# Patient Record
Sex: MALE | Race: WHITE | Employment: OTHER | URBAN - METROPOLITAN AREA
[De-identification: names, ages, dates, MRNs, and addresses within clinical notes are randomized per-mention and may not be internally consistent; named-entity substitution may affect disease eponyms.]

---

## 2017-01-04 ENCOUNTER — NURSING HOME DICTATION (OUTPATIENT)
Dept: CARDIOLOGY | Facility: CLINIC | Age: 82
End: 2017-01-04

## 2017-01-09 NOTE — PROGRESS NOTES
SUBJECTIVE:  Staff reports patient was transferred from a different end of the facility because of a possible situation with inappropriate interaction with another patient.  He seems to be fitting in better here now.  Staff reports no new issues.      PHYSICAL EXAMINATION:   VITAL SIGNS:  See vitals.   CARDIAC:  Regular.   LUNGS:  Clear.      ASSESSMENT:  Stable.        PLAN:  Pharmacy requests a dose reduction on omeprazole, which I feel is appropriate.  Otherwise, he has hypertension, dementia with behavioral disturbance, and history of pacemaker, all stable.  Chart and meds reviewed.  Continue with present plans except the change in the omeprazole.         IRDGE QUINN             D: 2017 09:26   T: 2017 09:51   MT: HARIKA      Name:     ESA LEON   MRN:      [id number]        Account:      [id number]   :      1930           Visit Date:   2017      Document: [id number]       cc: [facility]

## 2017-01-13 DIAGNOSIS — S32.020S COMPRESSION FRACTURE OF L2, SEQUELA: Primary | ICD-10-CM

## 2017-01-13 RX ORDER — HYDROCODONE BITARTRATE AND ACETAMINOPHEN 5; 325 MG/1; MG/1
1 TABLET ORAL EVERY 6 HOURS PRN
Qty: 30 TABLET | Refills: 0 | Status: SHIPPED | OUTPATIENT
Start: 2017-01-13 | End: 2017-02-02

## 2017-01-13 NOTE — TELEPHONE ENCOUNTER
norco      Last Written Prescription Date: 12/29/16  Last Fill Quantity: 30,  # refills: 0   Last Office Visit with [facility], [facility] or [facility] prescribing provider: 1/4/17                                         Next 5 appointments (look out 90 days)     Mar 14, 2017 10:30 AM   (Arrive by 10:15 AM)   Return Visit with  PACEMAKER   [facility] ([facility])    [facility] [place] [address]   [phone]

## 2017-01-27 ENCOUNTER — OFFICE VISIT (OUTPATIENT)
Dept: PSYCHIATRY | Facility: OTHER | Age: 82
End: 2017-01-27
Attending: PSYCHIATRY & NEUROLOGY
Payer: COMMERCIAL

## 2017-01-27 VITALS — HEART RATE: 67 BPM | TEMPERATURE: 97.2 F | SYSTOLIC BLOOD PRESSURE: 106 MMHG | DIASTOLIC BLOOD PRESSURE: 54 MMHG

## 2017-01-27 DIAGNOSIS — F03.91 MAJOR NEUROCOGNITIVE DISORDER DUE TO ALZHEIMER'S DISEASE, PROBABLE, WITH BEHAVIORAL DISTURBANCE: Primary | ICD-10-CM

## 2017-01-27 PROCEDURE — 99204 OFFICE O/P NEW MOD 45 MIN: CPT | Performed by: PSYCHIATRY & NEUROLOGY

## 2017-01-27 PROCEDURE — 99213 OFFICE O/P EST LOW 20 MIN: CPT

## 2017-01-27 ASSESSMENT — ANXIETY QUESTIONNAIRES
7. FEELING AFRAID AS IF SOMETHING AWFUL MIGHT HAPPEN: NOT AT ALL
3. WORRYING TOO MUCH ABOUT DIFFERENT THINGS: NOT AT ALL
6. BECOMING EASILY ANNOYED OR IRRITABLE: NOT AT ALL
2. NOT BEING ABLE TO STOP OR CONTROL WORRYING: NOT AT ALL
5. BEING SO RESTLESS THAT IT IS HARD TO SIT STILL: NOT AT ALL
GAD7 TOTAL SCORE: 0
1. FEELING NERVOUS, ANXIOUS, OR ON EDGE: NOT AT ALL

## 2017-01-27 ASSESSMENT — PATIENT HEALTH QUESTIONNAIRE - PHQ9: 5. POOR APPETITE OR OVEREATING: NOT AT ALL

## 2017-01-27 ASSESSMENT — PAIN SCALES - GENERAL: PAINLEVEL: SEVERE PAIN (6)

## 2017-01-27 NOTE — PROGRESS NOTES
"  OUTPATIENT PSYCHIATRY DIAGNOSTIC ASSESSMENT     IDENTIFICATION:  Toi Cowart is a 86 year old male   The patient was a poor historian.     CHIEF COMPLAINT   agitation    HISTORY OF PRESENT ILLNESS     Toi Cowart is an 85 yo with dementia who presents by himself today to clinic. He resides at [facility]. Paperwork from [facility] sent with Toi notes \"residents behaviors / wandering / inappropriateness, during conference with wife and daughter. Concern was brought up with resident and family is wondering if a consult is needed to review meds with a psych MD.\" Toi was unable to provide much for history at today's visit thus information was obtained from paperwork sent with him and chart review. Looks like consult was placed this fall '16. Last visit from his primary within ~ past month sounded like Toi has been doing a little better in terms of agitation / disruptive behavior. Looks like Toi is taking Depakote sprinkles 125 mg 3 times per day. Risperdal 0.5 mg every 4 hours prn severe agitation is listed but looks like may have been from when he was inpatient for medical issues.     Venancio today notes he is uncertain why he was sent here. He was able to tell me [place], has a wife, and two children. He worked as an  and was it [employer] for two years. He notes he is content with his current residence however \"there are a couple of guys though who I don't always get along with\". I asked some basic questions to help figure out if he was oriented. Toi was able to tell me the season - winter - and the town and state we are in. He was unable to come up with the year. I asked who our new president is and he noted \"Andre\". I asked him to follow a 3 - step - command (grab paper in his right hand, fold it in half, place it on his lap) and he grabbed the paper then shook his head and noted \"now I don't remember the rest of it. What was I supposed to do?\". I asked him to draw " "a clock and place the hands at \"ten to eleven\". He maegan a [place] and began to begin drawing in the numbers but was unable to figure out what numbers and where to place them. He notes he sleeps okay. He denies having any issues with pain, denies any stomach issues such as nausea, vomiting, abdominal pain. Denies any issues with bowel or bladder.       CHEMICAL DEPENDENCY      Unable to get history as to whether any hx of chem dep      MEDICAL/SURGICAL HISTORY            Medical Team:    PMD- Dr. Julio Bell     Therapist- none    Patient Active Problem List   Diagnosis     Sinoatrial node dysfunction (H)     Cardiac pacemaker, Medtronic, Dual chamber     GERD (gastroesophageal reflux disease)     Fatigue     Essential hypertension     Sleep disorder     CAD (coronary artery disease)     Dementia with behavioral disturbance     Hypercholesteremia     Osteoarthritis     Bilateral inguinal hernia     ACP (advance care planning)     Aspiration pneumonia (H)     MEDICAL ROS   A comprehensive 12 point review of systems was performed and found to be negative. In particular Toi denies any issues with pain, stomach pain, nausea or vomiting, headaches.     ALLERGY   Contrast dye; Fluvastatin sodium; Lovastatin; and No clinical screening - see comments    MEDICATIONS                                                                     bold psych meds     Current Outpatient Prescriptions   Medication Sig     HYDROcodone-acetaminophen (NORCO) 5-325 MG per tablet Take 1 tablet by mouth every 6 hours as needed for moderate to severe pain     divalproex (DEPAKOTE SPRINKLE) 125 MG CR capsule TAKE (1) CAPSULE THREE TIMES DAILY.     traZODone (DESYREL) 50 MG tablet TAKE ONE TABLET AT BEDTIME.     sertraline (ZOLOFT) 25 MG tablet TAKE 1 TABLET DAILY.     NUEDEXTA 20-10 MG capsule TAKE 1 CAPSULE DAILY.     omeprazole (PRILOSEC) 20 MG capsule TAKE  (1)  CAPSULE  TWICE DAILY.     amoxicillin-clavulanate (AUGMENTIN) 875-125 MG per tablet " Take 1 tablet by mouth every 12 hours     Rectal Barrier Apply to affected areas as needed daily     loperamide (IMODIUM) 2 MG capsule TAKE 1 CAPSULE FOUR TIMES DAILY FOR DIARRHEA AS NEEDED     nystatin (MYCOSTATIN) 725229 UNIT/GM POWD Apply topically 2 times daily Apply to groin area twice daily.     CALMOSEPTINE 0.44-20.6 % OINT USE AS NEEDED.     GLUCOSAMINE RELIEF 500 MG CAPS TAKE  (1)  CAPSULE  TWICE DAILY.     SB IBUPROFEN 200 MG tablet TAKE 1 TABLET EVERY 4 HOURS AS NEEDED FOR PAIN OR FEVER     memantine XR (NAMENDA XR) 28 MG capsule Take 10 mg by mouth 2 times daily      RisperiDONE (RISPERDAL PO) Take 0.5 mg by mouth every 4 hours as needed      calcium carbonate (TUMS) 500 MG chewable tablet Take 1 chew tab by mouth every 6 hours as needed for heartburn      Pantoprazole Sodium (PROTONIX PO) Take 40 mg by mouth 2 times daily (before meals)      DONEPEZIL HCL PO Take 10 mg by mouth At Bedtime.     No current facility-administered medications for this visit.       VITALS   /54 mmHg  Pulse 67  Temp(Src) 97.2  F (36.2  C) (Tympanic)     PHQ9                             [unfilled]  LABS                                                                                  PSYCHLAB1;  PSYCHLAB2       Last Basic Metabolic Panel:  NA      143   7/26/2016   POTASSIUM      4.7   7/26/2016  CHLORIDE      112   7/26/2016  JOCELIN      8.2   7/26/2016  CO2       20   7/26/2016  BUN       17   7/26/2016  CR     0.69   7/26/2016  GLC       92   7/26/2016    Liver Function Studies -   Recent Labs   Lab Test  07/26/16   0537   PROTTOTAL  6.4*   ALBUMIN  2.5*   BILITOTAL  0.6   ALKPHOS  293*   AST  23   ALT  26      FAMILY HISTORY                                                                           patient reported     Family history is significant for: reports no hx dementia in familyt hat he knows of      SOCIAL HISTORY                                                                            patient reported    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 Naila and Toi  Legal- none  Trauma history (self-report)-  No hx of abuse  Social/Spiritual Support- reports [facility]  MENTAL STATUS EXAM                                                                            Alertness:  alert oriented to person, place, not to date. Later in visit he appeared groggy and looked to be falling asleep.   Appearance:  casually groomed elderly male hunched over in wheelchair.  Behavior/Demeanor:  cooperative and agitated, made comments back mockingly and joked about hitting me though didn't ever act on this with fair  eye contact.  Speech:  normal and regular rate and rhythm  Psychomotor:  normal or unremarkable    Mood: labile  Affect:  labile and was congruent to speech content.  Thought Process/Associations:  unremarkable   Thought Content:  Thought content was unremarkable for suicidal ideation and violent ideation.    Perception:  did not oberve him to be responding to internal stimuli  Insight:  poor.  Judgment: limited.  Attention/Concentration:  poor  Language:  intact and no problems  Fund of Knowledge:  intact   Memory:  Impaired - short term and long-term    These cognitive functions grossly appear as described, but were not formally tested.    ASSESSMENT                                                                                             Toi Cowart is an 85 yo with CAD s/p pacemaker, dementia, HTN, hyperlipidemia who I asked to see for agitation. He is taking Depakote sprinkles 125 mg 3 times daily. Per chart notes looks like he may be ding little better as compared to several months ago. Suggestions if agitation worsens: would check Depakote level , continue to follow LFTs and if level below therapeutic range or lower end could increase Depakote. Could try small dose of hydroxyzine as  a prn for agitation and I'd start as low as can at 1/2 of a 25 mg tab -> could increase as long as it doesn't sedate him. Could think of adding Buspar and would start out low at 5 mg tid. Risperdal 0.5 mg listed but I can't tell if this is current if this is from when he had a hospital stay for medical issues. Neuroleptics do carry the blackbox warning for elderly with dementia however with Mr. Cowart being 87 yo this comes down to quality of life: if agitation is causing possible to harm to others or is very upsetting to Mr. Cowart then in that case one needs to consider that maybe the benefits outweigh the risks of using a neuroleptic - I would stick with the Risperdal and use lowest dose as possible yet effective (0.25 to 0.5 mg per dose). Would discuss these things with his family and explain medications as benefits vs. Risks. Mr. Cowart has hx of falls thus need to be cautious with meds inclduing those I've spoken of: Depakote, Risperdal, hydroxyzine can be sedating.. I don't typically see Buspar to be sedating.       TREATMENT RISK STATEMENT:  The risks, benefits, alternatives and potential adverse effects have been explained and are understood by the pt.  The pt agrees to the treatment plan with the ability to do so.   The pt knows to call the clinic for any problems or access emergency care if needed.        DIAGNOSES                 (Use of Axes system will continue, even though absent from DSM 5)         Axis I   - Major neurocognitive disorder  Axis II  - no dx  Axis III - CAD s/p pacemaker, GERD, HTN  Axis IV-  Psychosocial Stressors include: multiple medical issues, nursing home setting  Axis V - Global Assessment of Functioning current: 45    PLAN                                                                                                                    1)  MEDICATIONS:         -- I did not make any medication changes today for Mr. Cowart, rather made some suggestions as listed above.     2)   THERAPY:  No Change    3)  LABS:  Suggest checking a Depakote level and following LFTs    4)  PT MONITOR [call for probs]:  Worsening symptoms, SI/HI, SEs from meds    5)  REFERRALS [CD, medical, other]:  None    6)  RTC:    No follow up set

## 2017-01-27 NOTE — NURSING NOTE
"Chief Complaint   Patient presents with     Consult     Mental health.  Referral from Dr. KAITY Bell. Medication check / review.       Initial /54 mmHg  Pulse 67  Temp(Src) 97.2  F (36.2  C) (Tympanic) Estimated body mass index is 26.06 kg/(m^2) as calculated from the following:    Height as of 10/30/15: 5' 7\" (1.702 m).    Weight as of 7/13/16: 166 lb 7.2 oz (75.5 kg).  BP completed using cuff size: carolee DIAZ      "

## 2017-01-27 NOTE — MR AVS SNAPSHOT
After Visit Summary   1/27/2017    Toi Cowart    MRN: [id number]           Patient Information     Date Of Birth          8/19/1930        Visit Information        Provider Department      1/27/2017 9:00 AM Lisbet Salas MD [facility]        Today's Diagnoses     Major neurocognitive disorder due to Alzheimer's disease, probable, with behavioral disturbance    -  1        Follow-ups after your visit        Your next 10 appointments already scheduled     Jan 31, 2017  2:30 PM   TTM Pacemaker Remote with  ICD [facility] ([facility])    [address]  3rd Floor  [facility] [id number] [phone]           Note: this is not an onsite visit; there is no need to come to the facility.            Mar 14, 2017 10:30 AM   (Arrive by 10:15 AM)   Return Visit with  PACEMAKER   [facility] [place] ([facility])    [address] [place] [address] [facility] [address] [phone]              Who to contact     If you have questions or need follow up information about today's clinic visit or your schedule please contact [facility] directly at [phone].  Normal or non-critical lab and imaging results will be communicated to you by MyChart, letter or phone within 4 business days after the clinic has received the results. If you do not hear from us within 7 days, please contact the clinic through [employer] or phone. If you have a critical or abnormal lab result, we will notify you by phone as soon as possible.  Submit refill requests through pbsi or call your pharmacy and they will forward the refill request to us. Please allow 3 business days for your refill to be completed.          Additional Information About Your Visit        MyChart Information     pbsi lets you send messages to your doctor, view your test results, renew your prescriptions, schedule appointments and more. To sign up, go to  [email or web address] . Click on \"Log in\" on the left side of the screen, which will take you to the Welcome page. Then click on \"Sign up Now\" on the right side of the page.     You will be asked to enter the access code listed below, as well as some personal information. Please follow the directions to create your username and password.     Your access code is: [email or web address]  Expires: 2017  6:37 PM     Your access code will  in 90 days. If you need help or a new code, please call your [facility] clinic or [phone].        Care EveryWhere ID     This is your Care EveryWhere ID. This could be used by other organizations to access your [facility] medical records  [id number]        Your Vitals Were     Pulse Temperature                67 97.2  F (36.2  C) (Tympanic)           Blood Pressure from Last 3 Encounters:   17 106/54   16 134/65   16 107/73    Weight from Last 3 Encounters:   16 166 lb 7.2 oz (75.5 kg)   14 175 lb (79.379 kg)   05/15/14 200 lb (90.719 kg)              Today, you had the following     No orders found for display       Primary Care Provider Office Phone # Fax #    R Julio Bell [phone]       [facility] [address] [place] [address]        Thank you!     Thank you for choosing [facility]  for your care. Our goal is always to provide you with excellent care. Hearing back from our patients is one way we can continue to improve our services. Please take a few minutes to complete the written survey that you may receive in the mail after your visit with us. Thank you!             Your Updated Medication List - Protect others around you: Learn how to safely use, store and throw away your medicines at www.disposemymeds.org.          This list is accurate as of: 17  6:37 PM.  Always use your most recent med list.                   Brand Name Dispense Instructions for use    calcium carbonate 500 MG " chewable tablet    TUMS     Take 1 chew tab by mouth every 6 hours as needed for heartburn       CALMOSEPTINE 0.44-20.6 % Oint   Generic drug:  Menthol-Zinc Oxide     113 g    USE AS NEEDED.       divalproex 125 MG CR capsule    DEPAKOTE SPRINKLE    84 capsule    TAKE (1) CAPSULE THREE TIMES DAILY.       DONEPEZIL HCL PO      Take 10 mg by mouth At Bedtime.       GLUCOSAMINE RELIEF 500 MG Caps capsule   Generic drug:  glucosamine     60 capsule    TAKE  (1)  CAPSULE  TWICE DAILY.       HYDROcodone-acetaminophen 5-325 MG per tablet    NORCO    30 tablet    Take 1 tablet by mouth every 6 hours as needed for moderate to severe pain       loperamide 2 MG capsule    IMODIUM    120 capsule    TAKE 1 CAPSULE FOUR TIMES DAILY FOR DIARRHEA AS NEEDED       NAMENDA XR 28 MG 24 hr capsule   Generic drug:  memantine XR      Take 10 mg by mouth 2 times daily       NUEDEXTA 20-10 MG per capsule   Generic drug:  dextromethorphan-quiNIDine     8 capsule    TAKE 1 CAPSULE DAILY.       nystatin 799083 UNIT/GM Powd    MYCOSTATIN    60 g    Apply topically 2 times daily Apply to groin area twice daily.       omeprazole 20 MG CR capsule    priLOSEC    60 capsule    TAKE  (1)  CAPSULE  TWICE DAILY.       PROTONIX PO      Take 40 mg by mouth 2 times daily (before meals)       Rectal Barrier     90 g    Apply to affected areas as needed daily       RISPERDAL PO      Take 0.5 mg by mouth every 4 hours as needed       SB IBUPROFEN 200 MG tablet   Generic drug:  ibuprofen     100 tablet    TAKE 1 TABLET EVERY 4 HOURS AS NEEDED FOR PAIN OR FEVER       sertraline 25 MG tablet    ZOLOFT    28 tablet    TAKE 1 TABLET DAILY.       traZODone 50 MG tablet    DESYREL    28 tablet    TAKE ONE TABLET AT BEDTIME.

## 2017-01-28 ASSESSMENT — PATIENT HEALTH QUESTIONNAIRE - PHQ9: SUM OF ALL RESPONSES TO PHQ QUESTIONS 1-9: 0

## 2017-01-28 ASSESSMENT — ANXIETY QUESTIONNAIRES: GAD7 TOTAL SCORE: 0

## 2017-02-01 ENCOUNTER — NURSING HOME DICTATION (OUTPATIENT)
Dept: FAMILY MEDICINE | Facility: OTHER | Age: 82
End: 2017-02-01

## 2017-02-01 ENCOUNTER — OFFICE VISIT (OUTPATIENT)
Dept: CARDIOLOGY | Facility: CLINIC | Age: 82
End: 2017-02-01
Attending: INTERNAL MEDICINE
Payer: COMMERCIAL

## 2017-02-01 DIAGNOSIS — I49.5 SINOATRIAL NODE DYSFUNCTION (H): Primary | ICD-10-CM

## 2017-02-01 PROCEDURE — 93293 PM PHONE R-STRIP DEVICE EVAL: CPT | Performed by: INTERNAL MEDICINE

## 2017-02-01 PROCEDURE — 93293 PM PHONE R-STRIP DEVICE EVAL: CPT | Mod: ZF

## 2017-02-01 NOTE — PROGRESS NOTES
Scheduled transtelephonic pacemaker check done per MD orders.  Normal pacemaker function.  Presenting EGM = AS- @ 60 bpm.  30 second presenting, magnet and post-magnet EGM's saved.  Magnet response within normal limits.  Magnet rate = 85 bpm.  Normal pacemaker function.  Nurse caring for patient reports that he is feeling well and denies specific complaints.  Plan for next RTC pacemaker check on 3/14/2017 @ 10:30 am.    Transtelephonic pacemaker check

## 2017-02-01 NOTE — Clinical Note
2/1/2017      RE: Toi Cowart  [address]   [facility] [address]       Dear Colleague,    Thank you for the opportunity to participate in the care of your patient, Toi Cowart, at the [facility]. Please see a copy of my visit note below.    Scheduled transtelephonic pacemaker check done per MD orders.  Normal pacemaker function.  Presenting EGM = AS- @ 60 bpm.  30 second presenting, magnet and post-magnet EGM's saved.  Magnet response within normal limits.  Magnet rate = 85 bpm.  Normal pacemaker function.  Nurse caring for patient reports that he is feeling well and denies specific complaints.  Plan for next RTC pacemaker check on 3/14/2017 @ 10:30 am.    Transtelephonic pacemaker check    Please do not hesitate to contact me if you have any questions/concerns.     Sincerely,     ICD Remote

## 2017-02-02 DIAGNOSIS — S32.020S COMPRESSION FRACTURE OF L2, SEQUELA: Primary | ICD-10-CM

## 2017-02-02 RX ORDER — HYDROCODONE BITARTRATE AND ACETAMINOPHEN 5; 325 MG/1; MG/1
1 TABLET ORAL EVERY 6 HOURS PRN
Qty: 30 TABLET | Refills: 0 | Status: SHIPPED | OUTPATIENT
Start: 2017-02-02 | End: 2017-02-15

## 2017-02-02 NOTE — TELEPHONE ENCOUNTER
norco      Last Written Prescription Date: 1/13/17  Last Fill Quantity: 30,  # refills: 0   Last Office Visit with [facility], [facility] or [facility] prescribing provider: 1/4/17                                         Next 5 appointments (look out 90 days)     Mar 14, 2017 10:15 AM   Return Visit with [facility] ([facility])    [address] [place] [address]   [phone]

## 2017-02-02 NOTE — PROGRESS NOTES
SUBJECTIVE:  The patient did get an appointment to see Dr. Salas, but unfortunately wife is not present so pertinent history was not obtained.  Staff will communicate with Dr. Salas to fill in details.  Staff reports no new concerns.      PHYSICAL EXAMINATION:   VITAL SIGNS:  Respirations 20, O2 sat is 97, blood pressure 126/64.  Last weight 170.   GENERAL:  On exam, he is sitting in a chair, confused but breathing comfortably, in no distress.   CARDIAC:  Regular.   LUNGS:  Clear.      ASSESSMENT:   1.  Progressive dementia with behavioral disturbance.     2.  History of pacemaker.     3.  Hypertension.        PLAN:  Staff will communicate with Dr. Salsa regarding mental health issues.  Otherwise meds reviewed.  Continue with current plans.         RIDGE QUINN             D: 2017 08:54   T: 2017 09:17   MT: jj      Name:     ESA LEON   MRN:      [id number]        Account:      [id number]   :      1930           Visit Date:   2017      Document: [id number]       [facility]

## 2017-02-03 ENCOUNTER — TELEPHONE (OUTPATIENT)
Dept: PSYCHIATRY | Facility: OTHER | Age: 82
End: 2017-02-03

## 2017-02-03 NOTE — TELEPHONE ENCOUNTER
Spoke with Yogesh, Nurse at [facility].  Yogesh wanted to give Dr. Salas an update on patient because spouse was not able to attend at that appt.  Reason for appt. was about the patient's behaviors and possible medication adjustments / changes if needed.  Patient was engaging with another resident and both were consensual.  No other details were noted.  Patient was moved to another wing of [facility] and Yogesh states that patient is redirectable.  Yogesh does not want any medication changes at this time for patient.  He also does not want pcp managing meds.  Thank you

## 2017-02-05 NOTE — TELEPHONE ENCOUNTER
Oh okay, yeah I didn't get a very clear idea of what was wanted from the consult. i really appreciate Yogesh calling us and giving us this information. This is helpful.

## 2017-02-15 DIAGNOSIS — S32.020S COMPRESSION FRACTURE OF L2, SEQUELA: ICD-10-CM

## 2017-02-15 NOTE — TELEPHONE ENCOUNTER
norco      Last Written Prescription Date: 2/2/17  Last Fill Quantity: 30,  # refills: 0   Last Office Visit with [facility], [facility] or [facility] prescribing provider: 2/1/17                                         Next 5 appointments (look out 90 days)     Mar 14, 2017 10:15 AM CDT   Return Visit with [facility] ([facility])    [address] [place] [address]   [phone]

## 2017-02-16 RX ORDER — HYDROCODONE BITARTRATE AND ACETAMINOPHEN 5; 325 MG/1; MG/1
1 TABLET ORAL EVERY 6 HOURS PRN
Qty: 30 TABLET | Refills: 0 | Status: SHIPPED | OUTPATIENT
Start: 2017-02-16 | End: 2017-02-28

## 2017-02-28 DIAGNOSIS — S32.020S COMPRESSION FRACTURE OF L2, SEQUELA: ICD-10-CM

## 2017-02-28 RX ORDER — HYDROCODONE BITARTRATE AND ACETAMINOPHEN 5; 325 MG/1; MG/1
1 TABLET ORAL EVERY 6 HOURS PRN
Qty: 30 TABLET | Refills: 0 | Status: SHIPPED | OUTPATIENT
Start: 2017-02-28 | End: 2017-03-15

## 2017-03-01 ENCOUNTER — NURSING HOME DICTATION (OUTPATIENT)
Dept: CARDIOLOGY | Facility: CLINIC | Age: 82
End: 2017-03-01

## 2017-03-02 NOTE — PROGRESS NOTES
SUBJECTIVE:  Staff reports no new concerns for Tori Cowart.      PHYSICAL EXAMINATION:   VITAL SIGNS:  Respirations 16, sats 96.  He is afebrile, pulse 82, blood pressure 134/70, weight is 173.   GENERAL:  He is sitting in a wheelchair at the breakfast table, breathing comfortably.   CARDIAC:  Regular.   LUNGS:  Clear.      ASSESSMENT:  Progressive dementia with behavioral disturbance, history of pacemaker and hypertension.      PLAN:  Chart and meds reviewed.  We will continue with current plans.         RIDGE QUINN MD             D: 2017 09:11   T: 2017 09:17   MT: lance      Name:     ESA COWART   MRN:      [id number]        Account:      [id number]   :      1930           Visit Date:   2017      Document: [id number]       [facility]

## 2017-03-09 DIAGNOSIS — F41.8 DEPRESSION WITH ANXIETY: ICD-10-CM

## 2017-03-09 DIAGNOSIS — F51.01 PRIMARY INSOMNIA: ICD-10-CM

## 2017-03-09 DIAGNOSIS — K21.9 GASTROESOPHAGEAL REFLUX DISEASE WITHOUT ESOPHAGITIS: ICD-10-CM

## 2017-03-10 RX ORDER — SERTRALINE HYDROCHLORIDE 25 MG/1
TABLET, FILM COATED ORAL
Qty: 28 TABLET | Refills: 4 | Status: SHIPPED | OUTPATIENT
Start: 2017-03-10 | End: 2018-04-13

## 2017-03-10 RX ORDER — TRAZODONE HYDROCHLORIDE 50 MG/1
TABLET, FILM COATED ORAL
Qty: 28 TABLET | Refills: 4 | Status: SHIPPED | OUTPATIENT
Start: 2017-03-10 | End: 2018-04-18

## 2017-03-14 ENCOUNTER — OFFICE VISIT (OUTPATIENT)
Dept: CARDIOLOGY | Facility: OTHER | Age: 82
End: 2017-03-14
Attending: FAMILY MEDICINE
Payer: COMMERCIAL

## 2017-03-14 DIAGNOSIS — I49.5 SINOATRIAL NODE DYSFUNCTION (H): Primary | ICD-10-CM

## 2017-03-14 PROCEDURE — 93280 PM DEVICE PROGR EVAL DUAL: CPT | Mod: TC

## 2017-03-14 NOTE — PATIENT INSTRUCTIONS
It was a pleasure to see you in clinic today.  Please do not hesitate to call with any questions or concerns.  You are scheduled for a transtelephonic pacemaker check every 5 weeks.  We look forward to seeing you in clinic at your next device check in 6 months.    Maryse Carrion, RN, MS, CCRN  Electrophysiology Nurse Clinician  [facility]    During Business Hours Please Call:  [phone]  After Hours Please Call:  [phone] - select option #4 and ask for job code [phone]

## 2017-03-14 NOTE — MR AVS SNAPSHOT
"              After Visit Summary   3/14/2017    Toi Cowart    MRN: [id number]           Patient Information     Date Of Birth          1930        Visit Information        Provider Department      3/14/2017 10:15 AM  PACEMAKER [facility]         Follow-ups after your visit        Your next 10 appointments already scheduled     Sep 12, 2017 10:30 AM CDT   (Arrive by 10:15 AM)   Return Visit with  PACEMAKER   [facility] ([facility])    [place] [address]   [phone]              Who to contact     If you have questions or need follow up information about today's clinic visit or your schedule please contact [facility] directly at [phone].  Normal or non-critical lab and imaging results will be communicated to you by [email or web address], letter or phone within 4 business days after the clinic has received the results. If you do not hear from us within 7 days, please contact the clinic through [email or web address] or phone. If you have a critical or abnormal lab result, we will notify you by phone as soon as possible.  Submit refill requests through [employer]Faber or call your pharmacy and they will forward the refill request to us. Please allow 3 business days for your refill to be completed.          Additional Information About Your Visit        MyChart Information     uFaber lets you send messages to your doctor, view your test results, renew your prescriptions, schedule appointments and more. To sign up, go to [email or web address] . Click on \"Log in\" on the left side of the screen, which will take you to the Welcome page. Then click on \"Sign up Now\" on the right side of the page.     You will be asked to enter the access code listed below, as well as some personal information. Please follow the directions to create your username and password.     Your access code is: [email or web address]  Expires: 2017  7:37 PM     Your access code will  in 90 days. If " you need help or a new code, please call your [facility] clinic or [phone].        Care EveryWhere ID     This is your Care EveryWhere ID. This could be used by other organizations to access your [facility] medical records  [id number]         Blood Pressure from Last 3 Encounters:   01/27/17 106/54   07/26/16 134/65   07/13/16 107/73    Weight from Last 3 Encounters:   07/27/16 166 lb 7.2 oz (75.5 kg)   09/18/14 175 lb (79.4 kg)   05/15/14 200 lb (90.7 kg)              Today, you had the following     No orders found for display       Primary Care Provider Office Phone # Fax #    R Julio Bell [phone]       [facility] [place] [address] [place], [place] [address] [facility]        Thank you!     Thank you for choosing [facility] [place]  for your care. Our goal is always to provide you with excellent care. Hearing back from our patients is one way we can continue to improve our services. Please take a few minutes to complete the written survey that you may receive in the mail after your visit with us. Thank you!             Your Updated Medication List - Protect others around you: Learn how to safely use, store and throw away your medicines at www.disposemymeds.org.          This list is accurate as of: 3/14/17 10:42 AM.  Always use your most recent med list.                   Brand Name Dispense Instructions for use    calcium carbonate 500 MG chewable tablet    TUMS     Take 1 chew tab by mouth every 6 hours as needed for heartburn       CALMOSEPTINE 0.44-20.6 % Oint   Generic drug:  Menthol-Zinc Oxide     113 g    USE AS NEEDED.       divalproex 125 MG CR capsule    DEPAKOTE SPRINKLE    84 capsule    TAKE (1) CAPSULE THREE TIMES DAILY.       DONEPEZIL HCL PO      Take 10 mg by mouth At Bedtime.       GLUCOSAMINE RELIEF 500 MG Caps capsule   Generic drug:  glucosamine     60 capsule    TAKE  (1)  CAPSULE  TWICE DAILY.       HYDROcodone-acetaminophen 5-325 MG per tablet    NORCO    30  tablet    Take 1 tablet by mouth every 6 hours as needed for moderate to severe pain       loperamide 2 MG capsule    IMODIUM    120 capsule    TAKE 1 CAPSULE FOUR TIMES DAILY FOR DIARRHEA AS NEEDED       NAMENDA XR 28 MG 24 hr capsule   Generic drug:  memantine XR      Take 10 mg by mouth 2 times daily       NUEDEXTA 20-10 MG per capsule   Generic drug:  dextromethorphan-quiNIDine     8 capsule    TAKE 1 CAPSULE DAILY.       nystatin 092695 UNIT/GM Powd    MYCOSTATIN    60 g    Apply topically 2 times daily Apply to groin area twice daily.       omeprazole 20 MG CR capsule    priLOSEC    28 capsule    TAKE 1 CAPSULE DAILY ON EMPTY STOMACH.       PROTONIX PO      Take 40 mg by mouth 2 times daily (before meals)       Rectal Barrier     90 g    Apply to affected areas as needed daily       RISPERDAL PO      Take 0.5 mg by mouth every 4 hours as needed       SB IBUPROFEN 200 MG tablet   Generic drug:  ibuprofen     100 tablet    TAKE 1 TABLET EVERY 4 HOURS AS NEEDED FOR PAIN OR FEVER       sertraline 25 MG tablet    ZOLOFT    28 tablet    TAKE 1 TABLET DAILY.       traZODone 50 MG tablet    DESYREL    28 tablet    TAKE ONE TABLET AT BEDTIME.

## 2017-03-14 NOTE — PROGRESS NOTES
Patient seen at the [facility] for evaluation and iterative programming of his Medtronic dual lead pacemaker per MD orders.  Normal pacemaker function.  9 AHR episodes recorded - 3 sec - 1 hr 4 min in duration.  AF burden = 0%.  1 VHR episode recorded - 2 sec, 219 bpm.  Intrinsic rhythm = SB with  @ 30 bpm.  AP = 6.7%.   = 100%.  Estimated battery longevity to YON = 16 months (minimum <1 month - maximum 29  Months).  Patient reports that he is feeling well and denies specific complaints.  Plan for transtelephonic pacemaker checks every 5 weeks to monitor battery status and return to clinic in 6 months.    Dual lead pacemaker

## 2017-03-15 DIAGNOSIS — S32.020S COMPRESSION FRACTURE OF L2, SEQUELA: ICD-10-CM

## 2017-03-15 RX ORDER — HYDROCODONE BITARTRATE AND ACETAMINOPHEN 5; 325 MG/1; MG/1
1 TABLET ORAL EVERY 6 HOURS PRN
Qty: 30 TABLET | Refills: 0 | Status: SHIPPED | OUTPATIENT
Start: 2017-03-15 | End: 2017-03-28

## 2017-03-15 NOTE — TELEPHONE ENCOUNTER
norco      Last Written Prescription Date: 2/28/17  Last Fill Quantity: 30,  # refills: 0   Last Office Visit with G, P or [facility] prescribing provider: 3/1/17

## 2017-03-28 DIAGNOSIS — S32.020S COMPRESSION FRACTURE OF L2, SEQUELA: ICD-10-CM

## 2017-03-28 RX ORDER — HYDROCODONE BITARTRATE AND ACETAMINOPHEN 5; 325 MG/1; MG/1
1 TABLET ORAL EVERY 6 HOURS PRN
Qty: 30 TABLET | Refills: 0 | Status: SHIPPED | OUTPATIENT
Start: 2017-03-28 | End: 2017-04-17

## 2017-03-28 NOTE — TELEPHONE ENCOUNTER
norco      Last Written Prescription Date: 3/15/17  Last Fill Quantity: 30,  # refills: 0   Last Office Visit with G, P or [facility] prescribing provider: 3/1/17

## 2017-04-04 DIAGNOSIS — F02.80 DEMENTIA DUE TO MEDICAL CONDITION WITHOUT BEHAVIORAL DISTURBANCE (H): ICD-10-CM

## 2017-04-05 ENCOUNTER — NURSING HOME DICTATION (OUTPATIENT)
Dept: CARDIOLOGY | Facility: CLINIC | Age: 82
End: 2017-04-05

## 2017-04-05 NOTE — TELEPHONE ENCOUNTER
DIVALPROEX SOD 125SPRINKLE (Depakote)   Last Written Prescription Date: 3/9/2017  Last Fill Quantity: 84, # refills: 0  Last Office Visit with [facility], P or [facility] prescribing provider: 01/27/2017       Lab Results   Component Value Date    ALT 26 07/26/2016     Lab Results   Component Value Date    WBC 7.1 07/27/2016     Lab Results   Component Value Date    RBC 4.15 07/27/2016     Lab Results   Component Value Date    HGB 12.6 07/27/2016     Lab Results   Component Value Date    HCT 41.7 07/27/2016     No components found for: MCT  Lab Results   Component Value Date     07/27/2016     Lab Results   Component Value Date    MCH 30.4 07/27/2016     Lab Results   Component Value Date    MCHC 30.2 07/27/2016     Lab Results   Component Value Date    RDW 14.7 07/27/2016     Lab Results   Component Value Date     07/27/2016     No results found for: TSH  Creatinine   Date Value Ref Range Status   07/26/2016 0.69 0.66 - 1.25 mg/dL Final       Drug Levels  Depakote: No results found for this or any previous visit.  Dilantin: No results found for this or any previous visit.  Gabitril: No results found for this or any previous visit.  Tegretol: No results found for this or any previous visit.  Zonegran: No results found for this or any previous visit.

## 2017-04-06 RX ORDER — DIVALPROEX SODIUM 125 MG/1
CAPSULE, COATED PELLETS ORAL
Qty: 84 CAPSULE | Refills: 0 | Status: SHIPPED | OUTPATIENT
Start: 2017-04-06 | End: 2017-05-05

## 2017-04-06 NOTE — PROGRESS NOTES
SUBJECTIVE:  Staff reports no acute concerns for Toi Cowart.  Last week had GI symptoms but that has improved.      OBJECTIVE:   VITAL SIGNS:  Respirations 22, sat 95, temperature afebrile, pulse 80, blood pressure 130/72.   CARDIAC:  Regular.   LUNGS:  Clear.   ABDOMEN:  Soft.  No peritoneal signs.      ASSESSMENT:  Progressive dementia, history of behavioral disturbance, history of pacemaker, hypertension, recent gastroenteritis.  He also had a history of septic shock last summer with acute cholecystitis.      PLAN:  Chart and meds reviewed.  We will continue with present plans.         RIDGE QUINN MD             D: 2017 09:07   T: 2017 09:19   MT: lance      Name:     TOI COWART   MRN:      [id number]        Account:      [id number]   :      1930           Visit Date:   2017      Document: [id number]       cc: [facility]

## 2017-04-17 DIAGNOSIS — S32.020S COMPRESSION FRACTURE OF L2, SEQUELA: ICD-10-CM

## 2017-04-17 RX ORDER — HYDROCODONE BITARTRATE AND ACETAMINOPHEN 5; 325 MG/1; MG/1
1 TABLET ORAL EVERY 6 HOURS PRN
Qty: 30 TABLET | Refills: 0 | Status: SHIPPED | OUTPATIENT
Start: 2017-04-17 | End: 2017-04-28

## 2017-04-21 ENCOUNTER — RESULTS ONLY (OUTPATIENT)
Dept: LAB | Age: 82
End: 2017-04-21

## 2017-04-21 ENCOUNTER — MEDICAL CORRESPONDENCE (OUTPATIENT)
Dept: HEALTH INFORMATION MANAGEMENT | Facility: HOSPITAL | Age: 82
End: 2017-04-21

## 2017-04-21 ENCOUNTER — APPOINTMENT (OUTPATIENT)
Dept: LAB | Facility: HOSPITAL | Age: 82
End: 2017-04-21
Attending: FAMILY MEDICINE
Payer: COMMERCIAL

## 2017-04-21 LAB
ALBUMIN SERPL-MCNC: 3.8 G/DL (ref 3.4–5)
ALP SERPL-CCNC: 405 U/L (ref 40–150)
ALT SERPL W P-5'-P-CCNC: 403 U/L (ref 0–70)
AST SERPL W P-5'-P-CCNC: 143 U/L (ref 0–45)
BASOPHILS # BLD AUTO: 0 10E9/L (ref 0–0.2)
BASOPHILS NFR BLD AUTO: 0.5 %
BILIRUB DIRECT SERPL-MCNC: 1 MG/DL (ref 0–0.2)
BILIRUB SERPL-MCNC: 2.5 MG/DL (ref 0.2–1.3)
DIFFERENTIAL METHOD BLD: ABNORMAL
EOSINOPHIL # BLD AUTO: 0.5 10E9/L (ref 0–0.7)
EOSINOPHIL NFR BLD AUTO: 7.7 %
ERYTHROCYTE [DISTWIDTH] IN BLOOD BY AUTOMATED COUNT: 15.7 % (ref 10–15)
HCT VFR BLD AUTO: 48.4 % (ref 40–53)
HGB BLD-MCNC: 16.1 G/DL (ref 13.3–17.7)
IMM GRANULOCYTES # BLD: 0 10E9/L (ref 0–0.4)
IMM GRANULOCYTES NFR BLD: 0.3 %
LYMPHOCYTES # BLD AUTO: 1.4 10E9/L (ref 0.8–5.3)
LYMPHOCYTES NFR BLD AUTO: 23.5 %
MCH RBC QN AUTO: 30.3 PG (ref 26.5–33)
MCHC RBC AUTO-ENTMCNC: 33.3 G/DL (ref 31.5–36.5)
MCV RBC AUTO: 91 FL (ref 78–100)
MONOCYTES # BLD AUTO: 0.3 10E9/L (ref 0–1.3)
MONOCYTES NFR BLD AUTO: 4.6 %
NEUTROPHILS # BLD AUTO: 3.7 10E9/L (ref 1.6–8.3)
NEUTROPHILS NFR BLD AUTO: 63.4 %
NRBC # BLD AUTO: 0 10*3/UL
NRBC BLD AUTO-RTO: 0 /100
PLATELET # BLD AUTO: 119 10E9/L (ref 150–450)
PROT SERPL-MCNC: 7.7 G/DL (ref 6.8–8.8)
RBC # BLD AUTO: 5.32 10E12/L (ref 4.4–5.9)
WBC # BLD AUTO: 5.9 10E9/L (ref 4–11)

## 2017-04-21 PROCEDURE — 80076 HEPATIC FUNCTION PANEL: CPT | Performed by: FAMILY MEDICINE

## 2017-04-21 PROCEDURE — 85025 COMPLETE CBC W/AUTO DIFF WBC: CPT | Performed by: FAMILY MEDICINE

## 2017-04-28 DIAGNOSIS — S32.020S COMPRESSION FRACTURE OF L2, SEQUELA: ICD-10-CM

## 2017-04-28 RX ORDER — HYDROCODONE BITARTRATE AND ACETAMINOPHEN 5; 325 MG/1; MG/1
1 TABLET ORAL EVERY 6 HOURS PRN
Qty: 30 TABLET | Refills: 0 | Status: SHIPPED | OUTPATIENT
Start: 2017-04-28 | End: 2017-05-16

## 2017-04-28 NOTE — TELEPHONE ENCOUNTER
norco      Last Written Prescription Date: 4/17/17  Last Fill Quantity: 30,  # refills: 0   Last Office Visit with G, P or [facility] prescribing provider: 4/5/17

## 2017-05-03 ENCOUNTER — NURSING HOME DICTATION (OUTPATIENT)
Dept: FAMILY MEDICINE | Facility: OTHER | Age: 82
End: 2017-05-03

## 2017-05-04 ENCOUNTER — RESULTS ONLY (OUTPATIENT)
Dept: LAB | Age: 82
End: 2017-05-04

## 2017-05-04 LAB
ERYTHROCYTE [DISTWIDTH] IN BLOOD BY AUTOMATED COUNT: 13.7 % (ref 10–15)
HCT VFR BLD AUTO: 40.4 % (ref 40–53)
HGB BLD-MCNC: 13.9 G/DL (ref 13.3–17.7)
MCH RBC QN AUTO: 30.4 PG (ref 26.5–33)
MCHC RBC AUTO-ENTMCNC: 34.4 G/DL (ref 31.5–36.5)
MCV RBC AUTO: 88 FL (ref 78–100)
PLATELET # BLD AUTO: 108 10E9/L (ref 150–450)
RBC # BLD AUTO: 4.57 10E12/L (ref 4.4–5.9)
WBC # BLD AUTO: 5 10E9/L (ref 4–11)

## 2017-05-04 PROCEDURE — 85027 COMPLETE CBC AUTOMATED: CPT | Performed by: FAMILY MEDICINE

## 2017-05-04 NOTE — PROGRESS NOTES
SUBJECTIVE:  Staff reports no concerns.      OBJECTIVE:     GENERAL:  On exam, Toi Cowart is lying in the bed.     VITAL SIGNS:  His respirations are 14, sats 97.  He is afebrile.  Pulse 64.  Blood pressure 122/78.  Last weight 168.   CARDIAC:  Regular.   LUNGS:  Clear.   ABDOMEN:  Soft, nontender, no HSM.  He has positive bowel sounds, no masses, no guarding.      ASSESSMENT:   1.  History of progressive dementia with behavioral disturbance.   2.  Hypertension.   3.  History of pacemaker.   4.  Remote history of septic shock secondary to acute cholecystitis.      PLAN:  Overall stable.      Chart and meds reviewed.  Will continue with present plans.         RIDGE QUINN MD             D: 2017 08:36   T: 2017 04:44   MT: jj      Name:     TOI COWART   MRN:      [id number]        Account:      [id number]   :      1930           Visit Date:   2017      Document: [id number]       cc: [facility]

## 2017-05-05 DIAGNOSIS — F02.80 DEMENTIA DUE TO MEDICAL CONDITION WITHOUT BEHAVIORAL DISTURBANCE (H): ICD-10-CM

## 2017-05-08 RX ORDER — DIVALPROEX SODIUM 125 MG/1
CAPSULE, COATED PELLETS ORAL
Qty: 84 CAPSULE | Refills: 0 | Status: SHIPPED | OUTPATIENT
Start: 2017-05-08 | End: 2017-06-02

## 2017-05-16 DIAGNOSIS — S32.020S COMPRESSION FRACTURE OF L2, SEQUELA: ICD-10-CM

## 2017-05-16 RX ORDER — HYDROCODONE BITARTRATE AND ACETAMINOPHEN 5; 325 MG/1; MG/1
1 TABLET ORAL EVERY 6 HOURS PRN
Qty: 30 TABLET | Refills: 0 | Status: SHIPPED | OUTPATIENT
Start: 2017-05-16 | End: 2017-05-31

## 2017-05-16 NOTE — TELEPHONE ENCOUNTER
lortab      Last Written Prescription Date: 4/28/17  Last Fill Quantity: 30,  # refills: 0   Last Office Visit with G, P or [facility] prescribing provider: 5/3/17

## 2017-05-24 ENCOUNTER — OFFICE VISIT (OUTPATIENT)
Dept: CARDIOLOGY | Facility: CLINIC | Age: 82
End: 2017-05-24
Attending: INTERNAL MEDICINE
Payer: COMMERCIAL

## 2017-05-24 DIAGNOSIS — I49.5 SINOATRIAL NODE DYSFUNCTION (H): Primary | ICD-10-CM

## 2017-05-24 PROCEDURE — 93293 PM PHONE R-STRIP DEVICE EVAL: CPT | Mod: 26 | Performed by: INTERNAL MEDICINE

## 2017-05-24 PROCEDURE — 93293 PM PHONE R-STRIP DEVICE EVAL: CPT | Mod: ZF

## 2017-05-24 NOTE — LETTER
5/24/2017      RE: Toi Cowart  [address]   [place] [address]       Dear Colleague,    Thank you for the opportunity to participate in the care of your patient, Toi Cowart, at the [facility]. Please see a copy of my visit note below.    Scheduled transtelephonic pacemaker check done per MD orders.  Normal pacemaker function.  Presenting EGM = AS- @ 60 bpm.  30 second presenting, magnet and post-magnet EGM's saved.  Magnet response has reached RRT.  Magnet rate = 64.59 bpm.  Normal pacemaker function.  Nurse caring for patient reports that he is feeling well and denies specific complaints. Connie R. Device RN notified and will schedule pacemaker check by [employer] and make arrangements for generator change.    Transtelephonic pacemaker check    Please do not hesitate to contact me if you have any questions/concerns.     Sincerely,     ICD Remote

## 2017-05-24 NOTE — MR AVS SNAPSHOT
"              After Visit Summary   5/24/2017    Toi Cowart    MRN: [id number]           Patient Information     Date Of Birth          8/19/1930        Visit Information        Provider Department      5/24/2017 6:00 AM  ICD REMOTE [facility]        Today's Diagnoses     Sinoatrial node dysfunction (H)    -  1       Follow-ups after your visit        Your next 10 appointments already scheduled     Sep 12, 2017 10:30 AM CDT   (Arrive by 10:15 AM)   Return Visit with  PACEMAKER   [facility] ([facility])    [address]  [place] [address]   [phone]              Who to contact     If you have questions or need follow up information about today's clinic visit or your schedule please contact [facility] directly at [phone].  Normal or non-critical lab and imaging results will be communicated to you by MyChart, letter or phone within 4 business days after the clinic has received the results. If you do not hear from us within 7 days, please contact the clinic through MyChart or phone. If you have a critical or abnormal lab result, we will notify you by phone as soon as possible.  Submit refill requests through [employer] or call your pharmacy and they will forward the refill request to us. Please allow 3 business days for your refill to be completed.          Additional Information About Your Visit        MyChart Information     [employer] lets you send messages to your doctor, view your test results, renew your prescriptions, schedule appointments and more. To sign up, go to [email or web address] . Click on \"Log in\" on the left side of the screen, which will take you to the Welcome page. Then click on \"Sign up Now\" on the right side of the page.     You will be asked to enter the access code listed below, as well as some personal information. Please follow the directions to create your username and password.     Your access code is: [email or web address]  Expires: 8/23/2017 "  1:25 PM     Your access code will  in 90 days. If you need help or a new code, please call your [facility] clinic or [phone].        Care EveryWhere ID     This is your Care EveryWhere ID. This could be used by other organizations to access your [facility] medical records  [id number]         Blood Pressure from Last 3 Encounters:   17 106/54   16 134/65   16 107/73    Weight from Last 3 Encounters:   16 75.5 kg (166 lb 7.2 oz)   14 79.4 kg (175 lb)   05/15/14 90.7 kg (200 lb)              We Performed the Following     PM PHONE R STRIP EVAL UP TO 90 DAYS        Primary Care Provider Office Phone # Fax #    R Julio Bell [phone]       [facility] [place] [address] [place] [address]        Thank you!     Thank you for choosing [facility]  for your care. Our goal is always to provide you with excellent care. Hearing back from our patients is one way we can continue to improve our services. Please take a few minutes to complete the written survey that you may receive in the mail after your visit with us. Thank you!             Your Updated Medication List - Protect others around you: Learn how to safely use, store and throw away your medicines at www.disposemymeds.org.          This list is accurate as of: 17 11:59 PM.  Always use your most recent med list.                   Brand Name Dispense Instructions for use    calcium carbonate 500 MG chewable tablet    TUMS     Take 1 chew tab by mouth every 6 hours as needed for heartburn       CALMOSEPTINE 0.44-20.6 % Oint   Generic drug:  Menthol-Zinc Oxide     113 g    USE AS NEEDED.       divalproex 125 MG CR capsule    DEPAKOTE SPRINKLE    84 capsule    TAKE (1) CAPSULE THREE TIMES DAILY.       DONEPEZIL HCL PO      Take 10 mg by mouth At Bedtime.       GLUCOSAMINE RELIEF 500 MG Caps capsule   Generic drug:  glucosamine     60 capsule    TAKE  (1)  CAPSULE  TWICE DAILY.        HYDROcodone-acetaminophen 5-325 MG per tablet    NORCO    30 tablet    Take 1 tablet by mouth every 6 hours as needed for moderate to severe pain       loperamide 2 MG capsule    IMODIUM    120 capsule    TAKE 1 CAPSULE FOUR TIMES DAILY FOR DIARRHEA AS NEEDED       NAMENDA XR 28 MG 24 hr capsule   Generic drug:  memantine XR      Take 10 mg by mouth 2 times daily       NUEDEXTA 20-10 MG per capsule   Generic drug:  dextromethorphan-quiNIDine     8 capsule    TAKE 1 CAPSULE DAILY.       nystatin 281070 UNIT/GM Powd    MYCOSTATIN    60 g    Apply topically 2 times daily Apply to groin area twice daily.       omeprazole 20 MG CR capsule    priLOSEC    28 capsule    TAKE 1 CAPSULE DAILY ON EMPTY STOMACH.       PROTONIX PO      Take 40 mg by mouth 2 times daily (before meals)       Rectal Barrier     90 g    Apply to affected areas as needed daily       RISPERDAL PO      Take 0.5 mg by mouth every 4 hours as needed       SB IBUPROFEN 200 MG tablet   Generic drug:  ibuprofen     100 tablet    TAKE 1 TABLET EVERY 4 HOURS AS NEEDED FOR PAIN OR FEVER       sertraline 25 MG tablet    ZOLOFT    28 tablet    TAKE 1 TABLET DAILY.       traZODone 50 MG tablet    DESYREL    28 tablet    TAKE ONE TABLET AT BEDTIME.

## 2017-05-25 NOTE — PROGRESS NOTES
Scheduled transtelephonic pacemaker check done per MD orders.  Normal pacemaker function.  Presenting EGM = AS- @ 60 bpm.  30 second presenting, magnet and post-magnet EGM's saved.  Magnet response has reached RRT.  Magnet rate = 64.59 bpm.  Normal pacemaker function.  Nurse caring for patient reports that he is feeling well and denies specific complaints. Connie R. Device RN notified and will schedule pacemaker check by [employer] and make arrangements for generator change.    Transtelephonic pacemaker check

## 2017-05-31 DIAGNOSIS — S32.020S COMPRESSION FRACTURE OF L2, SEQUELA: ICD-10-CM

## 2017-05-31 RX ORDER — HYDROCODONE BITARTRATE AND ACETAMINOPHEN 5; 325 MG/1; MG/1
1 TABLET ORAL EVERY 6 HOURS PRN
Qty: 30 TABLET | Refills: 0 | Status: SHIPPED | OUTPATIENT
Start: 2017-05-31 | End: 2017-06-16

## 2017-06-02 DIAGNOSIS — F02.80 DEMENTIA DUE TO MEDICAL CONDITION WITHOUT BEHAVIORAL DISTURBANCE (H): ICD-10-CM

## 2017-06-06 RX ORDER — DIVALPROEX SODIUM 125 MG/1
CAPSULE, COATED PELLETS ORAL
Qty: 84 CAPSULE | Refills: 0 | Status: SHIPPED | OUTPATIENT
Start: 2017-06-06 | End: 2018-06-20

## 2017-06-08 ENCOUNTER — TRANSFERRED RECORDS (OUTPATIENT)
Dept: CARDIOLOGY | Facility: CLINIC | Age: 82
End: 2017-06-08

## 2017-06-14 ENCOUNTER — NURSING HOME DICTATION (OUTPATIENT)
Dept: FAMILY MEDICINE | Facility: OTHER | Age: 82
End: 2017-06-14

## 2017-06-15 NOTE — PROGRESS NOTES
SUBJECTIVE:  Staff notes that daily after a meal, Toi Cowart will have the sudden urge to void, it is a little soft, no watery stool, no blood or mucus.  No fevers.      PHYSICAL EXAMINATION:   VITAL SIGNS:  On exam, respirations 20, pulse 80, temperature 98.4, sat is 96%, blood pressure 128/76.   GENERAL:  He is alert, lying in bed, breathing comfortably, in no distress.   CARDIAC:  Regular.   LUNGS:  Clear.   ABDOMEN:  Soft, nontender.      ASSESSMENT:   1.  Progressive dementia with history of behavioral disturbance, that seems stable.   2.  Hypertension.   3.  History of pacemaker.   4.  History of septic shock secondary to acute cholecystitis.   5.  Pronounced gastrocolic reflex.      PLAN:  Will add a daily teaspoon of Metamucil to see if we can bulk up his stools and help regulate that.  If that seems to make the situation more, staff will call me and will discontinue the order.  Otherwise chart and meds reviewed.  Continue with the current plans.         RIDGE QUINN MD             D: 2017 08:04   T: 06/15/2017 08:26   MT: lance      Name:     TOI COWART   MRN:      [id number]        Account:      [id number]   :      1930           Visit Date:   2017      Document: [id number]       cc: [facility]

## 2017-06-16 DIAGNOSIS — S32.020S COMPRESSION FRACTURE OF L2, SEQUELA: ICD-10-CM

## 2017-06-16 RX ORDER — HYDROCODONE BITARTRATE AND ACETAMINOPHEN 5; 325 MG/1; MG/1
1 TABLET ORAL EVERY 6 HOURS PRN
Qty: 30 TABLET | Refills: 0 | Status: SHIPPED | OUTPATIENT
Start: 2017-06-16 | End: 2017-06-30

## 2017-06-16 NOTE — TELEPHONE ENCOUNTER
norco      Last Written Prescription Date: 5/31/17  Last Fill Quantity: 30,  # refills: 0   Last Office Visit with [facility], [facility] or [facility] prescribing provider: 6/14/17                                         Next 5 appointments (look out 90 days)     Sep 12, 2017 10:30 AM CDT   (Arrive by 10:15 AM)   Return Visit with HC PACEMAKER   [facility] ([facility] )    [address] [place] [address]   [phone]

## 2017-06-28 ENCOUNTER — OFFICE VISIT (OUTPATIENT)
Dept: CARDIOLOGY | Facility: CLINIC | Age: 82
End: 2017-06-28
Attending: INTERNAL MEDICINE
Payer: COMMERCIAL

## 2017-06-28 DIAGNOSIS — I49.5 SINOATRIAL NODE DYSFUNCTION (H): Primary | ICD-10-CM

## 2017-06-28 PROCEDURE — 93293 PM PHONE R-STRIP DEVICE EVAL: CPT | Mod: ZF

## 2017-06-28 PROCEDURE — 99207 HC PM PHONE R STRIP EVAL UP TO 90 DAYS: CPT | Mod: ZP | Performed by: INTERNAL MEDICINE

## 2017-06-28 NOTE — MR AVS SNAPSHOT
"              After Visit Summary   6/28/2017    Toi Cowart    MRN: [id number]           Patient Information     Date Of Birth          8/19/1930        Visit Information        Provider Department      6/28/2017 6:00 AM  ICD REMOTE [facility]        Today's Diagnoses     Sinoatrial node dysfunction (H)    -  1       Follow-ups after your visit        Your next 10 appointments already scheduled     Sep 12, 2017 10:30 AM CDT   (Arrive by 10:15 AM)   Return Visit with HC PACEMAKER   [facility] [place] ([facility] [place] )    [address]  [place] [address]   [phone]              Who to contact     If you have questions or need follow up information about today's clinic visit or your schedule please contact [facility] directly at [phone].  Normal or non-critical lab and imaging results will be communicated to you by MyChart, letter or phone within 4 business days after the clinic has received the results. If you do not hear from us within 7 days, please contact the clinic through MyChart or phone. If you have a critical or abnormal lab result, we will notify you by phone as soon as possible.  Submit refill requests through Startcapps or call your pharmacy and they will forward the refill request to us. Please allow 3 business days for your refill to be completed.          Additional Information About Your Visit        MyChart Information     Startcapps lets you send messages to your doctor, view your test results, renew your prescriptions, schedule appointments and more. To sign up, go to [email or web address] . Click on \"Log in\" on the left side of the screen, which will take you to the Welcome page. Then click on \"Sign up Now\" on the right side of the page.     You will be asked to enter the access code listed below, as well as some personal information. Please follow the directions to create your username and password.     Your access code is: " [email or web address]  Expires: 2017  1:25 PM     Your access code will  in 90 days. If you need help or a new code, please call your [place] clinic or [phone].        Care EveryWhere ID     This is your Care EveryWhere ID. This could be used by other organizations to access your [place] medical records  [id number]         Blood Pressure from Last 3 Encounters:   17 106/54   16 134/65   16 107/73    Weight from Last 3 Encounters:   16 75.5 kg (166 lb 7.2 oz)   14 79.4 kg (175 lb)   05/15/14 90.7 kg (200 lb)              We Performed the Following     PM PHONE R STRIP EVAL UP TO 90 DAYS        Primary Care Provider Office Phone # Fax #    R Julio Bell [phone]       [facility] [place] [address] [place] [address]  [facility] [address]        Equal Access to Services     SINAN RÍOS : Hadii aad ku hadasho Soomaali, waaxda luqadaha, qaybta kaalmada adeegyada, waxay idiin hayaan nicole kangarahugo dawson . So [facility] [phone].    ATENCIÓN: Si habla español, tiene a lagos disposición servicios gratuitos de asistencia lingüística. Llame al [phone].    We comply with applicable federal civil rights laws and [place] laws. We do not discriminate on the basis of race, color, national origin, age, disability sex, sexual orientation or gender identity.            Thank you!     Thank you for choosing [facility]  for your care. Our goal is always to provide you with excellent care. Hearing back from our patients is one way we can continue to improve our services. Please take a few minutes to complete the written survey that you may receive in the mail after your visit with us. Thank you!             Your Updated Medication List - Protect others around you: Learn how to safely use, store and throw away your medicines at www.disposemymeds.org.          This list is accurate as of: 17 11:59 PM.  Always use your most recent med list.                   Brand  Name Dispense Instructions for use Diagnosis    calcium carbonate 500 MG chewable tablet    TUMS     Take 1 chew tab by mouth every 6 hours as needed for heartburn        CALMOSEPTINE 0.44-20.6 % Oint   Generic drug:  Menthol-Zinc Oxide     113 g    USE AS NEEDED.    Skin irritation       divalproex 125 MG CR capsule    DEPAKOTE SPRINKLE    84 capsule    TAKE (1) CAPSULE THREE TIMES DAILY.    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        GLUCOSAMINE RELIEF 500 MG Caps capsule   Generic drug:  glucosamine     60 capsule    TAKE  (1)  CAPSULE  TWICE DAILY.    Compression fracture of L2, sequela       loperamide 2 MG capsule    IMODIUM    120 capsule    TAKE 1 CAPSULE FOUR TIMES DAILY FOR DIARRHEA AS NEEDED    Diarrhea       NAMENDA XR 28 MG 24 hr capsule   Generic drug:  memantine XR      Take 10 mg by mouth 2 times daily        NUEDEXTA 20-10 MG per capsule   Generic drug:  dextromethorphan-quiNIDine     8 capsule    TAKE 1 CAPSULE DAILY.    Mild depressed bipolar I disorder (H)       nystatin 055553 UNIT/GM Powd    MYCOSTATIN    60 g    Apply topically 2 times daily Apply to groin area twice daily.    Yeast infection of the skin       omeprazole 20 MG CR capsule    priLOSEC    28 capsule    TAKE 1 CAPSULE DAILY ON EMPTY STOMACH.    Gastroesophageal reflux disease without esophagitis       PROTONIX PO      Take 40 mg by mouth 2 times daily (before meals)        Rectal Barrier     90 g    Apply to affected areas as needed daily    Generalized muscle weakness       RISPERDAL PO      Take 0.5 mg by mouth every 4 hours as needed        SB IBUPROFEN 200 MG tablet   Generic drug:  ibuprofen     100 tablet    TAKE 1 TABLET EVERY 4 HOURS AS NEEDED FOR PAIN OR FEVER    Pain       sertraline 25 MG tablet    ZOLOFT    28 tablet    TAKE 1 TABLET DAILY.    Depression with anxiety       traZODone 50 MG tablet    DESYREL    28 tablet    TAKE ONE  TABLET AT BEDTIME.    Primary insomnia

## 2017-06-28 NOTE — LETTER
6/28/2017      RE: Toi Cowart  [address]   [facility] [facility] [address]       Dear Colleague,    Thank you for the opportunity to participate in the care of your patient, Toi Cowart, at the [facility] HEART [facility]. Please see a copy of my visit note below.    Scheduled transtelephonic pacemaker check done per MD orders.  Normal pacemaker function.  Presenting EGM = AS- @ 60 bpm.  30 second presenting, magnet and post-magnet EGM's saved.  Magnet response within normal limits.  Magnet rate = 85 bpm.  Normal pacemaker function.  Nurse caring for patient reports that he is feeling well and denies specific complaints.  Plan for next RTC pacemaker check on 8/1/2017 @ 9:00 am.    Transtelephonic pacemaker check    Please do not hesitate to contact me if you have any questions/concerns.     Sincerely,     ICD Remote

## 2017-06-30 DIAGNOSIS — S32.020S COMPRESSION FRACTURE OF L2, SEQUELA: ICD-10-CM

## 2017-06-30 RX ORDER — HYDROCODONE BITARTRATE AND ACETAMINOPHEN 5; 325 MG/1; MG/1
1 TABLET ORAL EVERY 6 HOURS PRN
Qty: 30 TABLET | Refills: 0 | Status: SHIPPED | OUTPATIENT
Start: 2017-06-30 | End: 2017-07-12

## 2017-06-30 NOTE — TELEPHONE ENCOUNTER
Norco      Last Written Prescription Date:  6/16/17  Last Fill Quantity: 30,   # refills: 0  Last Office Visit with [facility], [facility] or [facility] prescribing provider: 6/14/17  Future Office visit:    Next 5 appointments (look out 90 days)     Sep 12, 2017 10:30 AM CDT   (Arrive by 10:15 AM)   Return Visit with  PACEMAKER   [facility] ([facility] [place] )    [address] [place] [address] [place]  [place] [address]   [phone]                   Routing refill request to provider for review/approval because:  Drug not on the [facility], [facility] or [facility] refill protocol or controlled substance    PCP:  Dr. KAITY Bell

## 2017-07-11 NOTE — PROGRESS NOTES
Scheduled transtelephonic pacemaker check done per MD orders.  Normal pacemaker function.  Presenting EGM = AS- @ 60 bpm.  30 second presenting, magnet and post-magnet EGM's saved.  Magnet response within normal limits.  Magnet rate = 85 bpm.  Normal pacemaker function.  Nurse caring for patient reports that he is feeling well and denies specific complaints.  Plan for next RTC pacemaker check on 8/1/2017 @ 9:00 am.    Transtelephonic pacemaker check

## 2017-07-12 DIAGNOSIS — S32.020S COMPRESSION FRACTURE OF L2, SEQUELA: ICD-10-CM

## 2017-07-12 PROCEDURE — 99307 SBSQ NF CARE SF MDM 10: CPT | Performed by: FAMILY MEDICINE

## 2017-07-12 NOTE — TELEPHONE ENCOUNTER
norco      Last Written Prescription Date: 6/30/17  Last Fill Quantity: 30,  # refills: 0   Last Office Visit with [facility], [facility] or [facility] prescribing provider: 6/14/17                                         Next 5 appointments (look out 90 days)     Sep 12, 2017 10:30 AM CDT   (Arrive by 10:15 AM)   Return Visit with HC PACEMAKER   [facility] ([facility] [place] )    [address] [place] [address]   [phone]

## 2017-07-13 RX ORDER — HYDROCODONE BITARTRATE AND ACETAMINOPHEN 5; 325 MG/1; MG/1
1 TABLET ORAL EVERY 6 HOURS PRN
Qty: 30 TABLET | Refills: 0 | Status: SHIPPED | OUTPATIENT
Start: 2017-07-13 | End: 2017-07-27

## 2017-07-17 ENCOUNTER — NURSING HOME VISIT (OUTPATIENT)
Dept: FAMILY MEDICINE | Facility: OTHER | Age: 82
End: 2017-07-17
Payer: COMMERCIAL

## 2017-07-17 DIAGNOSIS — Z78.9 NURSING HOME RESIDENT: Primary | ICD-10-CM

## 2017-07-17 NOTE — MR AVS SNAPSHOT
"              After Visit Summary   7/17/2017    Toi Cowart    MRN: [id number]           Patient Information     Date Of Birth          8/19/1930        Visit Information        Provider Department      7/17/2017 9:39 AM RIDGE Bell MD [facility] [place]        Today's Diagnoses     Nursing home resident    -  1       Follow-ups after your visit        Your next 10 appointments already scheduled     Sep 12, 2017 10:30 AM CDT   (Arrive by 10:15 AM)   Return Visit with HC PACEMAKER   [facility] [place] ([facility] [place] )    [address] [place] [address]   [phone]              Who to contact     If you have questions or need follow up information about today's clinic visit or your schedule please contact [facility] directly at [phone].  Normal or non-critical lab and imaging results will be communicated to you by MyChart, letter or phone within 4 business days after the clinic has received the results. If you do not hear from us within 7 days, please contact the clinic through MyChart or phone. If you have a critical or abnormal lab result, we will notify you by phone as soon as possible.  Submit refill requests through [employer] or call your pharmacy and they will forward the refill request to us. Please allow 3 business days for your refill to be completed.          Additional Information About Your Visit        MyChart Information     [employer] lets you send messages to your doctor, view your test results, renew your prescriptions, schedule appointments and more. To sign up, go to [email or web address] . Click on \"Log in\" on the left side of the screen, which will take you to the Welcome page. Then click on \"Sign up Now\" on the right side of the page.     You will be asked to enter the access code listed below, as well as some personal information. Please follow the directions to create your username and password.     Your access code is: " [email or web address]  Expires: 2017  1:25 PM     Your access code will  in 90 days. If you need help or a new code, please call your [place] clinic or [phone].        Care EveryWhere ID     This is your Care EveryWhere ID. This could be used by other organizations to access your [place] medical records  [id number]         Blood Pressure from Last 3 Encounters:   17 106/54   16 134/65   16 107/73    Weight from Last 3 Encounters:   16 166 lb 7.2 oz (75.5 kg)   14 175 lb (79.4 kg)   05/15/14 200 lb (90.7 kg)              Today, you had the following     No orders found for display       Primary Care Provider Office Phone # Fax #    R Julio Bell [phone]       [facility] [place] [address] [place], [place] [address] [address]        Equal Access to Services     SINAN RÍOS : Hadii aad ku hadasho Soomaali, waaxda luqadaha, qaybta kaalmada adeegyada, waxay idiin hayaan adeeg hebert dawson . So [facility] [phone].    ATENCIÓN: Si habla español, tiene a lagos disposición servicios gratuitos de asistencia lingüística. Llame al [phone].    We comply with applicable federal civil rights laws and [place] laws. We do not discriminate on the basis of race, color, national origin, age, disability sex, sexual orientation or gender identity.            Thank you!     Thank you for choosing [facility] [place]  for your care. Our goal is always to provide you with excellent care. Hearing back from our patients is one way we can continue to improve our services. Please take a few minutes to complete the written survey that you may receive in the mail after your visit with us. Thank you!             Your Updated Medication List - Protect others around you: Learn how to safely use, store and throw away your medicines at www.disposemymeds.org.          This list is accurate as of: 17 11:59 PM.  Always use your most recent med list.                   Brand Name  Dispense Instructions for use Diagnosis    calcium carbonate 500 MG chewable tablet    TUMS     Take 1 chew tab by mouth every 6 hours as needed for heartburn        CALMOSEPTINE 0.44-20.6 % Oint   Generic drug:  Menthol-Zinc Oxide     113 g    USE AS NEEDED.    Skin irritation       divalproex 125 MG CR capsule    DEPAKOTE SPRINKLE    84 capsule    TAKE (1) CAPSULE THREE TIMES DAILY.    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        GLUCOSAMINE RELIEF 500 MG Caps capsule   Generic drug:  glucosamine     60 capsule    TAKE  (1)  CAPSULE  TWICE DAILY.    Compression fracture of L2, sequela       HYDROcodone-acetaminophen 5-325 MG per tablet    NORCO    30 tablet    Take 1 tablet by mouth every 6 hours as needed for moderate to severe pain    Compression fracture of L2, sequela       loperamide 2 MG capsule    IMODIUM    120 capsule    TAKE 1 CAPSULE FOUR TIMES DAILY FOR DIARRHEA AS NEEDED    Diarrhea       NAMENDA XR 28 MG 24 hr capsule   Generic drug:  memantine XR      Take 10 mg by mouth 2 times daily        NUEDEXTA 20-10 MG per capsule   Generic drug:  dextromethorphan-quiNIDine     8 capsule    TAKE 1 CAPSULE DAILY.    Mild depressed bipolar I disorder (H)       nystatin 768960 UNIT/GM Powd    MYCOSTATIN    60 g    Apply topically 2 times daily Apply to groin area twice daily.    Yeast infection of the skin       omeprazole 20 MG CR capsule    priLOSEC    28 capsule    TAKE 1 CAPSULE DAILY ON EMPTY STOMACH.    Gastroesophageal reflux disease without esophagitis       PROTONIX PO      Take 40 mg by mouth 2 times daily (before meals)        Rectal Barrier     90 g    Apply to affected areas as needed daily    Generalized muscle weakness       RISPERDAL PO      Take 0.5 mg by mouth every 4 hours as needed        SB IBUPROFEN 200 MG tablet   Generic drug:  ibuprofen     100 tablet    TAKE 1 TABLET EVERY 4 HOURS AS NEEDED FOR PAIN  OR FEVER    Pain       sertraline 25 MG tablet    ZOLOFT    28 tablet    TAKE 1 TABLET DAILY.    Depression with anxiety       traZODone 50 MG tablet    DESYREL    28 tablet    TAKE ONE TABLET AT BEDTIME.    Primary insomnia

## 2017-07-17 NOTE — PROGRESS NOTES
Staff reports no concerns.    EXAMINATION:    Vital signs:  Respirations 16, temperature 97, sat is 96, pulse 64, blood pressure 110/64.    Cardiac:  Regular.   Lungs:  Clear.    ASSESSMENT:    1.  Progressive dementia.  2.  Hypertension.  3.  History of pacemaker.  4.  History of septic shock secondary to cholecystitis.  5.  History gastrocolic reflex.    PLAN:  Chart medication reviewed.  Will continue with present plans.    Cc:  Guardiedward Bell MD    D:  07/12/2017 09:29 T:  07/15/2017 21:31  Document:  [id number] RR\AD

## 2017-07-27 DIAGNOSIS — S32.020S COMPRESSION FRACTURE OF L2, SEQUELA: ICD-10-CM

## 2017-07-27 RX ORDER — HYDROCODONE BITARTRATE AND ACETAMINOPHEN 5; 325 MG/1; MG/1
1 TABLET ORAL EVERY 6 HOURS PRN
Qty: 30 TABLET | Refills: 0 | Status: SHIPPED | OUTPATIENT
Start: 2017-07-27 | End: 2017-08-16

## 2017-07-27 NOTE — TELEPHONE ENCOUNTER
norco     Last Written Prescription Date: 7/13/17  Last Fill Quantity: 30,  # refills: 0   Last Office Visit with [facility], [facility] or [facility] prescribing provider: 7/17/17                                         Next 5 appointments (look out 90 days)     Sep 12, 2017 10:30 AM CDT   (Arrive by 10:15 AM)   Return Visit with HC PACEMAKER   [facility] [place] ([facility] [place] )    [address] [place] [address]   [phone]

## 2017-08-02 ENCOUNTER — OFFICE VISIT (OUTPATIENT)
Dept: CARDIOLOGY | Facility: CLINIC | Age: 82
End: 2017-08-02
Attending: INTERNAL MEDICINE
Payer: COMMERCIAL

## 2017-08-02 DIAGNOSIS — I49.5 SINOATRIAL NODE DYSFUNCTION (H): Primary | ICD-10-CM

## 2017-08-02 PROCEDURE — 93293 PM PHONE R-STRIP DEVICE EVAL: CPT | Mod: ZF

## 2017-08-02 PROCEDURE — 99207 HC PM PHONE R STRIP EVAL UP TO 90 DAYS: CPT | Mod: ZP | Performed by: INTERNAL MEDICINE

## 2017-08-02 NOTE — PROGRESS NOTES
Scheduled transtelephonic pacemaker check done per MD orders.  Normal pacemaker function.  Presenting EGM = AS- @ 70 bpm.  30 second presenting, magnet and post-magnet EGM's saved.  Magnet response within normal limits.  Magnet rate = 86 bpm.  Normal pacemaker function.  Nurse caring for patient reports that he is feeling well and denies specific complaints.  Plan for next RTC pacemaker check on 9/12/2017 @ 10:30 am.    Transtelephonic pacemaker check

## 2017-08-02 NOTE — LETTER
8/2/2017      RE: Toi Cowart  [address]   [place] [address]       Dear Colleague,    Thank you for the opportunity to participate in the care of your patient, Toi Cowart, at the [facility] HEART [facility]. Please see a copy of my visit note below.    Scheduled transtelephonic pacemaker check done per MD orders.  Normal pacemaker function.  Presenting EGM = AS- @ 70 bpm.  30 second presenting, magnet and post-magnet EGM's saved.  Magnet response within normal limits.  Magnet rate = 86 bpm.  Normal pacemaker function.  Nurse caring for patient reports that he is feeling well and denies specific complaints.  Plan for next RTC pacemaker check on 9/12/2017 @ 10:30 am.    Transtelephonic pacemaker check      Please do not hesitate to contact me if you have any questions/concerns.     Sincerely,     ICD Remote

## 2017-08-02 NOTE — MR AVS SNAPSHOT
"              After Visit Summary   8/2/2017    Toi Cowart    MRN: [id number]           Patient Information     Date Of Birth          8/19/1930        Visit Information        Provider Department      8/2/2017 6:00 AM  ICD REMOTE [facility]        Today's Diagnoses     Sinoatrial node dysfunction (H)    -  1       Follow-ups after your visit        Your next 10 appointments already scheduled     Sep 12, 2017 10:30 AM CDT   (Arrive by 10:15 AM)   Return Visit with HC PACEMAKER   [facility] [place] ([facility] [place] )    [address]  [place] [address]   [phone]              Who to contact     If you have questions or need follow up information about today's clinic visit or your schedule please contact [facility] directly at [phone].  Normal or non-critical lab and imaging results will be communicated to you by MyChart, letter or phone within 4 business days after the clinic has received the results. If you do not hear from us within 7 days, please contact the clinic through MyChart or phone. If you have a critical or abnormal lab result, we will notify you by phone as soon as possible.  Submit refill requests through [employer] or call your pharmacy and they will forward the refill request to us. Please allow 3 business days for your refill to be completed.          Additional Information About Your Visit        MyChart Information     Precognate lets you send messages to your doctor, view your test results, renew your prescriptions, schedule appointments and more. To sign up, go to [email or web address] . Click on \"Log in\" on the left side of the screen, which will take you to the Welcome page. Then click on \"Sign up Now\" on the right side of the page.     You will be asked to enter the access code listed below, as well as some personal information. Please follow the directions to create your username and password.     Your access code is: " [email or web address]  Expires: 2017  1:25 PM     Your access code will  in 90 days. If you need help or a new code, please call your [place] clinic or [phone].        Care EveryWhere ID     This is your Care EveryWhere ID. This could be used by other organizations to access your [place] medical records  [id number]         Blood Pressure from Last 3 Encounters:   17 106/54   16 134/65   16 107/73    Weight from Last 3 Encounters:   16 75.5 kg (166 lb 7.2 oz)   14 79.4 kg (175 lb)   05/15/14 90.7 kg (200 lb)              We Performed the Following     PM PHONE R STRIP EVAL UP TO 90 DAYS        Primary Care Provider Office Phone # Fax #    R Julio Bell [phone]       [facility] [place] [address] [place] [address]  [facility] [address]        Equal Access to Services     SINAN RÍOS : Hadii aad ku hadasho Soomaali, waaxda luqadaha, qaybta kaalmada adeegyada, waxay idiin hayaan nicole kangarahugo dawson . So [facility] [phone].    ATENCIÓN: Si habla español, tiene a lagos disposición servicios gratuitos de asistencia lingüística. Llame al [phone].    We comply with applicable federal civil rights laws and [place] laws. We do not discriminate on the basis of race, color, national origin, age, disability sex, sexual orientation or gender identity.            Thank you!     Thank you for choosing [facility]  for your care. Our goal is always to provide you with excellent care. Hearing back from our patients is one way we can continue to improve our services. Please take a few minutes to complete the written survey that you may receive in the mail after your visit with us. Thank you!             Your Updated Medication List - Protect others around you: Learn how to safely use, store and throw away your medicines at www.disposemymeds.org.          This list is accurate as of: 17  9:35 AM.  Always use your most recent med list.                   Brand Name  Dispense Instructions for use Diagnosis    calcium carbonate 500 MG chewable tablet    TUMS     Take 1 chew tab by mouth every 6 hours as needed for heartburn        CALMOSEPTINE 0.44-20.6 % Oint   Generic drug:  Menthol-Zinc Oxide     113 g    USE AS NEEDED.    Skin irritation       divalproex 125 MG CR capsule    DEPAKOTE SPRINKLE    84 capsule    TAKE (1) CAPSULE THREE TIMES DAILY.    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        GLUCOSAMINE RELIEF 500 MG Caps capsule   Generic drug:  glucosamine     60 capsule    TAKE  (1)  CAPSULE  TWICE DAILY.    Compression fracture of L2, sequela       HYDROcodone-acetaminophen 5-325 MG per tablet    NORCO    30 tablet    Take 1 tablet by mouth every 6 hours as needed for moderate to severe pain    Compression fracture of L2, sequela       loperamide 2 MG capsule    IMODIUM    120 capsule    TAKE 1 CAPSULE FOUR TIMES DAILY FOR DIARRHEA AS NEEDED    Diarrhea       NAMENDA XR 28 MG 24 hr capsule   Generic drug:  memantine XR      Take 10 mg by mouth 2 times daily        NUEDEXTA 20-10 MG per capsule   Generic drug:  dextromethorphan-quiNIDine     8 capsule    TAKE 1 CAPSULE DAILY.    Mild depressed bipolar I disorder (H)       nystatin [id number] UNIT/GM Powd    MYCOSTATIN    60 g    Apply topically 2 times daily Apply to groin area twice daily.    Yeast infection of the skin       omeprazole 20 MG CR capsule    priLOSEC    28 capsule    TAKE 1 CAPSULE DAILY ON EMPTY STOMACH.    Gastroesophageal reflux disease without esophagitis       PROTONIX PO      Take 40 mg by mouth 2 times daily (before meals)        Rectal Barrier     90 g    Apply to affected areas as needed daily    Generalized muscle weakness       RISPERDAL PO      Take 0.5 mg by mouth every 4 hours as needed        SB IBUPROFEN 200 MG tablet   Generic drug:  ibuprofen     100 tablet    TAKE 1 TABLET EVERY 4 HOURS AS NEEDED FOR PAIN  OR FEVER    Pain       sertraline 25 MG tablet    ZOLOFT    28 tablet    TAKE 1 TABLET DAILY.    Depression with anxiety       traZODone 50 MG tablet    DESYREL    28 tablet    TAKE ONE TABLET AT BEDTIME.    Primary insomnia

## 2017-08-09 ENCOUNTER — NURSING HOME DICTATION (OUTPATIENT)
Dept: FAMILY MEDICINE | Facility: OTHER | Age: 82
End: 2017-08-09

## 2017-08-11 NOTE — PROGRESS NOTES
Pharmacy reviewed and feels he should have a dose reduction of his omeprazole, which I feel is fine.  Toi Cowart has been on 20 mg daily, without symptoms.  Staff reports no other concerns.      OBJECTIVE:   GENERAL:  He is breathing comfortably, in no distress.     VITAL SIGNS:   His blood pressure is 116/68, pulse 72, he is afebrile, respirations 16.   CARDIAC:  Regular.   LUNGS:  Clear.      ASSESSMENT:   1.  History of progressive dementia.   2.  History of behavioral disturbance.   3.  Hypertension.   4.  History of pacemaker.   5.  History of septic shock in the past secondary to acute cholecystitis.   6.  History of gastroesophageal reflux disease.   7.  He is DNR.      PLAN:  Chart and meds reviewed.  Continue with present plans but follow the dose reduction as outlined by pharmacy recommendations.         RIDGE QUINN MD             D: 2017 08:40   T: 2017 09:05   MT: jj      Name:     TOI COWART   MRN:      [id number]        Account:      [id number]   :      1930           Visit Date:   2017      Document: [id number]       cc: [facility]

## 2017-08-16 ENCOUNTER — TELEPHONE (OUTPATIENT)
Dept: FAMILY MEDICINE | Facility: OTHER | Age: 82
End: 2017-08-16

## 2017-08-16 DIAGNOSIS — S32.020S COMPRESSION FRACTURE OF L2, SEQUELA: ICD-10-CM

## 2017-08-16 RX ORDER — HYDROCODONE BITARTRATE AND ACETAMINOPHEN 5; 325 MG/1; MG/1
1 TABLET ORAL EVERY 6 HOURS PRN
Qty: 30 TABLET | Refills: 0 | Status: SHIPPED | OUTPATIENT
Start: 2017-08-16 | End: 2017-08-17

## 2017-08-16 NOTE — TELEPHONE ENCOUNTER
Norco last filled on 7.27.17 3 30. Last nursing home visit on 7.17.17. medication pended.Thank you

## 2017-08-17 DIAGNOSIS — S32.020S COMPRESSION FRACTURE OF L2, SEQUELA: ICD-10-CM

## 2017-08-17 RX ORDER — HYDROCODONE BITARTRATE AND ACETAMINOPHEN 5; 325 MG/1; MG/1
1 TABLET ORAL EVERY 6 HOURS PRN
Qty: 30 TABLET | Refills: 0 | Status: SHIPPED | OUTPATIENT
Start: 2017-08-17 | End: 2017-09-01

## 2017-09-01 DIAGNOSIS — S32.020S COMPRESSION FRACTURE OF L2, SEQUELA: ICD-10-CM

## 2017-09-01 RX ORDER — HYDROCODONE BITARTRATE AND ACETAMINOPHEN 5; 325 MG/1; MG/1
1 TABLET ORAL EVERY 6 HOURS PRN
Qty: 30 TABLET | Refills: 0 | Status: SHIPPED | OUTPATIENT
Start: 2017-09-01 | End: 2017-09-14

## 2017-09-01 NOTE — TELEPHONE ENCOUNTER
Controlled Substance Refill Request for Norco  Problem List Complete:  No     PROVIDER TO CONSIDER COMPLETION OF PROBLEM LIST AND OVERVIEW/CONTROLLED SUBSTANCE AGREEMENT    Last Written Prescription Date:  8.17.17  Last Fill Quantity: 30,   # refills: 0    Last Office Visit with [facility] primary care provider: 8.9.17    Future Office visit:   Next 5 appointments (look out 90 days)     Sep 12, 2017 10:30 AM CDT   (Arrive by 10:15 AM)   Return Visit with  PACEMAKER   [facility] ([facility] )    [address] [place] [address] [place] [address]   [phone]                  Controlled substance agreement on file: No.     Processing:  Staff will hand deliver Rx to on-site pharmacy     checked in past 6 months?  No, route to RN

## 2017-09-06 ENCOUNTER — NURSING HOME DICTATION (OUTPATIENT)
Dept: FAMILY MEDICINE | Facility: OTHER | Age: 82
End: 2017-09-06

## 2017-09-07 NOTE — PROGRESS NOTES
SUBJECTIVE:  Staff reports no concerns and Toi Cowart has no concerns.      OBJECTIVE:   GENERAL:  He is breathing comfortably, in no distress.    VITAL SIGNS:  Respirations 18, sat 94, temperature 97, pulse 92, blood pressure 140/80, weight 181.   CARDIAC:  Regular without S3, S4.   LUNGS:  Clear.   ABDOMEN:  Soft.  No peritoneal signs.      ASSESSMENT:   1.  Progressive dementia.   2.  Behavioral disturbance.   3.  Hypertension.   4.  Pacemaker history.    5.  History of septic shock secondary to acute cholecystitis.      PLAN:  He had an order for oxygen.  We will discontinue that.  Also there is a duplex order for loperamide.  We will discontinue that.  Otherwise chart and meds reviewed.  We will continue with present plans.         RIDGE QUINN MD             D: 2017 09:00   T: 2017 09:32   MT: ALONDRA      Name:     TOI COWART   MRN:      [id number]        Account:      [id number]   :      1930           Visit Date:   2017      Document: [id number]       cc: [facility]

## 2017-09-12 ENCOUNTER — OFFICE VISIT (OUTPATIENT)
Dept: CARDIOLOGY | Facility: OTHER | Age: 82
End: 2017-09-12
Attending: INTERNAL MEDICINE
Payer: COMMERCIAL

## 2017-09-12 DIAGNOSIS — I49.5 SINOATRIAL NODE DYSFUNCTION (H): Primary | ICD-10-CM

## 2017-09-12 PROCEDURE — 93280 PM DEVICE PROGR EVAL DUAL: CPT | Mod: TC

## 2017-09-12 PROCEDURE — 93280 PM DEVICE PROGR EVAL DUAL: CPT | Mod: 26 | Performed by: INTERNAL MEDICINE

## 2017-09-12 NOTE — MR AVS SNAPSHOT
After Visit Summary   9/12/2017    Toi Cowart    MRN: [id number]           Patient Information     Date Of Birth          8/19/1930        Visit Information        Provider Department      9/12/2017 10:30 AM  PACEMAKER [facility] [place]        Today's Diagnoses     Sinoatrial node dysfunction (H)    -  1      Care Instructions    It was a pleasure to see you in clinic today.  Please do not hesitate to call with any questions or concerns.  You are scheduled for a transtelephonic pacemaker check every 5 weeks to monitor battery status.  We look forward to seeing you in clinic at your next device check in 6 months.    Maryse Carrion, RN, MS, CCRN  Electrophysiology Nurse Clinician  [facility]    During Business Hours Please Call:  [phone]  After Hours Please Call:  [phone] - select option #4 and ask for job code [phone]                        Follow-ups after your visit        Follow-up notes from your care team     Return in about 6 months (around 3/12/2018) for Pacemaker Check.      Your next 10 appointments already scheduled     Mar 13, 2018 10:30 AM CDT   (Arrive by 10:15 AM)   Return Visit with  PACEMAKER   [facility] [place] ([facility] )    [address]  [place] [address]   [phone]              Who to contact     If you have questions or need follow up information about today's clinic visit or your schedule please contact [facility] directly at [phone].  Normal or non-critical lab and imaging results will be communicated to you by MyChart, letter or phone within 4 business days after the clinic has received the results. If you do not hear from us within 7 days, please contact the clinic through MyChart or phone. If you have a critical or abnormal lab result, we will notify you by phone as soon as possible.  Submit refill requests through [employer] or call your pharmacy and they will forward the  "refill request to us. Please allow 3 business days for your refill to be completed.          Additional Information About Your Visit        MyChart Information     [employer] lets you send messages to your doctor, view your test results, renew your prescriptions, schedule appointments and more. To sign up, go to [email or web address] . Click on \"Log in\" on the left side of the screen, which will take you to the Welcome page. Then click on \"Sign up Now\" on the right side of the page.     You will be asked to enter the access code listed below, as well as some personal information. Please follow the directions to create your username and password.     Your access code is: [email or web address]  Expires: 2017 11:37 AM     Your access code will  in 90 days. If you need help or a new code, please call your [place] clinic or [phone].        Care EveryWhere ID     This is your Care EveryWhere ID. This could be used by other organizations to access your [place] medical records  [id number]         Blood Pressure from Last 3 Encounters:   17 106/54   16 134/65   16 107/73    Weight from Last 3 Encounters:   16 75.5 kg (166 lb 7.2 oz)   14 79.4 kg (175 lb)   05/15/14 90.7 kg (200 lb)              We Performed the Following     PM DEVICE PROGRAMMING EVAL, DUAL LEAD PACER        Primary Care Provider Office Phone # Fax #    R Julio Bell [phone]       [facility] [place] [address] [place] [address]        Equal Access to Services     [facility] : Hadii cristiana Girard, wamajor fong, qaestiven vaughan. So [facility] [phone].    ATENCIÓN: Si habla español, tiene a lagos disposición servicios gratuitos de asistencia lingüística. Nicole al [phone].    We comply with applicable federal civil rights laws and [place] laws. We do not discriminate on the basis of race, color, national origin, " age, disability sex, sexual orientation or gender identity.            Thank you!     [facility] you [facility]  [facility]. Our goal is always to provide you with excellent care. Hearing back from our patients is one way we can continue to improve our services. Please take a few minutes to complete the written survey that you may receive in the mail after your visit with us. Thank you!             Your Updated Medication List - Protect others around you: Learn how to safely use, store and throw away your medicines at www.disposemymeds.org.          This list is accurate as of: 9/12/17 11:37 AM.  Always use your most recent med list.                   Brand Name Dispense Instructions for use Diagnosis    calcium carbonate 500 MG chewable tablet    TUMS     Take 1 chew tab by mouth every 6 hours as needed for heartburn        CALMOSEPTINE 0.44-20.6 % Oint   Generic drug:  Menthol-Zinc Oxide     113 g    USE AS NEEDED.    Skin irritation       divalproex 125 MG CR capsule    DEPAKOTE SPRINKLE    84 capsule    TAKE (1) CAPSULE THREE TIMES DAILY.    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        GLUCOSAMINE RELIEF 500 MG Caps capsule   Generic drug:  glucosamine     60 capsule    TAKE  (1)  CAPSULE  TWICE DAILY.    Compression fracture of L2, sequela       HYDROcodone-acetaminophen 5-325 MG per tablet    NORCO    30 tablet    Take 1 tablet by mouth every 6 hours as needed for moderate to severe pain    Compression fracture of L2, sequela       loperamide 2 MG capsule    IMODIUM    120 capsule    TAKE 1 CAPSULE FOUR TIMES DAILY FOR DIARRHEA AS NEEDED    Diarrhea       NAMENDA XR 28 MG 24 hr capsule   Generic drug:  memantine XR      Take 10 mg by mouth 2 times daily        NUEDEXTA 20-10 MG per capsule   Generic drug:  dextromethorphan-quiNIDine     8 capsule    TAKE 1 CAPSULE DAILY.    Mild depressed bipolar I  disorder (H)       nystatin 276873 UNIT/GM Powd    MYCOSTATIN    60 g    Apply topically 2 times daily Apply to groin area twice daily.    Yeast infection of the skin       omeprazole 20 MG CR capsule    priLOSEC    28 capsule    TAKE 1 CAPSULE DAILY ON EMPTY STOMACH.    Gastroesophageal reflux disease without esophagitis       PROTONIX PO      Take 40 mg by mouth 2 times daily (before meals)        Rectal Barrier     90 g    Apply to affected areas as needed daily    Generalized muscle weakness       RISPERDAL PO      Take 0.5 mg by mouth every 4 hours as needed        SB IBUPROFEN 200 MG tablet   Generic drug:  ibuprofen     100 tablet    TAKE 1 TABLET EVERY 4 HOURS AS NEEDED FOR PAIN OR FEVER    Pain       sertraline 25 MG tablet    ZOLOFT    28 tablet    TAKE 1 TABLET DAILY.    Depression with anxiety       traZODone 50 MG tablet    DESYREL    28 tablet    TAKE ONE TABLET AT BEDTIME.    Primary insomnia

## 2017-09-12 NOTE — PATIENT INSTRUCTIONS
It was a pleasure to see you in clinic today.  Please do not hesitate to call with any questions or concerns.  You are scheduled for a transtelephonic pacemaker check every 5 weeks to monitor battery status.  We look forward to seeing you in clinic at your next device check in 6 months.    Maryse Carrion, RN, MS, CCRN  Electrophysiology Nurse Clinician  [facility]    During Business Hours Please Call:  [phone]  After Hours Please Call:  [phone] - select option #4 and ask for job code [phone]

## 2017-09-12 NOTE — PROGRESS NOTES
Patient seen in clinic for evaluation and iterative programming of his Medtronic dual lead pacemaker per MD orders.  Normal pacemaker function.  8 AHR episodes recorded - < 1 min - 13 min 36 sec in duration.  Intrinsic rhythm = NSR with  @ 30 bpm.  AP = 7.8%.   = 99.9%.  Estimated battery longevity to YON = 10 months (minimum < 1 month - maximum 20 months).  Patient reports that he is feeling well and denies specific complaints.  Plan for transtelephonic pacemaker checks every 5 weeks and return to clinic in 6 months.    Dual lead pacemaker

## 2017-09-14 DIAGNOSIS — S32.020S COMPRESSION FRACTURE OF L2, SEQUELA: ICD-10-CM

## 2017-09-14 RX ORDER — HYDROCODONE BITARTRATE AND ACETAMINOPHEN 5; 325 MG/1; MG/1
1 TABLET ORAL EVERY 6 HOURS PRN
Qty: 30 TABLET | Refills: 0 | Status: SHIPPED | OUTPATIENT
Start: 2017-09-14 | End: 2017-10-02

## 2017-09-14 NOTE — TELEPHONE ENCOUNTER
norco      Last Written Prescription Date: 9/1/17  Last Fill Quantity: 30,  # refills: 0   Last Office Visit with G, P or [facility] prescribing provider: 9/6/17

## 2017-10-02 DIAGNOSIS — S32.020S COMPRESSION FRACTURE OF L2, SEQUELA: ICD-10-CM

## 2017-10-02 RX ORDER — HYDROCODONE BITARTRATE AND ACETAMINOPHEN 5; 325 MG/1; MG/1
1 TABLET ORAL EVERY 6 HOURS PRN
Qty: 30 TABLET | Refills: 0 | COMMUNITY
Start: 2017-09-30 | End: 2017-10-11

## 2017-10-04 ENCOUNTER — NURSING HOME DICTATION (OUTPATIENT)
Dept: FAMILY MEDICINE | Facility: OTHER | Age: 82
End: 2017-10-04

## 2017-10-04 NOTE — PROGRESS NOTES
SUBEJCTIVE:  Staff notes that Toi Cowart is not needing Tums, that can be discontinued.  Also, Pharmacy wondering about evaluation for dose reduction of his psychotropics.  He is on Depakote, Zoloft, and trazodone.  He has a history of bipolar, plus agitated behavior.  At this point, I feel it would be detrimental to try a dose reduction.  He is cooperative, pleasant, and it would be inappropriate to try to do a dose reduction because he has displayed dangerous behavior in the past.      OBJECTIVE:   VITAL SIGNS:  Pulse 68, respirations 18, blood pressure 128/80, temperature 98, sat 96 on room air.   CARDIAC:  Regular.   LUNGS:  Clear.   ABDOMEN:  Soft.  Positive bowel sounds.  No peritoneal signs.      ASSESSMENT:  Progressive dementia with behavioral disturbance that is stable at this time,  has hypertension, history of pacemaker, and in the distant past had septic shock secondary to acute cholecystitis.      PLAN:  Chart and meds reviewed.  Continue with current plans, will not do a dose reduction on his psych meds.  He seems to be stable.         RIDGE QUINN MD             D: 10/04/2017 08:50   T: 10/04/2017 10:09   MT: TOMMY      Name:     TOI COWART   MRN:      [id number]        Account:      [id number]   :      1930           Visit Date:   10/04/2017      Document: [id number]       cc: [facility]

## 2017-10-11 DIAGNOSIS — S32.020S COMPRESSION FRACTURE OF L2, SEQUELA: ICD-10-CM

## 2017-10-11 RX ORDER — HYDROCODONE BITARTRATE AND ACETAMINOPHEN 5; 325 MG/1; MG/1
1 TABLET ORAL 2 TIMES DAILY
Qty: 56 TABLET | Refills: 0 | Status: SHIPPED | OUTPATIENT
Start: 2017-10-11 | End: 2017-10-26

## 2017-10-11 NOTE — TELEPHONE ENCOUNTER
norco      Last Written Prescription Date: 9/30/17  Last Fill Quantity: 30,  # refills: 0   Last Office Visit with G, [facility] or [facility] prescribing provider: 10/4/17

## 2017-10-26 DIAGNOSIS — S32.020S COMPRESSION FRACTURE OF L2, SEQUELA: ICD-10-CM

## 2017-10-26 RX ORDER — HYDROCODONE BITARTRATE AND ACETAMINOPHEN 5; 325 MG/1; MG/1
1 TABLET ORAL 2 TIMES DAILY
Qty: 56 TABLET | Refills: 0 | Status: SHIPPED | OUTPATIENT
Start: 2017-10-26 | End: 2017-11-06

## 2017-10-26 NOTE — TELEPHONE ENCOUNTER
norco      Last Written Prescription Date: 10/11/17  Last Fill Quantity: 56,  # refills: 0   Last Office Visit with G, [facility] or [facility] prescribing provider: 10/4/17

## 2017-10-31 ENCOUNTER — OFFICE VISIT (OUTPATIENT)
Dept: CARDIOLOGY | Facility: CLINIC | Age: 82
End: 2017-10-31
Attending: INTERNAL MEDICINE
Payer: COMMERCIAL

## 2017-10-31 DIAGNOSIS — I49.5 SINOATRIAL NODE DYSFUNCTION (H): Primary | ICD-10-CM

## 2017-10-31 PROCEDURE — 93293 PM PHONE R-STRIP DEVICE EVAL: CPT | Performed by: INTERNAL MEDICINE

## 2017-10-31 PROCEDURE — 93293 PM PHONE R-STRIP DEVICE EVAL: CPT | Mod: ZF

## 2017-10-31 NOTE — PROGRESS NOTES
Scheduled transtelephonic pacemaker check done per MD orders.  Normal pacemaker function.  Presenting EGM = AS- @ 67 bpm.  30 second presenting, magnet and post-magnet EGM's saved.  Magnet response within normal limits.  Magnet rate = 85.47 bpm.  Normal pacemaker function.  Nurse caring for patient reports that he is feeling well and denies specific complaints.  Plan for next TTM pacemaker check on 12/5/2017 @ 9:30 am.    Transtelephonic pacemaker check

## 2017-10-31 NOTE — LETTER
10/31/2017      RE: Toi Cowart  [address]   [place] [address]       Dear Colleague,    Thank you for the opportunity to participate in the care of your patient, Toi Cowart, at the [facility]. Please see a copy of my visit note below.    Scheduled transtelephonic pacemaker check done per MD orders.  Normal pacemaker function.  Presenting EGM = AS- @ 67 bpm.  30 second presenting, magnet and post-magnet EGM's saved.  Magnet response within normal limits.  Magnet rate = 85.47 bpm.  Normal pacemaker function.  Nurse caring for patient reports that he is feeling well and denies specific complaints.  Plan for next TTM pacemaker check on 12/5/2017 @ 9:30 am.    Transtelephonic pacemaker check

## 2017-10-31 NOTE — MR AVS SNAPSHOT
"              After Visit Summary   10/31/2017    Toi Cowart    MRN: [id number]           Patient Information     Date Of Birth          8/19/1930        Visit Information        Provider Department      10/31/2017 6:00 AM  ICD REMOTE [facility]        Today's Diagnoses     Sinoatrial node dysfunction (H)    -  1       Follow-ups after your visit        Your next 10 appointments already scheduled     Mar 13, 2018 10:30 AM CDT   (Arrive by 10:15 AM)   Return Visit with HC PACEMAKER   [facility] [place] ([facility] [place] )    [address]  [place] [address]   [phone]              Who to contact     If you have questions or need follow up information about today's clinic visit or your schedule please contact [facility] directly at [phone].  Normal or non-critical lab and imaging results will be communicated to you by MyChart, letter or phone within 4 business days after the clinic has received the results. If you do not hear from us within 7 days, please contact the clinic through MyChart or phone. If you have a critical or abnormal lab result, we will notify you by phone as soon as possible.  Submit refill requests through [employer] or call your pharmacy and they will forward the refill request to us. Please allow 3 business days for your refill to be completed.          Additional Information About Your Visit        MyChart Information     [employer] lets you send messages to your doctor, view your test results, renew your prescriptions, schedule appointments and more. To sign up, go to [email or web address] . Click on \"Log in\" on the left side of the screen, which will take you to the Welcome page. Then click on \"Sign up Now\" on the right side of the page.     You will be asked to enter the access code listed below, as well as some personal information. Please follow the directions to create your username and password.     Your access code is: " [email or web address]  Expires: 2017 11:37 AM     Your access code will  in 90 days. If you need help or a new code, please call your [place] clinic or [phone].        Care EveryWhere ID     This is your Care EveryWhere ID. This could be used by other organizations to access your [place] medical records  [id number]         Blood Pressure from Last 3 Encounters:   17 106/54   16 134/65   16 107/73    Weight from Last 3 Encounters:   16 75.5 kg (166 lb 7.2 oz)   14 79.4 kg (175 lb)   05/15/14 90.7 kg (200 lb)              We Performed the Following     PM PHONE R STRIP EVAL UP TO 90 DAYS        Primary Care Provider Office Phone # Fax #    R Julio Bell [phone]       [facility] [place] [address] [place] [address]  [facility] [address]        Equal Access to Services     SINAN RÍOS : Hadii aad ku hadasho Soomaali, waaxda luqadaha, qaybta kaalmada adeegyada, waxay idiin hayaan jimyeg hebert dawson . So [facility] [phone].    ATENCIÓN: Si habla español, tiene a lagos disposición servicios gratuitos de asistencia lingüística. Llame al [phone].    We comply with applicable federal civil rights laws and [place] laws. We do not discriminate on the basis of race, color, national origin, age, disability, sex, sexual orientation, or gender identity.            Thank you!     Thank you for choosing [facility]  for your care. Our goal is always to provide you with excellent care. Hearing back from our patients is one way we can continue to improve our services. Please take a few minutes to complete the written survey that you may receive in the mail after your visit with us. Thank you!             Your Updated Medication List - Protect others around you: Learn how to safely use, store and throw away your medicines at www.disposemymeds.org.          This list is accurate as of: 10/31/17  9:55 AM.  Always use your most recent med list.                   Brand  Name Dispense Instructions for use Diagnosis    calcium carbonate 500 MG chewable tablet    TUMS     Take 1 chew tab by mouth every 6 hours as needed for heartburn        CALMOSEPTINE 0.44-20.6 % Oint   Generic drug:  Menthol-Zinc Oxide     113 g    USE AS NEEDED.    Skin irritation       divalproex 125 MG CR capsule    DEPAKOTE SPRINKLE    84 capsule    TAKE (1) CAPSULE THREE TIMES DAILY.    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        GLUCOSAMINE RELIEF 500 MG Caps capsule   Generic drug:  glucosamine     60 capsule    TAKE  (1)  CAPSULE  TWICE DAILY.    Compression fracture of L2, sequela       HYDROcodone-acetaminophen 5-325 MG per tablet    NORCO    56 tablet    Take 1 tablet by mouth 2 times daily    Compression fracture of L2, sequela       loperamide 2 MG capsule    IMODIUM    120 capsule    TAKE 1 CAPSULE FOUR TIMES DAILY FOR DIARRHEA AS NEEDED    Diarrhea       NAMENDA XR 28 MG 24 hr capsule   Generic drug:  memantine XR      Take 10 mg by mouth 2 times daily        NUEDEXTA 20-10 MG per capsule   Generic drug:  dextromethorphan-quiNIDine     8 capsule    TAKE 1 CAPSULE DAILY.    Mild depressed bipolar I disorder (H)       nystatin [id number] UNIT/GM Powd    MYCOSTATIN    60 g    Apply topically 2 times daily Apply to groin area twice daily.    Yeast infection of the skin       omeprazole 20 MG CR capsule    priLOSEC    28 capsule    TAKE 1 CAPSULE DAILY ON EMPTY STOMACH.    Gastroesophageal reflux disease without esophagitis       PROTONIX PO      Take 40 mg by mouth 2 times daily (before meals)        Rectal Barrier     90 g    Apply to affected areas as needed daily    Generalized muscle weakness       RISPERDAL PO      Take 0.5 mg by mouth every 4 hours as needed        SB IBUPROFEN 200 MG tablet   Generic drug:  ibuprofen     100 tablet    TAKE 1 TABLET EVERY 4 HOURS AS NEEDED FOR PAIN OR FEVER    Pain       sertraline  25 MG tablet    ZOLOFT    28 tablet    TAKE 1 TABLET DAILY.    Depression with anxiety       traZODone 50 MG tablet    DESYREL    28 tablet    TAKE ONE TABLET AT BEDTIME.    Primary insomnia

## 2017-11-01 ENCOUNTER — NURSING HOME DICTATION (OUTPATIENT)
Dept: CARDIOLOGY | Facility: CLINIC | Age: 82
End: 2017-11-01

## 2017-11-02 NOTE — PROGRESS NOTES
SUBJECTIVE:  Staff reports no acute concerns.      OBJECTIVE:   GENERAL:  He is sitting in a wheelchair, breathing comfortably, in no distress.     VITAL SIGNS:  Respirations 20, O2 sats 96, temperature 98, pulse 66.  Blood pressure 129/64, weight 174.6.   CARDIAC:  Regular.   LUNGS:  Clear.   ABDOMEN:  Soft.      ASSESSMENT:   Dementia with behavioral disturbance, history of septic shock secondary to acute cholecystitis, history of pacemaker and hypertension.      PLAN:  Chart and meds reviewed.  We will continue with present plans.         RIDGE QUINN MD             D: 2017 08:43   T: 2017 09:58   MT: SIMONE      Name:     ESA LEON   MRN:      [id number]        Account:      [id number]   :      1930           Visit Date:   2017      Document: [id number]       [facility] [place]

## 2017-11-06 DIAGNOSIS — S32.020S COMPRESSION FRACTURE OF L2, SEQUELA: ICD-10-CM

## 2017-11-06 RX ORDER — HYDROCODONE BITARTRATE AND ACETAMINOPHEN 5; 325 MG/1; MG/1
1 TABLET ORAL 2 TIMES DAILY
Qty: 56 TABLET | Refills: 0 | Status: SHIPPED | OUTPATIENT
Start: 2017-11-06 | End: 2017-12-12

## 2017-11-06 NOTE — TELEPHONE ENCOUNTER
norco      Last Written Prescription Date: 10/26/17  Last Fill Quantity: 56,  # refills: 0   Last Office Visit with G, [facility] or [facility] prescribing provider: 10/11/17

## 2017-12-05 ENCOUNTER — OFFICE VISIT (OUTPATIENT)
Dept: CARDIOLOGY | Facility: CLINIC | Age: 82
End: 2017-12-05
Attending: INTERNAL MEDICINE
Payer: COMMERCIAL

## 2017-12-05 DIAGNOSIS — I49.5 SINOATRIAL NODE DYSFUNCTION (H): Primary | ICD-10-CM

## 2017-12-05 PROCEDURE — 93293 PM PHONE R-STRIP DEVICE EVAL: CPT | Mod: 26 | Performed by: INTERNAL MEDICINE

## 2017-12-05 PROCEDURE — 93293 PM PHONE R-STRIP DEVICE EVAL: CPT | Mod: ZF

## 2017-12-05 NOTE — LETTER
12/5/2017      RE: Toi Cowart  [address]   [place] [address]       Dear Colleague,    Thank you for the opportunity to participate in the care of your patient, Toi Cowart, at the [facility]. Please see a copy of my visit note below.    Scheduled transtelephonic pacemaker check done per MD orders.  Normal pacemaker function.  Presenting EGM = AS- @ 69 bpm.  30 second presenting, magnet and post-magnet EGM's saved.  Magnet response within normal limits.  Magnet rate = 85.47 bpm.  Normal pacemaker function.  Nurse caring for patient reports that he is feeling well and denies specific complaints. Reviewed by Connie R. Device RN. Plan for next TTM pacemaker check on 1/9/2018 @ 9:30 am.    Transtelephonic pacemaker check    Please do not hesitate to contact me if you have any questions/concerns.     Sincerely,     ICD Remote

## 2017-12-05 NOTE — MR AVS SNAPSHOT
"              After Visit Summary   12/5/2017    Toi Cowart    MRN: [id number]           Patient Information     Date Of Birth          8/19/1930        Visit Information        Provider Department      12/5/2017 6:00 AM  ICD REMOTE [facility]        Today's Diagnoses     Sinoatrial node dysfunction (H)    -  1       Follow-ups after your visit        Your next 10 appointments already scheduled     Mar 13, 2018 10:30 AM CDT   (Arrive by 10:15 AM)   Return Visit with HC PACEMAKER   [facility] [place] ([facility] [place] )    [address]  [place] [address]   [phone]              Who to contact     If you have questions or need follow up information about today's clinic visit or your schedule please contact [facility] directly at [phone].  Normal or non-critical lab and imaging results will be communicated to you by MyChart, letter or phone within 4 business days after the clinic has received the results. If you do not hear from us within 7 days, please contact the clinic through MyChart or phone. If you have a critical or abnormal lab result, we will notify you by phone as soon as possible.  Submit refill requests through [employer] or call your pharmacy and they will forward the refill request to us. Please allow 3 business days for your refill to be completed.          Additional Information About Your Visit        MyChart Information     [employer] lets you send messages to your doctor, view your test results, renew your prescriptions, schedule appointments and more. To sign up, go to [email or web address] . Click on \"Log in\" on the left side of the screen, which will take you to the Welcome page. Then click on \"Sign up Now\" on the right side of the page.     You will be asked to enter the access code listed below, as well as some personal information. Please follow the directions to create your username and password.     Your access code is: " [email or web address]  Expires: 3/12/2018  7:12 AM     Your access code will  in 90 days. If you need help or a new code, please call your [facility] clinic or [phone].        Care EveryWhere ID     This is your Care EveryWhere ID. This could be used by other organizations to access your [facility] medical records  [id number]         Blood Pressure from Last 3 Encounters:   17 106/54   16 134/65   16 107/73    Weight from Last 3 Encounters:   16 75.5 kg (166 lb 7.2 oz)   14 79.4 kg (175 lb)   05/15/14 90.7 kg (200 lb)              We Performed the Following     PM PHONE R STRIP EVAL UP TO 90 DAYS        Primary Care Provider Office Phone # Fax #    R Julio Bell [phone]       [facility] [place] [address] [place] [address]  [place] [address]        Equal Access to Services     SINAN RÍOS : Hadii aad ku hadasho Soomaali, waaxda luqadaha, qaybta kaalmada adeegyada, waxay idiin hayaan adeeg jorge luisarahugo dawson . So [facility] [phone].    ATENCIÓN: Si habla español, tiene a lagos disposición servicios gratuitos de asistencia lingüística. Llame al [phone].    We comply with applicable federal civil rights laws and [place] laws. We do not discriminate on the basis of race, color, national origin, age, disability, sex, sexual orientation, or gender identity.            Thank you!     Thank you for choosing [facility]  for your care. Our goal is always to provide you with excellent care. Hearing back from our patients is one way we can continue to improve our services. Please take a few minutes to complete the written survey that you may receive in the mail after your visit with us. Thank you!             Your Updated Medication List - Protect others around you: Learn how to safely use, store and throw away your medicines at www.disposemymeds.org.          This list is accurate as of: 17 11:59 PM.  Always use your most recent med list.                   Brand  Name Dispense Instructions for use Diagnosis    calcium carbonate 500 MG chewable tablet    TUMS     Take 1 chew tab by mouth every 6 hours as needed for heartburn        CALMOSEPTINE 0.44-20.6 % Oint   Generic drug:  Menthol-Zinc Oxide     113 g    USE AS NEEDED.    Skin irritation       divalproex 125 MG CR capsule    DEPAKOTE SPRINKLE    84 capsule    TAKE (1) CAPSULE THREE TIMES DAILY.    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        GLUCOSAMINE RELIEF 500 MG Caps capsule   Generic drug:  glucosamine     60 capsule    TAKE  (1)  CAPSULE  TWICE DAILY.    Compression fracture of L2, sequela       HYDROcodone-acetaminophen 5-325 MG per tablet    NORCO    56 tablet    Take 1 tablet by mouth 2 times daily    Compression fracture of L2, sequela       loperamide 2 MG capsule    IMODIUM    120 capsule    TAKE 1 CAPSULE FOUR TIMES DAILY FOR DIARRHEA AS NEEDED    Diarrhea       NAMENDA XR 28 MG 24 hr capsule   Generic drug:  memantine XR      Take 10 mg by mouth 2 times daily        NUEDEXTA 20-10 MG per capsule   Generic drug:  dextromethorphan-quiNIDine     8 capsule    TAKE 1 CAPSULE DAILY.    Mild depressed bipolar I disorder (H)       nystatin 127206 UNIT/GM Powd    MYCOSTATIN    60 g    Apply topically 2 times daily Apply to groin area twice daily.    Yeast infection of the skin       omeprazole 20 MG CR capsule    priLOSEC    28 capsule    TAKE 1 CAPSULE DAILY ON EMPTY STOMACH.    Gastroesophageal reflux disease without esophagitis       PROTONIX PO      Take 40 mg by mouth 2 times daily (before meals)        Rectal Barrier     90 g    Apply to affected areas as needed daily    Generalized muscle weakness       RISPERDAL PO      Take 0.5 mg by mouth every 4 hours as needed        SB IBUPROFEN 200 MG tablet   Generic drug:  ibuprofen     100 tablet    TAKE 1 TABLET EVERY 4 HOURS AS NEEDED FOR PAIN OR FEVER    Pain       sertraline  25 MG tablet    ZOLOFT    28 tablet    TAKE 1 TABLET DAILY.    Depression with anxiety       traZODone 50 MG tablet    DESYREL    28 tablet    TAKE ONE TABLET AT BEDTIME.    Primary insomnia

## 2017-12-06 ENCOUNTER — NURSING HOME DICTATION (OUTPATIENT)
Dept: FAMILY MEDICINE | Facility: OTHER | Age: 82
End: 2017-12-06

## 2017-12-06 NOTE — PROGRESS NOTES
SUBJECTIVE:  Staff reports no concerns for Toi Cowart.  Pharmacy is requesting a BMP.  History of hypertension.      OBJECTIVE:   VITAL SIGNS:  Respirations 16, sat is 97, pulse 74, temperature 97.4, blood pressure 122/78.  Last weight was 182.6.  Previously it was 180.6.   GENERAL:  He is resting, breathing comfortably.   CARDIAC:  Regular.   LUNGS:  Clear.      ASSESSMENT:  History of hypertension, dementia, history of coronary artery disease, sick sinus syndrome.      PLAN:  I will check a BMP, otherwise chart and meds reviewed.  Will continue with present plans.         RIDGE QUINN MD             D: 2017 08:50   T: 2017 10:04   MT: BENITO      Name:     TOI COWART   MRN:      [id number]        Account:      [id number]   :      1930           Visit Date:   2017      Document: [id number]       cc: [facility]

## 2017-12-12 DIAGNOSIS — S32.020S COMPRESSION FRACTURE OF L2, SEQUELA: ICD-10-CM

## 2017-12-12 RX ORDER — HYDROCODONE BITARTRATE AND ACETAMINOPHEN 5; 325 MG/1; MG/1
1 TABLET ORAL 2 TIMES DAILY
Qty: 56 TABLET | Refills: 0 | Status: SHIPPED | OUTPATIENT
Start: 2017-12-12 | End: 2018-01-03

## 2017-12-12 NOTE — PROGRESS NOTES
Scheduled transtelephonic pacemaker check done per MD orders.  Normal pacemaker function.  Presenting EGM = AS- @ 69 bpm.  30 second presenting, magnet and post-magnet EGM's saved.  Magnet response within normal limits.  Magnet rate = 85.47 bpm.  Normal pacemaker function.  Nurse caring for patient reports that he is feeling well and denies specific complaints. Reviewed by Connie R. Device RN. Plan for next TTM pacemaker check on 1/9/2018 @ 9:30 am.    Transtelephonic pacemaker check

## 2017-12-12 NOTE — TELEPHONE ENCOUNTER
norco      Last Written Prescription Date: 11/6/17  Last Fill Quantity: 56,  # refills: 0   Last Office Visit with G, P or [facility] prescribing provider: 12/6/17

## 2018-01-01 ENCOUNTER — NURSING HOME DICTATION (OUTPATIENT)
Dept: FAMILY MEDICINE | Facility: OTHER | Age: 83
End: 2018-01-01

## 2018-01-01 ENCOUNTER — TRANSFERRED RECORDS (OUTPATIENT)
Dept: HEALTH INFORMATION MANAGEMENT | Facility: CLINIC | Age: 83
End: 2018-01-01

## 2018-01-01 ENCOUNTER — ALLIED HEALTH/NURSE VISIT (OUTPATIENT)
Dept: CARDIOLOGY | Facility: CLINIC | Age: 83
End: 2018-01-01
Attending: INTERNAL MEDICINE
Payer: COMMERCIAL

## 2018-01-01 ENCOUNTER — TELEPHONE (OUTPATIENT)
Dept: FAMILY MEDICINE | Facility: OTHER | Age: 83
End: 2018-01-01

## 2018-01-01 DIAGNOSIS — R52 PAIN: ICD-10-CM

## 2018-01-01 DIAGNOSIS — I49.5 SINOATRIAL NODE DYSFUNCTION (H): Primary | ICD-10-CM

## 2018-01-01 DIAGNOSIS — F02.80 DEMENTIA DUE TO MEDICAL CONDITION WITHOUT BEHAVIORAL DISTURBANCE (H): ICD-10-CM

## 2018-01-01 DIAGNOSIS — R52 PAIN: Primary | ICD-10-CM

## 2018-01-01 PROCEDURE — 93294 REM INTERROG EVL PM/LDLS PM: CPT | Performed by: INTERNAL MEDICINE

## 2018-01-01 PROCEDURE — 93296 REM INTERROG EVL PM/IDS: CPT | Mod: ZF

## 2018-01-01 RX ORDER — HYDROCODONE BITARTRATE AND ACETAMINOPHEN 5; 325 MG/1; MG/1
TABLET ORAL
Qty: 56 TABLET | Refills: 0 | Status: SHIPPED | OUTPATIENT
Start: 2018-01-01 | End: 2019-01-01

## 2018-01-01 RX ORDER — HYDROCODONE BITARTRATE AND ACETAMINOPHEN 5; 325 MG/1; MG/1
1 TABLET ORAL 2 TIMES DAILY PRN
Qty: 56 TABLET | Refills: 0 | Status: SHIPPED | OUTPATIENT
Start: 2018-01-01 | End: 2018-01-01

## 2018-01-01 RX ORDER — HYDROCODONE BITARTRATE AND ACETAMINOPHEN 5; 325 MG/1; MG/1
1 TABLET ORAL 2 TIMES DAILY PRN
Qty: 10 TABLET | Refills: 0 | Status: SHIPPED | OUTPATIENT
Start: 2018-01-01 | End: 2018-01-01

## 2018-01-01 RX ORDER — DIVALPROEX SODIUM 250 MG/1
250 TABLET, DELAYED RELEASE ORAL 2 TIMES DAILY
Qty: 60 TABLET | Refills: 1 | Status: SHIPPED | OUTPATIENT
Start: 2018-01-01

## 2018-01-03 ENCOUNTER — NURSING HOME DICTATION (OUTPATIENT)
Dept: FAMILY MEDICINE | Facility: OTHER | Age: 83
End: 2018-01-03

## 2018-01-03 DIAGNOSIS — S32.020S COMPRESSION FRACTURE OF L2, SEQUELA: ICD-10-CM

## 2018-01-03 RX ORDER — HYDROCODONE BITARTRATE AND ACETAMINOPHEN 5; 325 MG/1; MG/1
1 TABLET ORAL 2 TIMES DAILY
Qty: 56 TABLET | Refills: 0 | Status: SHIPPED | OUTPATIENT
Start: 2018-01-03 | End: 2018-01-31

## 2018-01-03 NOTE — PROGRESS NOTES
SUBJECTIVE:  Staff reports no concerns.      OBJECTIVE:   VITAL SIGNS:  Respirations 16, sats 95, temperature 97, pulse 82, blood pressure 120/68.   CARDIAC:  Regular without S3, S4.   LUNGS:  Clear.   GENERAL:  Sitting in a wheelchair, alert, smiling and seems to be in good mood.      ASSESSMENT:  Progressive dementia, history of coronary artery disease, sick sinus syndrome, hypertension.      PLAN:  Chart and meds reviewed.  Continue with present plans.         RIDGE QUINN MD             D: 2018 09:14   T: 2018 09:44   MT:       Name:     ESA LEON   MRN:      [id number]        Account:      [id number]   :      1930           Visit Date:   2018      Document: [id number]       [facility]

## 2018-01-03 NOTE — TELEPHONE ENCOUNTER
norco      Last Written Prescription Date: 12/12/17  Last Fill Quantity: 56,  # refills: 0   Last Office Visit with [facility], [facility] or [facility] prescribing provider: 12/6/17                                         Next 5 appointments (look out 90 days)     Mar 13, 2018 10:30 AM CDT   (Arrive by 10:15 AM)   Return Visit with HC PACEMAKER   [facility] ([facility]    [address] [place] [address]   [phone]

## 2018-01-09 ENCOUNTER — OFFICE VISIT (OUTPATIENT)
Dept: CARDIOLOGY | Facility: CLINIC | Age: 83
End: 2018-01-09
Attending: INTERNAL MEDICINE
Payer: COMMERCIAL

## 2018-01-09 DIAGNOSIS — I49.5 SINOATRIAL NODE DYSFUNCTION (H): Primary | ICD-10-CM

## 2018-01-09 PROCEDURE — 93293 PM PHONE R-STRIP DEVICE EVAL: CPT | Mod: ZF

## 2018-01-09 PROCEDURE — 93293 PM PHONE R-STRIP DEVICE EVAL: CPT | Performed by: INTERNAL MEDICINE

## 2018-01-09 NOTE — MR AVS SNAPSHOT
"              After Visit Summary   1/9/2018    Toi Cowart    MRN: [id number]           Patient Information     Date Of Birth          8/19/1930        Visit Information        Provider Department      1/9/2018 6:00 AM  ICD REMOTE [facility]        Today's Diagnoses     Sinoatrial node dysfunction (H)    -  1       Follow-ups after your visit        Your next 10 appointments already scheduled     Mar 13, 2018 10:30 AM CDT   (Arrive by 10:15 AM)   Return Visit with HC PACEMAKER   [facility] [place] ([facility] [place] )    [address]  [place] [address]   [phone]              Who to contact     If you have questions or need follow up information about today's clinic visit or your schedule please contact [facility] directly at [phone].  Normal or non-critical lab and imaging results will be communicated to you by MyChart, letter or phone within 4 business days after the clinic has received the results. If you do not hear from us within 7 days, please contact the clinic through MyChart or phone. If you have a critical or abnormal lab result, we will notify you by phone as soon as possible.  Submit refill requests through [facility] or call your pharmacy and they will forward the refill request to us. Please allow 3 business days for your refill to be completed.          Additional Information About Your Visit        MyChart Information     [facility] lets you send messages to your doctor, view your test results, renew your prescriptions, schedule appointments and more. To sign up, go to [email or web address] . Click on \"Log in\" on the left side of the screen, which will take you to the Welcome page. Then click on \"Sign up Now\" on the right side of the page.     You will be asked to enter the access code listed below, as well as some personal information. Please follow the directions to create your username and password.     Your access code is: " [email or web address]  Expires: 3/12/2018  7:12 AM     Your access code will  in 90 days. If you need help or a new code, please call your [place] clinic or [phone].        Care EveryWhere ID     This is your Care EveryWhere ID. This could be used by other organizations to access your [place] medical records  [id number]         Blood Pressure from Last 3 Encounters:   17 106/54   16 134/65   16 107/73    Weight from Last 3 Encounters:   16 75.5 kg (166 lb 7.2 oz)   14 79.4 kg (175 lb)   05/15/14 90.7 kg (200 lb)              We Performed the Following     PM PHONE R STRIP EVAL UP TO 90 DAYS        Primary Care Provider Office Phone # Fax #    R Julio Bell [phone]       [facility] [place] [address] [place] [address]  [place] [address]        Equal Access to Services     SINAN RÍOS : Hadii aad ku hadasho Soomaali, waaxda luqadaha, qaybta kaalmada adeegyada, waxay idiin hayaan adeeg hebert dawson . So [facility] [phone].    ATENCIÓN: Si habla español, tiene a lagos disposición servicios gratuitos de asistencia lingüística. Llame al [phone].    We comply with applicable federal civil rights laws and [place] laws. We do not discriminate on the basis of race, color, national origin, age, disability, sex, sexual orientation, or gender identity.            Thank you!     Thank you for choosing [facility]  for your care. Our goal is always to provide you with excellent care. Hearing back from our patients is one way we can continue to improve our services. Please take a few minutes to complete the written survey that you may receive in the mail after your visit with us. Thank you!             Your Updated Medication List - Protect others around you: Learn how to safely use, store and throw away your medicines at www.disposemymeds.org.          This list is accurate as of: 18 11:59 PM.  Always use your most recent med list.                   Brand  Name Dispense Instructions for use Diagnosis    calcium carbonate 500 MG chewable tablet    TUMS     Take 1 chew tab by mouth every 6 hours as needed for heartburn        CALMOSEPTINE 0.44-20.6 % Oint   Generic drug:  Menthol-Zinc Oxide     113 g    USE AS NEEDED.    Skin irritation       divalproex 125 MG CR capsule    DEPAKOTE SPRINKLE    84 capsule    TAKE (1) CAPSULE THREE TIMES DAILY.    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        GLUCOSAMINE RELIEF 500 MG Caps capsule   Generic drug:  glucosamine     60 capsule    TAKE  (1)  CAPSULE  TWICE DAILY.    Compression fracture of L2, sequela       HYDROcodone-acetaminophen 5-325 MG per tablet    NORCO    56 tablet    Take 1 tablet by mouth 2 times daily    Compression fracture of L2, sequela       loperamide 2 MG capsule    IMODIUM    120 capsule    TAKE 1 CAPSULE FOUR TIMES DAILY FOR DIARRHEA AS NEEDED    Diarrhea       NAMENDA XR 28 MG 24 hr capsule   Generic drug:  memantine XR      Take 10 mg by mouth 2 times daily        NUEDEXTA 20-10 MG per capsule   Generic drug:  dextromethorphan-quiNIDine     8 capsule    TAKE 1 CAPSULE DAILY.    Mild depressed bipolar I disorder (H)       nystatin 122477 UNIT/GM Powd    MYCOSTATIN    60 g    Apply topically 2 times daily Apply to groin area twice daily.    Yeast infection of the skin       omeprazole 20 MG CR capsule    priLOSEC    28 capsule    TAKE 1 CAPSULE DAILY ON EMPTY STOMACH.    Gastroesophageal reflux disease without esophagitis       PROTONIX PO      Take 40 mg by mouth 2 times daily (before meals)        Rectal Barrier     90 g    Apply to affected areas as needed daily    Generalized muscle weakness       RISPERDAL PO      Take 0.5 mg by mouth every 4 hours as needed        SB IBUPROFEN 200 MG tablet   Generic drug:  ibuprofen     100 tablet    TAKE 1 TABLET EVERY 4 HOURS AS NEEDED FOR PAIN OR FEVER    Pain       sertraline  25 MG tablet    ZOLOFT    28 tablet    TAKE 1 TABLET DAILY.    Depression with anxiety       traZODone 50 MG tablet    DESYREL    28 tablet    TAKE ONE TABLET AT BEDTIME.    Primary insomnia

## 2018-01-09 NOTE — LETTER
1/9/2018      RE: Toi Cowart  [address]   [place] [address]       Dear Colleague,    Thank you for the opportunity to participate in the care of your patient, Toi Cowart, at the [facility] HEART [facility]. Please see a copy of my visit note below.    Scheduled transtelephonic pacemaker check done per MD orders.  Normal pacemaker function.  Presenting EGM = AS- - AP- @ 66 bpm.  30 second presenting, magnet and post-magnet EGM's saved.  Magnet response within normal limits.  Magnet rate = 85.47 bpm.  Normal pacemaker function.  Nurse caring for patient reports that he is feeling well and denies specific complaints. Reviewed by Lisa A. Device RN. Plan for next TTM pacemaker check on 2/13/2018 @ 1:30 pm.    Transtelephonic pacemaker check    Please do not hesitate to contact me if you have any questions/concerns.     Sincerely,     ICD Remote

## 2018-01-10 NOTE — PROGRESS NOTES
Scheduled transtelephonic pacemaker check done per MD orders.  Normal pacemaker function.  Presenting EGM = AS- - AP- @ 66 bpm.  30 second presenting, magnet and post-magnet EGM's saved.  Magnet response within normal limits.  Magnet rate = 85.47 bpm.  Normal pacemaker function.  Nurse caring for patient reports that he is feeling well and denies specific complaints. Reviewed by Lisa A. Device RN. Plan for next TTM pacemaker check on 2/13/2018 @ 1:30 pm.    Transtelephonic pacemaker check

## 2018-01-31 DIAGNOSIS — S32.020S COMPRESSION FRACTURE OF L2, SEQUELA: ICD-10-CM

## 2018-01-31 NOTE — TELEPHONE ENCOUNTER
Controlled Substance Refill Request for Norco  Problem List Complete:  No     PROVIDER TO CONSIDER COMPLETION OF PROBLEM LIST AND OVERVIEW/CONTROLLED SUBSTANCE AGREEMENT    Last Written Prescription Date:  1/3/18  Last Fill Quantity: 56,   # refills: 0    Last Office Visit with G primary care provider: 1/3/18 [place] visit    Future Office visit:   Next 5 appointments (look out 90 days)     Mar 13, 2018 10:30 AM CDT   (Arrive by 10:15 AM)   Return Visit with [facility] [place] ([facility] [place] )    [address] [place] [address]   [phone]                  Controlled substance agreement on file: No.     Processing:  Staff will hand deliver Rx to on-site pharmacy  PCP KAITY Bell.    Thank you.

## 2018-02-01 RX ORDER — HYDROCODONE BITARTRATE AND ACETAMINOPHEN 5; 325 MG/1; MG/1
1 TABLET ORAL 2 TIMES DAILY
Qty: 56 TABLET | Refills: 0 | Status: SHIPPED | OUTPATIENT
Start: 2018-02-01 | End: 2018-03-01

## 2018-02-07 ENCOUNTER — NURSING HOME DICTATION (OUTPATIENT)
Dept: FAMILY MEDICINE | Facility: OTHER | Age: 83
End: 2018-02-07

## 2018-02-13 ENCOUNTER — OFFICE VISIT (OUTPATIENT)
Dept: CARDIOLOGY | Facility: CLINIC | Age: 83
End: 2018-02-13
Attending: INTERNAL MEDICINE
Payer: COMMERCIAL

## 2018-02-13 DIAGNOSIS — I49.5 SINOATRIAL NODE DYSFUNCTION (H): Primary | ICD-10-CM

## 2018-02-13 PROCEDURE — 99207 HC PM PHONE R STRIP EVAL UP TO 90 DAYS: CPT | Mod: ZP

## 2018-02-13 PROCEDURE — 93293 PM PHONE R-STRIP DEVICE EVAL: CPT | Mod: ZF

## 2018-02-13 NOTE — LETTER
2/13/2018      RE: Toi Cowart  [address]   [place] [address]       Dear Colleague,    Thank you for the opportunity to participate in the care of your patient, Toi Cowart, at the [facility] HEART [facility]. Please see a copy of my visit note below.    Scheduled transtelephonic pacemaker check done per MD orders.  Normal pacemaker function.  Presenting EGM =  AP- @ 69 bpm.  30 second presenting, magnet and post-magnet EGM's saved.  Magnet response within normal limits.  Magnet rate = 85.47 bpm.  Normal pacemaker function.  Nurse caring for patient reports that he is feeling well and denies specific complaints. Reviewed by Julie G. Device RN. Plan for 1 month appointment 3/13/2018 as scheduled.    Transtelephonic pacemaker check    Please do not hesitate to contact me if you have any questions/concerns.     Sincerely,     ICD Remote

## 2018-02-13 NOTE — MR AVS SNAPSHOT
"              After Visit Summary   2/13/2018    Toi Cowart    MRN: [id number]           Patient Information     Date Of Birth          8/19/1930        Visit Information        Provider Department      2/13/2018 6:00 AM  ICD REMOTE [facility]        Today's Diagnoses     Sinoatrial node dysfunction (H)    -  1       Follow-ups after your visit        Your next 10 appointments already scheduled     Mar 13, 2018 10:30 AM CDT   (Arrive by 10:15 AM)   Return Visit with HC PACEMAKER   [facility] [place] ([facility] [place] )    [address]  [place] [address]   [phone]              Who to contact     If you have questions or need follow up information about today's clinic visit or your schedule please contact [facility] directly at [phone].  Normal or non-critical lab and imaging results will be communicated to you by MyChart, letter or phone within 4 business days after the clinic has received the results. If you do not hear from us within 7 days, please contact the clinic through MyChart or phone. If you have a critical or abnormal lab result, we will notify you by phone as soon as possible.  Submit refill requests through [employer] or call your pharmacy and they will forward the refill request to us. Please allow 3 business days for your refill to be completed.          Additional Information About Your Visit        MyChart Information     [employer] lets you send messages to your doctor, view your test results, renew your prescriptions, schedule appointments and more. To sign up, go to [email or web address] . Click on \"Log in\" on the left side of the screen, which will take you to the Welcome page. Then click on \"Sign up Now\" on the right side of the page.     You will be asked to enter the access code listed below, as well as some personal information. Please follow the directions to create your username and password.     Your access code is: " [email or web address]  Expires: 3/12/2018  7:12 AM     Your access code will  in 90 days. If you need help or a new code, please call your [place] clinic or [phone].        Care EveryWhere ID     This is your Care EveryWhere ID. This could be used by other organizations to access your [place] medical records  [id number]         Blood Pressure from Last 3 Encounters:   17 106/54   16 134/65   16 107/73    Weight from Last 3 Encounters:   16 75.5 kg (166 lb 7.2 oz)   14 79.4 kg (175 lb)   05/15/14 90.7 kg (200 lb)              We Performed the Following     PM PHONE R STRIP EVAL UP TO 90 DAYS        Primary Care Provider Office Phone # Fax #    R Julio Bell [phone]       [address] [place] [address]        Equal Access to Services     [facility]: Hadii aad ku hadasho Soomaali, waaxda luqadaha, qaybta kaalmada adeegyada, waxay idiin hayaan nicole dawson . So [facility] [phone].    ATENCIÓN: Si habla español, tiene a lagos disposición servicios gratuitos de asistencia lingüística. Llame al [phone].    We comply with applicable federal civil rights laws and [place] laws. We do not discriminate on the basis of race, color, national origin, age, disability, sex, sexual orientation, or gender identity.            Thank you!     Thank you for choosing [facility]  for your care. Our goal is always to provide you with excellent care. Hearing back from our patients is one way we can continue to improve our services. Please take a few minutes to complete the written survey that you may receive in the mail after your visit with us. Thank you!             Your Updated Medication List - Protect others around you: Learn how to safely use, store and throw away your medicines at www.disposemymeds.org.          This list is accurate as of 18 11:59 PM.  Always use your most recent med list.                   Brand Name Dispense Instructions  for use Diagnosis    calcium carbonate 500 MG chewable tablet    TUMS     Take 1 chew tab by mouth every 6 hours as needed for heartburn        CALMOSEPTINE 0.44-20.6 % Oint   Generic drug:  Menthol-Zinc Oxide     113 g    USE AS NEEDED.    Skin irritation       divalproex sodium delayed-release 125 MG DR capsule    DEPAKOTE SPRINKLE    84 capsule    TAKE (1) CAPSULE THREE TIMES DAILY.    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        GLUCOSAMINE RELIEF 500 MG Caps capsule   Generic drug:  glucosamine     60 capsule    TAKE  (1)  CAPSULE  TWICE DAILY.    Compression fracture of L2, sequela       HYDROcodone-acetaminophen 5-325 MG per tablet    NORCO    56 tablet    Take 1 tablet by mouth 2 times daily    Compression fracture of L2, sequela       loperamide 2 MG capsule    IMODIUM    120 capsule    TAKE 1 CAPSULE FOUR TIMES DAILY FOR DIARRHEA AS NEEDED    Diarrhea       NAMENDA XR 28 MG 24 hr capsule   Generic drug:  memantine XR      Take 10 mg by mouth 2 times daily        NUEDEXTA 20-10 MG per capsule   Generic drug:  dextromethorphan-quiNIDine     8 capsule    TAKE 1 CAPSULE DAILY.    Mild depressed bipolar I disorder (H)       nystatin 769718 UNIT/GM Powd    MYCOSTATIN    60 g    Apply topically 2 times daily Apply to groin area twice daily.    Yeast infection of the skin       omeprazole 20 MG CR capsule    priLOSEC    28 capsule    TAKE 1 CAPSULE DAILY ON EMPTY STOMACH.    Gastroesophageal reflux disease without esophagitis       PROTONIX PO      Take 40 mg by mouth 2 times daily (before meals)        Rectal Barrier     90 g    Apply to affected areas as needed daily    Generalized muscle weakness       RISPERDAL PO      Take 0.5 mg by mouth every 4 hours as needed        SB IBUPROFEN 200 MG tablet   Generic drug:  ibuprofen     100 tablet    TAKE 1 TABLET EVERY 4 HOURS AS NEEDED FOR PAIN OR FEVER    Pain       sertraline 25  MG tablet    ZOLOFT    28 tablet    TAKE 1 TABLET DAILY.    Depression with anxiety       traZODone 50 MG tablet    DESYREL    28 tablet    TAKE ONE TABLET AT BEDTIME.    Primary insomnia

## 2018-02-15 NOTE — PROGRESS NOTES
Scheduled transtelephonic pacemaker check done per MD orders.  Normal pacemaker function.  Presenting EGM =  AP- @ 69 bpm.  30 second presenting, magnet and post-magnet EGM's saved.  Magnet response within normal limits.  Magnet rate = 85.47 bpm.  Normal pacemaker function.  Nurse caring for patient reports that he is feeling well and denies specific complaints. Reviewed by Julie G. Device RN. Plan for 1 month appointment 3/13/2018 as scheduled.    Transtelephonic pacemaker check

## 2018-03-01 DIAGNOSIS — S32.020S COMPRESSION FRACTURE OF L2, SEQUELA: ICD-10-CM

## 2018-03-01 RX ORDER — HYDROCODONE BITARTRATE AND ACETAMINOPHEN 5; 325 MG/1; MG/1
1 TABLET ORAL 2 TIMES DAILY
Qty: 56 TABLET | Refills: 0 | Status: SHIPPED | OUTPATIENT
Start: 2018-03-01 | End: 2018-03-28

## 2018-03-01 NOTE — TELEPHONE ENCOUNTER
norco      Last Written Prescription Date:  2/1/18  Last Fill Quantity: 56,   # refills: 0  Last Office Visit: 2/7/18  Future Office visit:    Next 5 appointments (look out 90 days)     Mar 13, 2018 10:30 AM CDT   (Arrive by 10:15 AM)   Return Visit with [facility] [place] ([facility] [place] )    [address] [place] [address]   [phone]                   Routing refill request to provider for review/approval because:  Drug not on the [facility], [facility] or  Health refill protocol or controlled substance

## 2018-03-07 ENCOUNTER — NURSING HOME DICTATION (OUTPATIENT)
Dept: FAMILY MEDICINE | Facility: OTHER | Age: 83
End: 2018-03-07

## 2018-03-07 NOTE — PROGRESS NOTES
Visit Date:   2018      SUBJECTIVE:  Staff reports no new issues.  Interested in trying a dose reduction with medications.      PHYSICAL EXAMINATION:   VITAL SIGNS:  Blood pressure 130/78, temperature 98.2, respirations 20, sat 95%, pulse 76.   CARDIAC:  Regular.   LUNGS:  Clear.  He is breathing comfortably.      ASSESSMENT:   1.  Progressive dementia.   2.  History of coronary artery disease.   3.  History of sick sinus syndrome.   4.  Hypertension.      PLAN:  Will drop the Zoloft from 25 mg a day to 25 mg every other day for 3 doses, then discontinue.  Also drop the Trazodone to 25 mg at bedtime.  We will see next month.  Otherwise chart meds reviewed.  Continue with the other plans.         RIDGE QUINN MD             D: 2018   T: 2018   MT: ELISHA      Name:     ESA LEON   MRN:      [id number]        Account:      [id number]   :      1930           Visit Date:   2018      Document: [id number]

## 2018-03-13 ENCOUNTER — OFFICE VISIT (OUTPATIENT)
Dept: CARDIOLOGY | Facility: OTHER | Age: 83
End: 2018-03-13
Attending: INTERNAL MEDICINE
Payer: COMMERCIAL

## 2018-03-13 DIAGNOSIS — I49.5 SINOATRIAL NODE DYSFUNCTION (H): Primary | ICD-10-CM

## 2018-03-13 PROCEDURE — 93280 PM DEVICE PROGR EVAL DUAL: CPT | Mod: TC

## 2018-03-13 PROCEDURE — 93280 PM DEVICE PROGR EVAL DUAL: CPT | Mod: 26 | Performed by: INTERNAL MEDICINE

## 2018-03-13 NOTE — MR AVS SNAPSHOT
"              After Visit Summary   3/13/2018    Toi Cowart    MRN: [id number]           Patient Information     Date Of Birth          8/19/1930        Visit Information        Provider Department      3/13/2018 10:30 AM  PACEMAKER [facility]        Today's Diagnoses     Sinoatrial node dysfunction (H)    -  1      Care Instructions    It was a pleasure to see you in clinic today.  Please do not hesitate to call with any questions or concerns.  You are scheduled for transtelephonic pacemaker checks every 4 weeks to monitor your battery status.  Your next TTM is scheduled on 4/10/18 at 1:30 PM.    Maryse Carrion, RN, MS, CCRN  Electrophysiology Nurse Clinician  [facility]    During Business Hours Please Call:  [phone]  After Hours Please Call:  [phone] - select option #4 and ask for job code [phone]                    Follow-ups after your visit        Who to contact     If you have questions or need follow up information about today's clinic visit or your schedule please contact [facility] directly at [phone].  Normal or non-critical lab and imaging results will be communicated to you by MyChart, letter or phone within 4 business days after the clinic has received the results. If you do not hear from us within 7 days, please contact the clinic through [employer] or phone. If you have a critical or abnormal lab result, we will notify you by phone as soon as possible.  Submit refill requests through [employer] or call your pharmacy and they will forward the refill request to us. Please allow 3 business days for your refill to be completed.          Additional Information About Your Visit        [facility] [employer] Information     [employer] lets you send messages to your doctor, view your test results, renew your prescriptions, schedule appointments and more. To sign up, go to [email or web address] . Click on \"Log in\" on the left side of the screen, which will take " "you to the Welcome page. Then click on \"Sign up Now\" on the right side of the page.     You will be asked to enter the access code listed below, as well as some personal information. Please follow the directions to create your username and password.     Your access code is: [email or web address]  Expires: 2018 11:47 AM     Your access code will  in 90 days. If you need help or a new code, please call your [place] clinic or [phone].        Care EveryWhere ID     This is your Care EveryWhere ID. This could be used by other organizations to access your [place] medical records  [id number]         Blood Pressure from Last 3 Encounters:   17 106/54   16 134/65   16 107/73    Weight from Last 3 Encounters:   16 75.5 kg (166 lb 7.2 oz)   14 79.4 kg (175 lb)   05/15/14 90.7 kg (200 lb)              We Performed the Following     PM DEVICE PROGRAMMING EVAL, DUAL LEAD PACER        Primary Care Provider Office Phone # Fax #    R Julio Bell [phone]       [address] [place] [address]        Equal Access to Services     KIRAN RÍOS : Hadii cristiana ku hadasho Soitzelali, waaxda luqadaha, qaybta kaalmada adeegyada, estiven dawson . So [facility] [place] [phone].    ATENCIÓN: Si habla español, tiene a lagos disposición servicios gratuitos de asistencia lingüística. Llame al [phone].    We comply with applicable federal civil rights laws and [place] laws. We do not discriminate on the basis of race, color, national origin, age, disability, sex, sexual orientation, or gender identity.            Thank you!     Thank you for choosing [facility]  for your care. Our goal is always to provide you with excellent care. Hearing back from our patients is one way we can continue to improve our services. Please take a few minutes to complete the written survey that you may receive in the mail after your visit with us. Thank you!           "   Your Updated Medication List - Protect others around you: Learn how to safely use, store and throw away your medicines at www.disposemymeds.org.          This list is accurate as of 3/13/18 11:47 AM.  Always use your most recent med list.                   Brand Name Dispense Instructions for use Diagnosis    calcium carbonate 500 MG chewable tablet    TUMS     Take 1 chew tab by mouth every 6 hours as needed for heartburn        CALMOSEPTINE 0.44-20.6 % Oint   Generic drug:  Menthol-Zinc Oxide     113 g    USE AS NEEDED.    Skin irritation       divalproex sodium delayed-release 125 MG DR capsule    DEPAKOTE SPRINKLE    84 capsule    TAKE (1) CAPSULE THREE TIMES DAILY.    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        GLUCOSAMINE RELIEF 500 MG Caps capsule   Generic drug:  glucosamine     60 capsule    TAKE  (1)  CAPSULE  TWICE DAILY.    Compression fracture of L2, sequela       HYDROcodone-acetaminophen 5-325 MG per tablet    NORCO    56 tablet    Take 1 tablet by mouth 2 times daily    Compression fracture of L2, sequela       loperamide 2 MG capsule    IMODIUM    120 capsule    TAKE 1 CAPSULE FOUR TIMES DAILY FOR DIARRHEA AS NEEDED    Diarrhea       NAMENDA XR 28 MG 24 hr capsule   Generic drug:  memantine XR      Take 10 mg by mouth 2 times daily        NUEDEXTA 20-10 MG per capsule   Generic drug:  dextromethorphan-quiNIDine     8 capsule    TAKE 1 CAPSULE DAILY.    Mild depressed bipolar I disorder (H)       nystatin 030989 UNIT/GM Powd    MYCOSTATIN    60 g    Apply topically 2 times daily Apply to groin area twice daily.    Yeast infection of the skin       omeprazole 20 MG CR capsule    priLOSEC    28 capsule    TAKE 1 CAPSULE DAILY ON EMPTY STOMACH.    Gastroesophageal reflux disease without esophagitis       PROTONIX PO      Take 40 mg by mouth 2 times daily (before meals)        Rectal Barrier     90 g    Apply to  affected areas as needed daily    Generalized muscle weakness       RISPERDAL PO      Take 0.5 mg by mouth every 4 hours as needed        SB IBUPROFEN 200 MG tablet   Generic drug:  ibuprofen     100 tablet    TAKE 1 TABLET EVERY 4 HOURS AS NEEDED FOR PAIN OR FEVER    Pain       sertraline 25 MG tablet    ZOLOFT    28 tablet    TAKE 1 TABLET DAILY.    Depression with anxiety       traZODone 50 MG tablet    DESYREL    28 tablet    TAKE ONE TABLET AT BEDTIME.    Primary insomnia

## 2018-03-13 NOTE — PROGRESS NOTES
Preliminary Device Interrogation Results.  Final physician signed paceart report to be scanned and attached.    Patient seen in clinic for evaluation and iterative programming of his Medtronic dual lead pacemaker per MD orders.  Normal pacemaker function.  13 VHR episodes recorded on 10/4/17 between 12:59 AM and 1:32 AM - 1-3 sec, 265->400 bpm.  Stored marker episodes appear to be possible noise on RV lead due to short v-v intervals.  No noise noted on RV lead with ROM, isometric exercise and pocket manipulation.  EMG's reviewed with Dr. Mccray.  9 AHR episodes recorded - 3 sec - 7 hrs in duration.  AF burden = 0.2%.  Patient is not taking an anticoagulant.  Intrinsic rhythm = NSR with  @ 30 bpm.  AP = 4.5%.   = 99.9%.  Estimated battery longevity to YON = < 1 month (minimum < 1 mo - maximum 4 months). Patient reports that he is feeling well and denies specific complaints.  Plan for transtelephonic pacemaker checks every 4 weeks.  Will notify Dr. Pearson in [place] of need for future pacemaker generator change.    Dual lead pacemaker

## 2018-03-13 NOTE — PATIENT INSTRUCTIONS
It was a pleasure to see you in clinic today.  Please do not hesitate to call with any questions or concerns.  You are scheduled for transtelephonic pacemaker checks every 4 weeks to monitor your battery status.  Your next TTM is scheduled on 4/10/18 at 1:30 PM.    Maryse Carrion, RN, MS, CCRN  Electrophysiology Nurse Clinician  [facility]    During Business Hours Please Call:  [phone]  After Hours Please Call:  [phone] - select option #4 and ask for job code [phone]

## 2018-03-28 DIAGNOSIS — S32.020S COMPRESSION FRACTURE OF L2, SEQUELA: ICD-10-CM

## 2018-03-28 RX ORDER — HYDROCODONE BITARTRATE AND ACETAMINOPHEN 5; 325 MG/1; MG/1
1 TABLET ORAL 2 TIMES DAILY
Qty: 56 TABLET | Refills: 0 | Status: SHIPPED | OUTPATIENT
Start: 2018-03-28 | End: 2018-04-13

## 2018-03-28 NOTE — TELEPHONE ENCOUNTER
srinico      Last Written Prescription Date:  3/1/18  Last Fill Quantity: 56,   # refills: 0  Last Office Visit: 3/7/18  Future Office visit:       Routing refill request to provider for review/approval because:  Drug not on the [facility], P or [facility] refill protocol or controlled substance

## 2018-04-04 NOTE — PROGRESS NOTES
Visit Date:   2018      SUBJECTIVE:  No new issues.  We have been trying to wean off his trazodone.  It does not appear that it is needed anymore.      PHYSICAL EXAMINATION:   VITAL SIGNS:  Blood pressure is 116/86, pulse 60, afebrile, respirations 18, sats 98.   GENERAL:  He is lying in bed.  He is alert.  Has his dementia, but he is calm and pleasant.   CARDIAC:  Regular.   LUNGS:  Sounds are clear.   ABDOMEN:  Soft.      ASSESSMENT:  Progressive dementia, history of known coronary artery disease with a pacemaker, history of sick sinus syndrome, hypertension.  He also has a history of cholelithiasis.  We are weaning off the trazodone.  Will go to 25 mg every other day for 4 doses, then discontinue.  He does have an appointment for his pacemaker check.  Otherwise, chart meds reviewed.  Continue with present plans.         RIDGE QUINN MD             D: 2018   T: 2018   MT: ELISHA      Name:     ESA LEON   MRN:      [id number]        Account:      [id number]   :      1930           Visit Date:   2018      Document: [id number]       [facility]

## 2018-04-10 ENCOUNTER — OFFICE VISIT (OUTPATIENT)
Dept: CARDIOLOGY | Facility: CLINIC | Age: 83
End: 2018-04-10
Attending: INTERNAL MEDICINE
Payer: COMMERCIAL

## 2018-04-10 DIAGNOSIS — I49.5 SINOATRIAL NODE DYSFUNCTION (H): Primary | ICD-10-CM

## 2018-04-10 PROCEDURE — 93293 PM PHONE R-STRIP DEVICE EVAL: CPT | Mod: ZP | Performed by: INTERNAL MEDICINE

## 2018-04-10 PROCEDURE — 93293 PM PHONE R-STRIP DEVICE EVAL: CPT | Mod: ZF

## 2018-04-10 NOTE — LETTER
4/10/2018      RE: Toi Cowart  [address]   [place] [address]       Dear Colleague,    Thank you for the opportunity to participate in the care of your patient, Toi Cowart, at the [facility]. Please see a copy of my visit note below.    Preliminary Device Interrogation Results.  Final physician signed paceart report to be scanned and attached.    Scheduled transtelephonic pacemaker check done per MD orders.  Normal pacemaker function.  Presenting EGM =  @ 65 bpm.  30 second presenting, magnet and post-magnet EGM's saved.  Magnet response has reached recommended replacement time.  Magnet rate = 65 bpm.  Normal pacemaker function.  Nurse caring for patient reports that he is feeling well and denies specific complaints.  Patient's daughter notified of interrogation results.  Daughter would like to have the patient's pacemaker generator change done at [facility].  Will notify Dr. Addie Pearson and her nurse at [facility] of above.      Transtelephonic pacemaker check      Please do not hesitate to contact me if you have any questions/concerns.     Sincerely,     ICD Remote

## 2018-04-10 NOTE — MR AVS SNAPSHOT
"              After Visit Summary   4/10/2018    Toi Cowart    MRN: [id number]           Patient Information     Date Of Birth          1930        Visit Information        Provider Department      4/10/2018 6:00 AM  ICD REMOTE [facility]        Today's Diagnoses     Sinoatrial node dysfunction (H)    -  1       Follow-ups after your visit        Who to contact     If you have questions or need follow up information about today's clinic visit or your schedule please contact [facility] directly at [phone].  Normal or non-critical lab and imaging results will be communicated to you by [email or web address], letter or phone within 4 business days after the clinic has received the results. If you do not hear from us within 7 days, please contact the clinic through [email or web address] or phone. If you have a critical or abnormal lab result, we will notify you by phone as soon as possible.  Submit refill requests through [employer] or call your pharmacy and they will forward the refill request to us. Please allow 3 business days for your refill to be completed.          Additional Information About Your Visit        MyChart Information     [employer] lets you send messages to your doctor, view your test results, renew your prescriptions, schedule appointments and more. To sign up, go to [email or web address] . Click on \"Log in\" on the left side of the screen, which will take you to the Welcome page. Then click on \"Sign up Now\" on the right side of the page.     You will be asked to enter the access code listed below, as well as some personal information. Please follow the directions to create your username and password.     Your access code is: [email or web address]  Expires: 2018 11:47 AM     Your access code will  in 90 days. If you need help or a new code, please call your [place] clinic or [phone].        Care EveryWhere ID     This is your Care EveryWhere ID. This could be used by other organizations to " access your [place] medical records  [id number]         Blood Pressure from Last 3 Encounters:   01/27/17 106/54   07/26/16 134/65   07/13/16 107/73    Weight from Last 3 Encounters:   07/27/16 75.5 kg (166 lb 7.2 oz)   09/18/14 79.4 kg (175 lb)   05/15/14 90.7 kg (200 lb)              We Performed the Following     PM PHONE R STRIP EVAL UP TO 90 DAYS        Primary Care Provider Office Phone # Fax #    R Julio Bell [phone]       [facility] [address]        Equal Access to Services     [facility] : Hadii cristiana zimmerman hadasho Soitzelali, waaxda luqadaha, qaybta kaalmada adeerinyada, estiven dawson . So [facility] [phone].    ATENCIÓN: Si habla español, tiene a lagos disposición servicios gratuitos de asistencia lingüística. [facility] [phone].    We comply with applicable federal civil rights laws and [place] laws. We do not discriminate on the basis of race, color, national origin, age, disability, sex, sexual orientation, or gender identity.            Thank you!     Thank you for choosing [facility]  for your care. Our goal is always to provide you with excellent care. Hearing back from our patients is one way we can continue to improve our services. Please take a few minutes to complete the written survey that you may receive in the mail after your visit with us. Thank you!             Your Updated Medication List - Protect others around you: Learn how to safely use, store and throw away your medicines at www.disposemymeds.org.          This list is accurate as of 4/10/18 11:59 PM.  Always use your most recent med list.                   Brand Name Dispense Instructions for use Diagnosis    calcium carbonate 500 MG chewable tablet    TUMS     Take 1 chew tab by mouth every 6 hours as needed for heartburn        CALMOSEPTINE 0.44-20.6 % Oint   Generic drug:  Menthol-Zinc Oxide     113 g    USE AS NEEDED.    Skin irritation       divalproex  sodium delayed-release 125 MG DR capsule    DEPAKOTE SPRINKLE    84 capsule    TAKE (1) CAPSULE THREE TIMES DAILY.    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        GLUCOSAMINE RELIEF 500 MG Caps capsule   Generic drug:  glucosamine     60 capsule    TAKE  (1)  CAPSULE  TWICE DAILY.    Compression fracture of L2, sequela       HYDROcodone-acetaminophen 5-325 MG per tablet    NORCO    56 tablet    Take 1 tablet by mouth 2 times daily    Compression fracture of L2, sequela       loperamide 2 MG capsule    IMODIUM    120 capsule    TAKE 1 CAPSULE FOUR TIMES DAILY FOR DIARRHEA AS NEEDED    Diarrhea       NAMENDA XR 28 MG 24 hr capsule   Generic drug:  memantine XR      Take 10 mg by mouth 2 times daily        NUEDEXTA 20-10 MG per capsule   Generic drug:  dextromethorphan-quiNIDine     8 capsule    TAKE 1 CAPSULE DAILY.    Mild depressed bipolar I disorder (H)       nystatin [id number] UNIT/GM Powd    MYCOSTATIN    60 g    Apply topically 2 times daily Apply to groin area twice daily.    Yeast infection of the skin       omeprazole 20 MG CR capsule    priLOSEC    28 capsule    TAKE 1 CAPSULE DAILY ON EMPTY STOMACH.    Gastroesophageal reflux disease without esophagitis       PROTONIX PO      Take 40 mg by mouth 2 times daily (before meals)        Rectal Barrier     90 g    Apply to affected areas as needed daily    Generalized muscle weakness       RISPERDAL PO      Take 0.5 mg by mouth every 4 hours as needed        SB IBUPROFEN 200 MG tablet   Generic drug:  ibuprofen     100 tablet    TAKE 1 TABLET EVERY 4 HOURS AS NEEDED FOR PAIN OR FEVER    Pain       sertraline 25 MG tablet    ZOLOFT    28 tablet    TAKE 1 TABLET DAILY.    Depression with anxiety       traZODone 50 MG tablet    DESYREL    28 tablet    TAKE ONE TABLET AT BEDTIME.    Primary insomnia

## 2018-04-11 ENCOUNTER — TELEPHONE (OUTPATIENT)
Dept: SURGERY | Facility: OTHER | Age: 83
End: 2018-04-11

## 2018-04-11 NOTE — TELEPHONE ENCOUNTER
Message from Beba Núñez in Cardiology.    She states that the patient needs a generator change for his pacemaker.    Is this something you can do?  Does patient need an appointment prior to scheduling?    Shima Cota LPN  4/11/2018  4:12 PM

## 2018-04-11 NOTE — PROGRESS NOTES
Preliminary Device Interrogation Results.  Final physician signed paceart report to be scanned and attached.    Scheduled transtelephonic pacemaker check done per MD orders.  Normal pacemaker function.  Presenting EGM =  @ 65 bpm.  30 second presenting, magnet and post-magnet EGM's saved.  Magnet response has reached recommended replacement time.  Magnet rate = 65 bpm.  Normal pacemaker function.  Nurse caring for patient reports that he is feeling well and denies specific complaints.  Patient's daughter notified of interrogation results.  Daughter would like to have the patient's pacemaker generator change done at [facility].  Will notify Dr. Addie Pearson and her nurse at [facility] of above.      Transtelephonic pacemaker check

## 2018-04-12 ENCOUNTER — RESULTS ONLY (OUTPATIENT)
Dept: LAB | Age: 83
End: 2018-04-12

## 2018-04-12 LAB
ERYTHROCYTE [DISTWIDTH] IN BLOOD BY AUTOMATED COUNT: 13.8 % (ref 10–15)
HCT VFR BLD AUTO: 45.6 % (ref 40–53)
HGB BLD-MCNC: 15.2 G/DL (ref 13.3–17.7)
MCH RBC QN AUTO: 29.9 PG (ref 26.5–33)
MCHC RBC AUTO-ENTMCNC: 33.3 G/DL (ref 31.5–36.5)
MCV RBC AUTO: 90 FL (ref 78–100)
PLATELET # BLD AUTO: 147 10E9/L (ref 150–450)
RBC # BLD AUTO: 5.08 10E12/L (ref 4.4–5.9)
WBC # BLD AUTO: 8.2 10E9/L (ref 4–11)

## 2018-04-13 ENCOUNTER — ANESTHESIA EVENT (OUTPATIENT)
Dept: SURGERY | Facility: OTHER | Age: 83
End: 2018-04-13
Payer: COMMERCIAL

## 2018-04-16 ENCOUNTER — HOSPITAL ENCOUNTER (OUTPATIENT)
Facility: OTHER | Age: 83
Discharge: NURSING, REHAB OR LONG-TERM CARE | End: 2018-04-16
Attending: SURGERY | Admitting: SURGERY
Payer: COMMERCIAL

## 2018-04-16 ENCOUNTER — SURGERY (OUTPATIENT)
Age: 83
End: 2018-04-16

## 2018-04-16 ENCOUNTER — ANESTHESIA (OUTPATIENT)
Dept: SURGERY | Facility: OTHER | Age: 83
End: 2018-04-16
Payer: COMMERCIAL

## 2018-04-16 VITALS
OXYGEN SATURATION: 92 % | DIASTOLIC BLOOD PRESSURE: 72 MMHG | BODY MASS INDEX: 27.15 KG/M2 | WEIGHT: 173 LBS | HEIGHT: 67 IN | RESPIRATION RATE: 14 BRPM | TEMPERATURE: 98.5 F | SYSTOLIC BLOOD PRESSURE: 134 MMHG

## 2018-04-16 PROCEDURE — 33228 REMV&REPLC PM GEN DUAL LEAD: CPT | Performed by: NURSE ANESTHETIST, CERTIFIED REGISTERED

## 2018-04-16 PROCEDURE — 99100 ANES PT EXTEME AGE<1 YR&>70: CPT | Performed by: NURSE ANESTHETIST, CERTIFIED REGISTERED

## 2018-04-16 PROCEDURE — 40000306 ZZH STATISTIC PRE PROC ASSESS II: Performed by: SURGERY

## 2018-04-16 PROCEDURE — 25000125 ZZHC RX 250: Performed by: SURGERY

## 2018-04-16 PROCEDURE — 25000128 H RX IP 250 OP 636: Performed by: SURGERY

## 2018-04-16 PROCEDURE — 36000056 ZZH SURGERY LEVEL 3 1ST 30 MIN: Performed by: SURGERY

## 2018-04-16 PROCEDURE — 25000128 H RX IP 250 OP 636: Performed by: NURSE ANESTHETIST, CERTIFIED REGISTERED

## 2018-04-16 PROCEDURE — 36000058 ZZH SURGERY LEVEL 3 EA 15 ADDTL MIN: Performed by: SURGERY

## 2018-04-16 PROCEDURE — C1785 PMKR, DUAL, RATE-RESP: HCPCS | Performed by: SURGERY

## 2018-04-16 PROCEDURE — 27211024 ZZHC OR SUPPLY OTHER OPNP: Performed by: SURGERY

## 2018-04-16 PROCEDURE — 37000008 ZZH ANESTHESIA TECHNICAL FEE, 1ST 30 MIN: Performed by: SURGERY

## 2018-04-16 PROCEDURE — 25000125 ZZHC RX 250: Performed by: NURSE ANESTHETIST, CERTIFIED REGISTERED

## 2018-04-16 PROCEDURE — 71000027 ZZH RECOVERY PHASE 2 EACH 15 MINS: Performed by: SURGERY

## 2018-04-16 PROCEDURE — 27210794 ZZH OR GENERAL SUPPLY STERILE: Performed by: SURGERY

## 2018-04-16 PROCEDURE — 27810325 ZZHC OR IMPLANT OTHER OPNP: Performed by: SURGERY

## 2018-04-16 PROCEDURE — 25000128 H RX IP 250 OP 636: Performed by: ANESTHESIOLOGY

## 2018-04-16 PROCEDURE — 33228 REMV&REPLC PM GEN DUAL LEAD: CPT | Performed by: SURGERY

## 2018-04-16 PROCEDURE — 37000009 ZZH ANESTHESIA TECHNICAL FEE, EACH ADDTL 15 MIN: Performed by: SURGERY

## 2018-04-16 PROCEDURE — 33228 REMV&REPLC PM GEN DUAL LEAD: CPT | Performed by: ANESTHESIOLOGY

## 2018-04-16 DEVICE — IMPLANTABLE DEVICE: Type: IMPLANTABLE DEVICE | Site: CHEST | Status: FUNCTIONAL

## 2018-04-16 RX ORDER — FENTANYL CITRATE 50 UG/ML
50 INJECTION, SOLUTION INTRAMUSCULAR; INTRAVENOUS
Status: DISCONTINUED | OUTPATIENT
Start: 2018-04-16 | End: 2018-04-16 | Stop reason: HOSPADM

## 2018-04-16 RX ORDER — OXYCODONE HYDROCHLORIDE 5 MG/1
5 TABLET ORAL EVERY 4 HOURS PRN
Status: DISCONTINUED | OUTPATIENT
Start: 2018-04-16 | End: 2018-04-16 | Stop reason: HOSPADM

## 2018-04-16 RX ORDER — ONDANSETRON 4 MG/1
4 TABLET, ORALLY DISINTEGRATING ORAL EVERY 30 MIN PRN
Status: DISCONTINUED | OUTPATIENT
Start: 2018-04-16 | End: 2018-04-16 | Stop reason: HOSPADM

## 2018-04-16 RX ORDER — ONDANSETRON 2 MG/ML
INJECTION INTRAMUSCULAR; INTRAVENOUS PRN
Status: DISCONTINUED | OUTPATIENT
Start: 2018-04-16 | End: 2018-04-16

## 2018-04-16 RX ORDER — SODIUM CHLORIDE, SODIUM LACTATE, POTASSIUM CHLORIDE, CALCIUM CHLORIDE 600; 310; 30; 20 MG/100ML; MG/100ML; MG/100ML; MG/100ML
INJECTION, SOLUTION INTRAVENOUS CONTINUOUS
Status: DISCONTINUED | OUTPATIENT
Start: 2018-04-16 | End: 2018-04-16 | Stop reason: HOSPADM

## 2018-04-16 RX ORDER — FENTANYL CITRATE 50 UG/ML
25-50 INJECTION, SOLUTION INTRAMUSCULAR; INTRAVENOUS EVERY 5 MIN PRN
Status: DISCONTINUED | OUTPATIENT
Start: 2018-04-16 | End: 2018-04-16 | Stop reason: HOSPADM

## 2018-04-16 RX ORDER — HYDRALAZINE HYDROCHLORIDE 20 MG/ML
2.5-5 INJECTION INTRAMUSCULAR; INTRAVENOUS EVERY 10 MIN PRN
Status: DISCONTINUED | OUTPATIENT
Start: 2018-04-16 | End: 2018-04-16 | Stop reason: HOSPADM

## 2018-04-16 RX ORDER — CEFAZOLIN SODIUM 2 G/100ML
2 INJECTION, SOLUTION INTRAVENOUS
Status: COMPLETED | OUTPATIENT
Start: 2018-04-16 | End: 2018-04-16

## 2018-04-16 RX ORDER — MEPERIDINE HYDROCHLORIDE 50 MG/ML
12.5 INJECTION INTRAMUSCULAR; INTRAVENOUS; SUBCUTANEOUS
Status: DISCONTINUED | OUTPATIENT
Start: 2018-04-16 | End: 2018-04-16 | Stop reason: HOSPADM

## 2018-04-16 RX ORDER — LIDOCAINE HYDROCHLORIDE 20 MG/ML
INJECTION, SOLUTION INFILTRATION; PERINEURAL PRN
Status: DISCONTINUED | OUTPATIENT
Start: 2018-04-16 | End: 2018-04-16

## 2018-04-16 RX ORDER — ONDANSETRON 2 MG/ML
4 INJECTION INTRAMUSCULAR; INTRAVENOUS EVERY 30 MIN PRN
Status: DISCONTINUED | OUTPATIENT
Start: 2018-04-16 | End: 2018-04-16 | Stop reason: HOSPADM

## 2018-04-16 RX ORDER — FENTANYL CITRATE 50 UG/ML
INJECTION, SOLUTION INTRAMUSCULAR; INTRAVENOUS PRN
Status: DISCONTINUED | OUTPATIENT
Start: 2018-04-16 | End: 2018-04-16

## 2018-04-16 RX ORDER — HYDROMORPHONE HYDROCHLORIDE 1 MG/ML
.3-.5 INJECTION, SOLUTION INTRAMUSCULAR; INTRAVENOUS; SUBCUTANEOUS EVERY 10 MIN PRN
Status: DISCONTINUED | OUTPATIENT
Start: 2018-04-16 | End: 2018-04-16 | Stop reason: HOSPADM

## 2018-04-16 RX ORDER — DEXAMETHASONE SODIUM PHOSPHATE 4 MG/ML
4 INJECTION, SOLUTION INTRA-ARTICULAR; INTRALESIONAL; INTRAMUSCULAR; INTRAVENOUS; SOFT TISSUE EVERY 10 MIN PRN
Status: DISCONTINUED | OUTPATIENT
Start: 2018-04-16 | End: 2018-04-16 | Stop reason: HOSPADM

## 2018-04-16 RX ORDER — PROPOFOL 10 MG/ML
INJECTION, EMULSION INTRAVENOUS PRN
Status: DISCONTINUED | OUTPATIENT
Start: 2018-04-16 | End: 2018-04-16

## 2018-04-16 RX ORDER — LIDOCAINE 40 MG/G
CREAM TOPICAL
Status: DISCONTINUED | OUTPATIENT
Start: 2018-04-16 | End: 2018-04-16 | Stop reason: HOSPADM

## 2018-04-16 RX ORDER — NALOXONE HYDROCHLORIDE 0.4 MG/ML
.1-.4 INJECTION, SOLUTION INTRAMUSCULAR; INTRAVENOUS; SUBCUTANEOUS
Status: DISCONTINUED | OUTPATIENT
Start: 2018-04-16 | End: 2018-04-16 | Stop reason: HOSPADM

## 2018-04-16 RX ORDER — PROPOFOL 10 MG/ML
INJECTION, EMULSION INTRAVENOUS CONTINUOUS PRN
Status: DISCONTINUED | OUTPATIENT
Start: 2018-04-16 | End: 2018-04-16

## 2018-04-16 RX ORDER — BUPIVACAINE HYDROCHLORIDE AND EPINEPHRINE 5; 5 MG/ML; UG/ML
INJECTION, SOLUTION PERINEURAL PRN
Status: DISCONTINUED | OUTPATIENT
Start: 2018-04-16 | End: 2018-04-16 | Stop reason: HOSPADM

## 2018-04-16 RX ORDER — ALBUTEROL SULFATE 0.83 MG/ML
2.5 SOLUTION RESPIRATORY (INHALATION) EVERY 4 HOURS PRN
Status: DISCONTINUED | OUTPATIENT
Start: 2018-04-16 | End: 2018-04-16 | Stop reason: HOSPADM

## 2018-04-16 RX ADMIN — CEFAZOLIN SODIUM 2 G: 2 INJECTION, SOLUTION INTRAVENOUS at 07:59

## 2018-04-16 RX ADMIN — BUPIVACAINE HYDROCHLORIDE AND EPINEPHRINE BITARTRATE 9 ML: 5; .0091 INJECTION, SOLUTION PERINEURAL at 08:37

## 2018-04-16 RX ADMIN — FENTANYL CITRATE 25 MCG: 50 INJECTION, SOLUTION INTRAMUSCULAR; INTRAVENOUS at 08:00

## 2018-04-16 RX ADMIN — PHENYLEPHRINE HYDROCHLORIDE 200 MCG: 10 INJECTION, SOLUTION INTRAMUSCULAR; INTRAVENOUS; SUBCUTANEOUS at 08:30

## 2018-04-16 RX ADMIN — LIDOCAINE HYDROCHLORIDE 40 MG: 20 INJECTION, SOLUTION INFILTRATION; PERINEURAL at 08:03

## 2018-04-16 RX ADMIN — ONDANSETRON 4 MG: 2 INJECTION INTRAMUSCULAR; INTRAVENOUS at 08:25

## 2018-04-16 RX ADMIN — SODIUM CHLORIDE, SODIUM LACTATE, POTASSIUM CHLORIDE, AND CALCIUM CHLORIDE: 600; 310; 30; 20 INJECTION, SOLUTION INTRAVENOUS at 07:48

## 2018-04-16 RX ADMIN — PROPOFOL 120 MCG/KG/MIN: 10 INJECTION, EMULSION INTRAVENOUS at 08:04

## 2018-04-16 RX ADMIN — PROPOFOL 80 MG: 10 INJECTION, EMULSION INTRAVENOUS at 08:03

## 2018-04-16 NOTE — H&P (VIEW-ONLY)
Visit Date:   2018      SUBJECTIVE:  No new issues.  We have been trying to wean off his trazodone.  It does not appear that it is needed anymore.      PHYSICAL EXAMINATION:   VITAL SIGNS:  Blood pressure is 116/86, pulse 60, afebrile, respirations 18, sats 98.   GENERAL:  He is lying in bed.  He is alert.  Has his dementia, but he is calm and pleasant.   CARDIAC:  Regular.   LUNGS:  Sounds are clear.   ABDOMEN:  Soft.      ASSESSMENT:  Progressive dementia, history of known coronary artery disease with a pacemaker, history of sick sinus syndrome, hypertension.  He also has a history of cholelithiasis.  We are weaning off the trazodone.  Will go to 25 mg every other day for 4 doses, then discontinue.  He does have an appointment for his pacemaker check.  Otherwise, chart meds reviewed.  Continue with present plans.         RIDGE QUINN MD             D: 2018   T: 2018   MT: ELISHA      Name:     ESA LEON   MRN:      [id number]        Account:      [id number]   :      1930           Visit Date:   2018      Document: [id number]       [facility] [place]

## 2018-04-16 NOTE — ANESTHESIA POSTPROCEDURE EVALUATION
Patient: Toi Cowart    Procedure(s):  Pacemaker Generator Change - Wound Class: I-Clean    Diagnosis:Pacemaker staus  Diagnosis Additional Information: No value filed.    Anesthesia Type:  MAC    Note:  Anesthesia Post Evaluation    Patient location during evaluation: Phase 2 and Bedside  Patient participation: Able to fully participate in evaluation  Level of consciousness: awake and alert  Pain management: adequate  Airway patency: patent  Cardiovascular status: acceptable  Respiratory status: acceptable  Hydration status: acceptable  PONV: none     Anesthetic complications: None          Last vitals:  Vitals:    04/16/18 0900 04/16/18 0915 04/16/18 0930   BP: 131/73 123/66 134/72   Resp:   14   Temp: 96.5  F (35.8  C)  98.5  F (36.9  C)   SpO2: 92%           Electronically Signed By: RYANNE North CRNA  April 16, 2018  10:30 AM

## 2018-04-16 NOTE — INTERVAL H&P NOTE
I saw and examined Toi JAYLEEN Jacintoleoncio.  I have reviewed the history and physical and find no changes to the patient's medical status or condition with the exceptions noted below.     Heron Baker   7:36 AM 4/16/2018

## 2018-04-16 NOTE — ANESTHESIA CARE TRANSFER NOTE
Patient: Toi Cowart    Procedure(s):  Pacemaker Generator Change - Wound Class: I-Clean    Diagnosis: Pacemaker staus  Diagnosis Additional Information: No value filed.    Anesthesia Type:   MAC     Note:  Airway :Nasal Cannula  Patient transferred to:Phase II  Handoff Report: Identifed the Patient, Identified the Reponsible Provider, Reviewed the pertinent medical history, Discussed the surgical course, Reviewed Intra-OP anesthesia mangement and issues during anesthesia, Set expectations for post-procedure period and Allowed opportunity for questions and acknowledgement of understanding      Vitals: (Last set prior to Anesthesia Care Transfer)              Electronically Signed By: RYANNE North CRNA  April 16, 2018  10:30 AM

## 2018-04-16 NOTE — ADDENDUM NOTE
Addendum  created 04/16/18 1117 by Daniel Hernandez DO    Anesthesia Attestations filed, Sign clinical note

## 2018-04-16 NOTE — IP AVS SNAPSHOT
[facility]    [address]    [place] [address]    Phone:  [phone]    Fax:  [phone]                                       After Visit Summary   4/16/2018    Toi Cowart    MRN: [id number]           After Visit Summary Signature Page     I have received my discharge instructions, and my questions have been answered. I have discussed any challenges I see with this plan with the nurse or doctor.    ..........................................................................................................................................  Patient/Patient Representative Signature      ..........................................................................................................................................  Patient Representative Print Name and Relationship to Patient    ..................................................               ................................................  Date                                            Time    ..........................................................................................................................................  Reviewed by Signature/Title    ...................................................              ..............................................  Date                                                            Time

## 2018-04-16 NOTE — DISCHARGE INSTRUCTIONS
[facility]   Adult Discharge Orders & Instructions     For 24 hours after surgery    1. Get plenty of rest.  A responsible adult must stay with you for at least 24 hours after you leave the hospital.   2. Do not drive or use heavy equipment.  If you have weakness or tingling, don't drive or use heavy equipment until this feeling goes away.  3. Do not drink alcohol.  4. Avoid strenuous or risky activities.  Ask for help when climbing stairs.   5. You may feel lightheaded.  IF so, sit for a few minutes before standing.  Have someone help you get up.   6. If you have nausea (feel sick to your stomach): Drink only clear liquids such as apple juice, ginger ale, broth or 7-Up.  Rest may also help.  Be sure to drink enough fluids.  Move to a regular diet as you feel able.  7. You may have a slight fever. Call the doctor if your fever is over 101 F (38.3 C) (taken under the tongue) or lasts longer than 24 hours.  8. You may have a dry mouth, a sore throat, muscle aches or trouble sleeping.  These should go away after 24 hours.  9. Do not make important or legal decisions.   Call your doctor for any of the followin.  Signs of infection (fever, growing tenderness at the surgery site, a large amount of drainage or bleeding, severe pain, foul-smelling drainage, redness, swelling).    2. It has been over 8 to 10 hours since surgery and you are still not able to urinate (pass water).    3.  Headache for over 24 hours.    4.  Numbness, tingling or weakness the day after surgery (if you had spinal anesthesia).  To contact a doctor, call    [phone]___________________________

## 2018-04-16 NOTE — OP NOTE
Procedure Date: 04/16/2018      PREOPERATIVE DIAGNOSIS:  Sick sinus syndrome, pacemaker status, need for pacemaker generator change.      POSTOPERATIVE DIAGNOSIS:  Sick sinus syndrome, pacemaker status, need for pacemaker generator change.      PROCEDURE:  Pacemaker generator change.      SURGEON:  Heron Baker MD      ASSISTANT:  Geoffrey Zuñiga RN      ESTIMATED BLOOD LOSS:  Less than 3 mL.      SPECIMENS:  None.      FINDINGS:  The previously installed right atrial lead had a voltage of 0.05 and impedance of 455 ohms.  The previously installed right ventricular lead had a voltage of 0.5 and an impedance of 584.  These were both left in place.      DETAILS OF PROCEDURE:  The patient was taken to the OR and positioned supine on the operating table.  His upper chest was sterilely prepped and draped in the usual manner.  After timeout, we all agreed to proceed with pacemaker generator change.  Local anesthetic of 0.5% Marcaine and 1% lidocaine was injected into the skin and subcutaneous tissues overlying the pocket.  A scalpel was used to incise his previous incision.  Electrocautery was then used to dissect down through subcutaneous tissues and dissect the capsule free from the pacemaker.  One previous Nurolon suture was used to secure it down.  This was divided.  The pacemaker was then delivered out of the patient.  The atrial lead was then disconnected and evaluated.  This was found to be in good condition.  Next, the ventricular lead was removed from the old pacemaker.  The external cables were then connected and the patient was continued to be paced with the external device.  Once we had assessed this lead and found it to be of acceptable condition, we then proceeded connecting it to his new pacemaker generator.  This was a model VEDR01 with a serial number of [id number] and the device  is [employer].  This was connected and lead secured down.  The leads were coiled beneath the pacemaker and  reinserted into his previously dissected pocket.  The subcutaneous tissues were closed with 3-0 Vicryl.  The skin was closed with 4-0 Monocryl.  Liquid bandage was used to secure the skin and no dressing was applied.  At the end the case, all suture, needle and instrument counts were correct.         TILA CARDONA MD             D: 2018   T: 2018   MT: NTS      Name:     ESA LEON   MRN:      [id number]        Account:        [id number]   :      1930           Procedure Date: 2018      Document: [id number]

## 2018-04-16 NOTE — BRIEF OP NOTE
[facility] Brief Operative Note    Pre-operative diagnosis: Pacemaker staus   Post-operative diagnosis Sick sinus syndrome, pacemaker status needing replacement.    Procedure: Procedure(s):  Pacemaker Generator Change - Wound Class: I-Clean   Surgeon(s): Surgeon(s) and Role:     * Heron Baker MD - Primary   Estimated blood loss: * No values recorded between 4/16/2018  8:15 AM and 4/16/2018  8:45 AM *    Specimens: * No specimens in log *   Findings: Acceptable voltage and impedence on previous leads.        Dict #[id number]

## 2018-04-16 NOTE — ANESTHESIA PREPROCEDURE EVALUATION
Anesthesia Evaluation     .             ROS/MED HX    ENT/Pulmonary:  - neg pulmonary ROS     Neurologic:  - neg neurologic ROS     Cardiovascular:  - neg cardiovascular ROS   (+) hypertension--CAD, --. : . . . :. .       METS/Exercise Tolerance:     Hematologic:  - neg hematologic  ROS       Musculoskeletal:  - neg musculoskeletal ROS       GI/Hepatic:  - neg GI/hepatic ROS   (+) GERD       Renal/Genitourinary:  - ROS Renal section negative       Endo:  - neg endo ROS       Psychiatric:  - neg psychiatric ROS       Infectious Disease:  - neg infectious disease ROS       Malignancy:      - no malignancy   Other:    (+) No chance of pregnancy C-spine cleared: N/A, no H/O Chronic Pain,no other significant disability                    Physical Exam  Normal systems: cardiovascular and pulmonary    Airway   Mallampati: II  TM distance: >3 FB  Neck ROM: full    Dental   (+) missing and chipped    Cardiovascular       Pulmonary                     Anesthesia Plan      History & Physical Review      ASA Status:  3 .    NPO Status:  > 6 hours    Plan for MAC Maintenance will be TIVA.           Postoperative Care      Consents  Anesthetic plan, risks, benefits and alternatives discussed with: .  Use of blood products discussed: No .   .                         .

## 2018-04-16 NOTE — OR NURSING
Pt has been discharged to nursing home at 0950 via wheelchair accompanied by transport company.    Written discharge instructions were provided to nursing home, report called to [facility].  Prescriptions were N/A.      Patient and adult caring for them verbalize understanding of discharge instructions including no driving until tomorrow and no longer taking narcotic pain medications - no operating mechanical equipment and no making any important decisions.They understand reason for discharge, and necessary follow-up appointments.      Darshana Duke RN

## 2018-04-16 NOTE — ANESTHESIA POSTPROCEDURE EVALUATION
Patient: Toi Cowart    Procedure(s):  Pacemaker Generator Change - Wound Class: I-Clean    Diagnosis:Pacemaker staus  Diagnosis Additional Information: No value filed.    Anesthesia Type:  MAC    Note:  Anesthesia Post Evaluation    Patient location during evaluation: PACU  Patient participation: Able to fully participate in evaluation  Level of consciousness: awake and alert  Pain management: adequate  Airway patency: patent  Cardiovascular status: acceptable  Respiratory status: acceptable  Hydration status: acceptable  PONV: none     Anesthetic complications: None          Last vitals:  Vitals:    04/16/18 0900 04/16/18 0915 04/16/18 0930   BP: 131/73 123/66 134/72   Resp:   14   Temp: 96.5  F (35.8  C)  98.5  F (36.9  C)   SpO2: 92%           Electronically Signed By: Daniel Hernandez DO  April 16, 2018  11:17 AM

## 2018-04-16 NOTE — IP AVS SNAPSHOT
MRN:[id number]                      After Visit Summary   4/16/2018    Toi Cowart    MRN: [id number]           Thank you!     Thank you for choosing [facility] for your care. Our goal is always to provide you with excellent care. Hearing back from our patients is one way we can continue to improve our services. Please take a few minutes to complete the written survey that you may receive in the mail after you visit with us. Thank you!        Patient Information     Date Of Birth          8/19/1930        About your hospital stay     You were admitted on:  April 16, 2018 You last received care in the:  [facility]    You were discharged on:  April 16, 2018       Who to Call     For medical emergencies, please call 911.  For non-urgent questions about your medical care, please call your primary care provider or clinic, [phone]  For questions related to your surgery, please call your surgery clinic        Attending Provider     Provider Specialty    Heron Baker MD Surgery       Primary Care Provider Office Phone # Fax #    R Julio Bell [phone]      Further instructions from your care team       [facility]   Adult Discharge Orders & Instructions     For 24 hours after surgery    1. Get plenty of rest.  A responsible adult must stay with you for at least 24 hours after you leave the hospital.   2. Do not drive or use heavy equipment.  If you have weakness or tingling, don't drive or use heavy equipment until this feeling goes away.  3. Do not drink alcohol.  4. Avoid strenuous or risky activities.  Ask for help when climbing stairs.   5. You may feel lightheaded.  IF so, sit for a few minutes before standing.  Have someone help you get up.   6. If you have nausea (feel sick to your stomach): Drink only clear liquids such as apple juice, ginger ale, broth or 7-Up.  Rest may also help.  Be sure to drink enough fluids.  Move to a  "regular diet as you feel able.  7. You may have a slight fever. Call the doctor if your fever is over 101 F (38.3 C) (taken under the tongue) or lasts longer than 24 hours.  8. You may have a dry mouth, a sore throat, muscle aches or trouble sleeping.  These should go away after 24 hours.  9. Do not make important or legal decisions.   Call your doctor for any of the followin.  Signs of infection (fever, growing tenderness at the surgery site, a large amount of drainage or bleeding, severe pain, foul-smelling drainage, redness, swelling).    2. It has been over 8 to 10 hours since surgery and you are still not able to urinate (pass water).    3.  Headache for over 24 hours.    4.  Numbness, tingling or weakness the day after surgery (if you had spinal anesthesia).  To contact a doctor, call    [phone]___________________________    Pending Results     No orders found from 2018 to 2018.            Admission Information     Date & Time Provider Department Dept. Phone    2018 Heron Baker MD [facility] [phone]      Your Vitals Were     Blood Pressure Temperature Respirations Height Weight Pulse Oximetry    131/68 96.4  F (35.8  C) (Temporal) 16 1.702 m (5' 7\") 78.5 kg (173 lb) 98%    BMI (Body Mass Index)                   27.1 kg/m2           [employer] Information     [employer] lets you send messages to your doctor, view your test results, renew your prescriptions, schedule appointments and more. To sign up, go to [email or web address] . Click on \"Log in\" on the left side of the screen, which will take you to the Welcome page. Then click on \"Sign up Now\" on the right side of the page.     You will be asked to enter the access code listed below, as well as some personal information. Please follow the directions to create your username and password.     Your access code is: [email or web address]  Expires: 2018 11:47 AM     Your access code will  in 90 days. If you " need help or a new code, please call your [facility] clinic or [phone].        Care EveryWhere ID     This is your Care EveryWhere ID. This could be used by other organizations to access your [facility] medical records  [id number]        Equal Access to Services     KIRAN RÍOS : Francheska zimmerman bronsonrj Shaji, walisada luqadaha, qaybta kaalmada eber, estiven levarin hayaapeggy ahnerin ambrosio eunice light. So [facility] [phone].    ATENCIÓN: Si habla español, tiene a lagos disposición servicios gratuitos de asistencia lingüística. Llame al [phone].    We comply with applicable federal civil rights laws and [place] laws. We do not discriminate on the basis of race, color, national origin, age, disability, sex, sexual orientation, or gender identity.               Review of your medicines      CONTINUE these medicines which have NOT CHANGED        Dose / Directions    calcium carbonate 500 MG chewable tablet   Commonly known as:  TUMS        Dose:  1 chew tab   Take 1 chew tab by mouth every 6 hours as needed for heartburn   Refills:  0       CALMOSEPTINE 0.44-20.6 % Oint   Used for:  Skin irritation   Generic drug:  Menthol-Zinc Oxide        USE AS NEEDED.   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   Quantity:  84 capsule   Refills:  0       loperamide 2 MG capsule   Commonly known as:  IMODIUM   Used for:  Diarrhea        TAKE 1 CAPSULE FOUR TIMES DAILY FOR DIARRHEA AS NEEDED   Quantity:  120 capsule   Refills:  0       nystatin 374837 UNIT/GM Powd   Commonly known as:  MYCOSTATIN   Used for:  Yeast infection of the skin        Apply topically 2 times daily Apply to groin area twice daily.   Quantity:  60 g   Refills:  0       omeprazole 20 MG CR capsule   Commonly known as:  priLOSEC   Used for:  Gastroesophageal  reflux disease without esophagitis        TAKE 1 CAPSULE DAILY ON EMPTY STOMACH.   Quantity:  28 capsule   Refills:  11       Rectal Barrier   Used for:  Generalized muscle weakness        Apply to affected areas as needed daily   Quantity:  90 g   Refills:  0       SB IBUPROFEN 200 MG tablet   Used for:  Pain   Generic drug:  ibuprofen        TAKE 1 TABLET EVERY 4 HOURS AS NEEDED FOR PAIN OR FEVER   Quantity:  100 tablet   Refills:  5       traZODone 50 MG tablet   Commonly known as:  DESYREL   Used for:  Primary insomnia        TAKE ONE TABLET AT BEDTIME.   Quantity:  28 tablet   Refills:  4                Protect others around you: Learn how to safely use, store and throw away your medicines at www.disposemymeds.org.             Medication List: This is a list of all your medications and when to take them. Check marks below indicate your daily home schedule. Keep this list as a reference.      Medications           Morning Afternoon Evening Bedtime As Needed    calcium carbonate 500 MG chewable tablet   Commonly known as:  TUMS   Take 1 chew tab by mouth every 6 hours as needed for heartburn                                CALMOSEPTINE 0.44-20.6 % Oint   USE AS NEEDED.   Generic drug:  Menthol-Zinc Oxide                                divalproex sodium delayed-release 125 MG DR capsule   Commonly known as:  DEPAKOTE SPRINKLE   TAKE (1) CAPSULE THREE TIMES DAILY.                                loperamide 2 MG capsule   Commonly known as:  IMODIUM   TAKE 1 CAPSULE FOUR TIMES DAILY FOR DIARRHEA AS NEEDED                                nystatin [id number] UNIT/GM Powd   Commonly known as:  MYCOSTATIN   Apply topically 2 times daily Apply to groin area twice daily.                                omeprazole 20 MG CR capsule   Commonly known as:  priLOSEC   TAKE 1 CAPSULE DAILY ON EMPTY STOMACH.                                Rectal Barrier   Apply to affected areas as needed daily                                SB IBUPROFEN  200 MG tablet   TAKE 1 TABLET EVERY 4 HOURS AS NEEDED FOR PAIN OR FEVER   Generic drug:  ibuprofen                                traZODone 50 MG tablet   Commonly known as:  DESYREL   TAKE ONE TABLET AT BEDTIME.

## 2018-04-18 ENCOUNTER — TELEPHONE (OUTPATIENT)
Dept: FAMILY MEDICINE | Facility: OTHER | Age: 83
End: 2018-04-18

## 2018-04-18 ENCOUNTER — HOSPITAL ENCOUNTER (INPATIENT)
Facility: HOSPITAL | Age: 83
LOS: 2 days | Discharge: NURSING, REHAB OR LONG-TERM CARE | DRG: 871 | End: 2018-04-20
Attending: PHYSICIAN ASSISTANT | Admitting: INTERNAL MEDICINE
Payer: COMMERCIAL

## 2018-04-18 ENCOUNTER — APPOINTMENT (OUTPATIENT)
Dept: GENERAL RADIOLOGY | Facility: HOSPITAL | Age: 83
DRG: 871 | End: 2018-04-18
Attending: PHYSICIAN ASSISTANT
Payer: COMMERCIAL

## 2018-04-18 DIAGNOSIS — J69.0 ASPIRATION PNEUMONIA OF BOTH LUNGS, UNSPECIFIED ASPIRATION PNEUMONIA TYPE, UNSPECIFIED PART OF LUNG (H): Primary | ICD-10-CM

## 2018-04-18 DIAGNOSIS — J18.9 HEALTHCARE-ASSOCIATED PNEUMONIA: ICD-10-CM

## 2018-04-18 LAB
ALBUMIN SERPL-MCNC: 3.8 G/DL (ref 3.4–5)
ALP SERPL-CCNC: 62 U/L (ref 40–150)
ALT SERPL W P-5'-P-CCNC: 14 U/L (ref 0–70)
ANION GAP SERPL CALCULATED.3IONS-SCNC: 12 MMOL/L (ref 3–14)
AST SERPL W P-5'-P-CCNC: 14 U/L (ref 0–45)
BASOPHILS # BLD AUTO: 0 10E9/L (ref 0–0.2)
BASOPHILS NFR BLD AUTO: 0.3 %
BILIRUB SERPL-MCNC: 0.8 MG/DL (ref 0.2–1.3)
BUN SERPL-MCNC: 14 MG/DL (ref 7–30)
CALCIUM SERPL-MCNC: 9.1 MG/DL (ref 8.5–10.1)
CHLORIDE SERPL-SCNC: 102 MMOL/L (ref 94–109)
CO2 SERPL-SCNC: 24 MMOL/L (ref 20–32)
CREAT SERPL-MCNC: 0.84 MG/DL (ref 0.66–1.25)
CRP SERPL-MCNC: 19.7 MG/L (ref 0–8)
DIFFERENTIAL METHOD BLD: ABNORMAL
EOSINOPHIL # BLD AUTO: 0.1 10E9/L (ref 0–0.7)
EOSINOPHIL NFR BLD AUTO: 0.7 %
ERYTHROCYTE [DISTWIDTH] IN BLOOD BY AUTOMATED COUNT: 14 % (ref 10–15)
FLUAV+FLUBV AG SPEC QL: NEGATIVE
FLUAV+FLUBV AG SPEC QL: NEGATIVE
GFR SERPL CREATININE-BSD FRML MDRD: 87 ML/MIN/1.7M2
GLUCOSE SERPL-MCNC: 118 MG/DL (ref 70–99)
HCT VFR BLD AUTO: 45.6 % (ref 40–53)
HGB BLD-MCNC: 15.4 G/DL (ref 13.3–17.7)
IMM GRANULOCYTES # BLD: 0 10E9/L (ref 0–0.4)
IMM GRANULOCYTES NFR BLD: 0.3 %
LACTATE SERPL-SCNC: 3 MMOL/L (ref 0.4–2)
LACTATE SERPL-SCNC: 3.2 MMOL/L (ref 0.4–2)
LYMPHOCYTES # BLD AUTO: 0.8 10E9/L (ref 0.8–5.3)
LYMPHOCYTES NFR BLD AUTO: 8.5 %
MCH RBC QN AUTO: 29.7 PG (ref 26.5–33)
MCHC RBC AUTO-ENTMCNC: 33.8 G/DL (ref 31.5–36.5)
MCV RBC AUTO: 88 FL (ref 78–100)
MONOCYTES # BLD AUTO: 0.5 10E9/L (ref 0–1.3)
MONOCYTES NFR BLD AUTO: 5.1 %
NEUTROPHILS # BLD AUTO: 7.7 10E9/L (ref 1.6–8.3)
NEUTROPHILS NFR BLD AUTO: 85.1 %
NRBC # BLD AUTO: 0 10*3/UL
NRBC BLD AUTO-RTO: 0 /100
PLATELET # BLD AUTO: 118 10E9/L (ref 150–450)
POTASSIUM SERPL-SCNC: 3.9 MMOL/L (ref 3.4–5.3)
PROCALCITONIN SERPL-MCNC: 0.62 NG/ML
PROT SERPL-MCNC: 7.4 G/DL (ref 6.8–8.8)
RBC # BLD AUTO: 5.19 10E12/L (ref 4.4–5.9)
SODIUM SERPL-SCNC: 138 MMOL/L (ref 133–144)
SPECIMEN SOURCE: NORMAL
WBC # BLD AUTO: 9 10E9/L (ref 4–11)

## 2018-04-18 PROCEDURE — 25000128 H RX IP 250 OP 636: Performed by: PHYSICIAN ASSISTANT

## 2018-04-18 PROCEDURE — 87804 INFLUENZA ASSAY W/OPTIC: CPT | Performed by: PHYSICIAN ASSISTANT

## 2018-04-18 PROCEDURE — 99285 EMERGENCY DEPT VISIT HI MDM: CPT | Mod: 25

## 2018-04-18 PROCEDURE — 93010 ELECTROCARDIOGRAM REPORT: CPT | Performed by: INTERNAL MEDICINE

## 2018-04-18 PROCEDURE — 25000125 ZZHC RX 250: Performed by: INTERNAL MEDICINE

## 2018-04-18 PROCEDURE — 93005 ELECTROCARDIOGRAM TRACING: CPT

## 2018-04-18 PROCEDURE — 40000786 ZZHCL STATISTIC ACTIVE MRSA SURVEILLANCE CULTURE: Performed by: INTERNAL MEDICINE

## 2018-04-18 PROCEDURE — 96361 HYDRATE IV INFUSION ADD-ON: CPT

## 2018-04-18 PROCEDURE — 96368 THER/DIAG CONCURRENT INF: CPT

## 2018-04-18 PROCEDURE — 80053 COMPREHEN METABOLIC PANEL: CPT | Performed by: PHYSICIAN ASSISTANT

## 2018-04-18 PROCEDURE — 96365 THER/PROPH/DIAG IV INF INIT: CPT

## 2018-04-18 PROCEDURE — 25000132 ZZH RX MED GY IP 250 OP 250 PS 637: Performed by: PHYSICIAN ASSISTANT

## 2018-04-18 PROCEDURE — 85025 COMPLETE CBC W/AUTO DIFF WBC: CPT | Performed by: PHYSICIAN ASSISTANT

## 2018-04-18 PROCEDURE — 12000011 ZZH R&B MS OVERFLOW

## 2018-04-18 PROCEDURE — 25000132 ZZH RX MED GY IP 250 OP 250 PS 637: Performed by: INTERNAL MEDICINE

## 2018-04-18 PROCEDURE — 25000128 H RX IP 250 OP 636: Performed by: INTERNAL MEDICINE

## 2018-04-18 PROCEDURE — 86140 C-REACTIVE PROTEIN: CPT | Performed by: PHYSICIAN ASSISTANT

## 2018-04-18 PROCEDURE — 83605 ASSAY OF LACTIC ACID: CPT | Performed by: PHYSICIAN ASSISTANT

## 2018-04-18 PROCEDURE — 87040 BLOOD CULTURE FOR BACTERIA: CPT | Performed by: PHYSICIAN ASSISTANT

## 2018-04-18 PROCEDURE — 99223 1ST HOSP IP/OBS HIGH 75: CPT | Mod: AI | Performed by: INTERNAL MEDICINE

## 2018-04-18 PROCEDURE — 36415 COLL VENOUS BLD VENIPUNCTURE: CPT | Performed by: PHYSICIAN ASSISTANT

## 2018-04-18 PROCEDURE — 99285 EMERGENCY DEPT VISIT HI MDM: CPT | Mod: Z6 | Performed by: PHYSICIAN ASSISTANT

## 2018-04-18 PROCEDURE — 84145 PROCALCITONIN (PCT): CPT | Performed by: PHYSICIAN ASSISTANT

## 2018-04-18 PROCEDURE — 71045 X-RAY EXAM CHEST 1 VIEW: CPT | Mod: TC

## 2018-04-18 RX ORDER — GENTAMICIN SULFATE 3 MG/ML
1 SOLUTION/ DROPS OPHTHALMIC 3 TIMES DAILY
COMMUNITY
End: 2019-01-01

## 2018-04-18 RX ORDER — ONDANSETRON 4 MG/1
4 TABLET, ORALLY DISINTEGRATING ORAL EVERY 6 HOURS PRN
Status: DISCONTINUED | OUTPATIENT
Start: 2018-04-18 | End: 2018-04-20 | Stop reason: HOSPADM

## 2018-04-18 RX ORDER — ACETAMINOPHEN 325 MG/1
650 TABLET ORAL EVERY 4 HOURS PRN
Status: DISCONTINUED | OUTPATIENT
Start: 2018-04-18 | End: 2018-04-20 | Stop reason: HOSPADM

## 2018-04-18 RX ORDER — ACETAMINOPHEN 325 MG/1
975 TABLET ORAL ONCE
Status: COMPLETED | OUTPATIENT
Start: 2018-04-18 | End: 2018-04-18

## 2018-04-18 RX ORDER — FENTANYL CITRATE 50 UG/ML
50 INJECTION, SOLUTION INTRAMUSCULAR; INTRAVENOUS
Status: DISCONTINUED | OUTPATIENT
Start: 2018-04-18 | End: 2018-04-18

## 2018-04-18 RX ORDER — SODIUM CHLORIDE, SODIUM LACTATE, POTASSIUM CHLORIDE, CALCIUM CHLORIDE 600; 310; 30; 20 MG/100ML; MG/100ML; MG/100ML; MG/100ML
INJECTION, SOLUTION INTRAVENOUS ONCE
Status: COMPLETED | OUTPATIENT
Start: 2018-04-18 | End: 2018-04-18

## 2018-04-18 RX ORDER — PROCHLORPERAZINE MALEATE 5 MG
5 TABLET ORAL EVERY 6 HOURS PRN
Status: DISCONTINUED | OUTPATIENT
Start: 2018-04-18 | End: 2018-04-20 | Stop reason: HOSPADM

## 2018-04-18 RX ORDER — AMOXICILLIN 250 MG
1 CAPSULE ORAL 2 TIMES DAILY
Status: DISCONTINUED | OUTPATIENT
Start: 2018-04-18 | End: 2018-04-20 | Stop reason: HOSPADM

## 2018-04-18 RX ORDER — AMOXICILLIN 250 MG
2 CAPSULE ORAL 2 TIMES DAILY
Status: DISCONTINUED | OUTPATIENT
Start: 2018-04-18 | End: 2018-04-20 | Stop reason: HOSPADM

## 2018-04-18 RX ORDER — HYDROMORPHONE HYDROCHLORIDE 1 MG/ML
.3-.5 INJECTION, SOLUTION INTRAMUSCULAR; INTRAVENOUS; SUBCUTANEOUS
Status: DISCONTINUED | OUTPATIENT
Start: 2018-04-18 | End: 2018-04-20 | Stop reason: HOSPADM

## 2018-04-18 RX ORDER — SODIUM CHLORIDE 9 MG/ML
INJECTION, SOLUTION INTRAVENOUS CONTINUOUS
Status: DISCONTINUED | OUTPATIENT
Start: 2018-04-18 | End: 2018-04-19

## 2018-04-18 RX ORDER — PROCHLORPERAZINE 25 MG
12.5 SUPPOSITORY, RECTAL RECTAL EVERY 12 HOURS PRN
Status: DISCONTINUED | OUTPATIENT
Start: 2018-04-18 | End: 2018-04-20 | Stop reason: HOSPADM

## 2018-04-18 RX ORDER — OXYCODONE HYDROCHLORIDE 5 MG/1
5-10 TABLET ORAL
Status: DISCONTINUED | OUTPATIENT
Start: 2018-04-18 | End: 2018-04-20 | Stop reason: HOSPADM

## 2018-04-18 RX ORDER — ONDANSETRON 2 MG/ML
4 INJECTION INTRAMUSCULAR; INTRAVENOUS EVERY 6 HOURS PRN
Status: DISCONTINUED | OUTPATIENT
Start: 2018-04-18 | End: 2018-04-20 | Stop reason: HOSPADM

## 2018-04-18 RX ORDER — HYDROCODONE BITARTRATE AND ACETAMINOPHEN 5; 325 MG/1; MG/1
1 TABLET ORAL 2 TIMES DAILY PRN
COMMUNITY
End: 2018-01-01

## 2018-04-18 RX ORDER — NALOXONE HYDROCHLORIDE 0.4 MG/ML
.1-.4 INJECTION, SOLUTION INTRAMUSCULAR; INTRAVENOUS; SUBCUTANEOUS
Status: DISCONTINUED | OUTPATIENT
Start: 2018-04-18 | End: 2018-04-20 | Stop reason: HOSPADM

## 2018-04-18 RX ADMIN — TAZOBACTAM SODIUM AND PIPERACILLIN SODIUM 4.5 G: 500; 4 INJECTION, SOLUTION INTRAVENOUS at 23:34

## 2018-04-18 RX ADMIN — SODIUM CHLORIDE: 9 INJECTION, SOLUTION INTRAVENOUS at 20:28

## 2018-04-18 RX ADMIN — TAZOBACTAM SODIUM AND PIPERACILLIN SODIUM 4.5 G: 500; 4 INJECTION, SOLUTION INTRAVENOUS at 18:36

## 2018-04-18 RX ADMIN — SODIUM CHLORIDE, POTASSIUM CHLORIDE, SODIUM LACTATE AND CALCIUM CHLORIDE: 600; 310; 30; 20 INJECTION, SOLUTION INTRAVENOUS at 17:32

## 2018-04-18 RX ADMIN — HYDROMORPHONE HYDROCHLORIDE 0.5 MG: 1 INJECTION, SOLUTION INTRAMUSCULAR; INTRAVENOUS; SUBCUTANEOUS at 23:45

## 2018-04-18 RX ADMIN — OXYCODONE HYDROCHLORIDE 5 MG: 5 TABLET ORAL at 20:28

## 2018-04-18 RX ADMIN — FAMOTIDINE 20 MG: 20 INJECTION, SOLUTION INTRAVENOUS at 20:28

## 2018-04-18 RX ADMIN — SODIUM CHLORIDE, POTASSIUM CHLORIDE, SODIUM LACTATE AND CALCIUM CHLORIDE 1000 ML: 600; 310; 30; 20 INJECTION, SOLUTION INTRAVENOUS at 19:05

## 2018-04-18 RX ADMIN — ENOXAPARIN SODIUM 40 MG: 40 INJECTION SUBCUTANEOUS at 20:28

## 2018-04-18 RX ADMIN — HYDROMORPHONE HYDROCHLORIDE 0.5 MG: 1 INJECTION, SOLUTION INTRAMUSCULAR; INTRAVENOUS; SUBCUTANEOUS at 20:28

## 2018-04-18 RX ADMIN — ACETAMINOPHEN 975 MG: 325 TABLET, FILM COATED ORAL at 17:29

## 2018-04-18 RX ADMIN — VANCOMYCIN HYDROCHLORIDE 1250 MG: 1 INJECTION, POWDER, LYOPHILIZED, FOR SOLUTION INTRAVENOUS at 18:00

## 2018-04-18 ASSESSMENT — ACTIVITIES OF DAILY LIVING (ADL)
COGNITION: 1 - ATTENTION OR MEMORY DEFICITS
DRESS: 2-->ASSISTIVE PERSON
AMBULATION: 1-->ASSISTIVE EQUIPMENT
BATHING: 2-->ASSISTIVE PERSON
FALL_HISTORY_WITHIN_LAST_SIX_MONTHS: NO
RETIRED_EATING: 0-->INDEPENDENT
RETIRED_COMMUNICATION: 2-->DIFFICULTY UNDERSTANDING (NOT RELATED TO LANGUAGE BARRIER)
TRANSFERRING: 1-->ASSISTIVE EQUIPMENT
SWALLOWING: 0-->SWALLOWS FOODS/LIQUIDS WITHOUT DIFFICULTY
TOILETING: 3-->ASSISTIVE EQUIPMENT AND PERSON

## 2018-04-18 NOTE — IP AVS SNAPSHOT
MRN:[id number]                      After Visit Summary   4/18/2018    Toi Cowart    MRN: [id number]           Thank you!     Thank you for choosing [facility] for your care. Our goal is always to provide you with excellent care. Hearing back from our patients is one way we can continue to improve our services. Please take a few minutes to complete the written survey that you may receive in the mail after you visit with us. Thank you!        Patient Information     Date Of Birth          8/19/1930        Designated Caregiver       Most Recent Value    Caregiver    Will someone help with your care after discharge? no      About your hospital stay     You were admitted on:  April 18, 2018 You last received care in the:  14 Intensive Care Unit    You were discharged on:  April 20, 2018        Reason for your hospital stay       Toi Cowart is a 87 year old  man who was admitted on 4/18/2018.  He presented from [facility] where fever and increased confusion was noted.  History significant for established dementia, HTN, CAD, SA node dysfunction with PPM and battery change 4/16/2018.  He showed mild tachypnea on presentation without significant hypoxemia.  He responded to usual therapy including antibiotics and supportive measures.  Diagnostic considerations include infectious pneumonia versus noninfectious aspiration pneumonitis.  Mental status at the time of discharge likely nearly at his baseline.  Discharge to complete a course of Augmentin.                  Who to Call     For medical emergencies, please call 911.  For non-urgent questions about your medical care, please call your primary care provider or clinic, [phone]          Attending Provider     Provider Specialty    Chana Arevalo PA-C Emergency Medicine    Vladimir Lino MD Internal Medicine    Damir Raymond MD Internal Medicine       Primary Care Provider Office Phone # Fax #    R Julio Bell [phone]  "[phone]      After Care Instructions     Activity - Up with nursing assistance           Additional Discharge Instructions       Aspiration precautions            Advance Diet as Tolerated       Follow this diet upon discharge: Orders Placed This Encounter      Regular Diet Adult            Fall precautions           General info for SNF       Length of Stay Estimate: Long Term Care  Condition at Discharge: Improving  Level of care:board and care  Rehabilitation Potential: Fair  Admission H&P remains valid and up-to-date: Yes  Recent Chemotherapy: N/A  Use Nursing Home Standing Orders: Yes            Mantoux instructions       Give two-step Mantoux (PPD) Per Facility Policy Yes                  Follow-up Appointments     Follow Up and recommended labs and tests       Follow up with Nursing home physician.    CXR requested in 4-6 weeks                  Additional Services     Speech Language Path Adult Consult       Evaluate and treat as clinically indicated.    Reason:  Please evaluate swallowing function and aspiration risk                  Future tests that were ordered for you     XR Chest 2 Views                 Pending Results     Date and Time Order Name Status Description    4/18/2018 1711 Blood culture Preliminary     4/18/2018 1700 Blood culture Preliminary             Statement of Approval     Ordered          04/20/18 1007  I have reviewed and agree with all the recommendations and orders detailed in this document.  EFFECTIVE NOW     Approved and electronically signed by:  Damir Raymond MD             Admission Information     Date & Time Provider Department Dept. Phone    4/18/2018 Damir Raymond MD 14 Intensive Care Unit [phone]      Your Vitals Were     Blood Pressure Temperature Respirations Height Weight Pulse Oximetry    165/74 98.1  F (36.7  C) 18 1.702 m (5' 7\") 82.8 kg (182 lb 8.7 oz) 97%    BMI (Body Mass Index)                   28.59 kg/m2           MyChart Information  " "   [employer] lets you send messages to your doctor, view your test results, renew your prescriptions, schedule appointments and more. To sign up, go to [email or web address] - teamwork across [employer] . Click on \"Log in\" on the left side of the screen, which will take you to the Welcome page. Then click on \"Sign up Now\" on the right side of the page.     You will be asked to enter the access code listed below, as well as some personal information. Please follow the directions to create your username and password.     Your access code is: [email or web address]  Expires: 2018 11:47 AM     Your access code will  in 90 days. If you need help or a new code, please call your [facility] clinic or [phone].        Care EveryWhere ID     This is your Care EveryWhere ID. This could be used by other organizations to access your [facility] medical records  [id number]        Equal Access to Services     KIRAN RÍOS AH: Hadii cristiana Girard, waaxda lucarmen, qaybta kaalmada adebell, estiven dawson . So [facility] [phone].    ATENCIÓN: Si habla español, tiene a lagos disposición servicios gratuitos de asistencia lingüística. Llame al [phone].    We comply with applicable federal civil rights laws and [place] laws. We do not discriminate on the basis of race, color, national origin, age, disability, sex, sexual orientation, or gender identity.               Review of your medicines      START taking        Dose / Directions    amoxicillin-clavulanate 875-125 MG per tablet   Commonly known as:  AUGMENTIN   Indication:  Healthcare-Associated Pneumonia, ?aspiration pneumonitis   Used for:  Healthcare-associated pneumonia, Aspiration pneumonia of both lungs, unspecified aspiration pneumonia type, unspecified part of lung (H)        Dose:  1 tablet   Take 1 tablet by mouth every 12 hours   Quantity:  10 tablet   Refills:  0         CONTINUE these medicines which have NOT CHANGED        Dose / Directions    CALMOSEPTINE " 0.44-20.6 % Oint   Used for:  Skin irritation   Generic drug:  Menthol-Zinc Oxide        USE AS NEEDED.   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   Quantity:  84 capsule   Refills:  0       gentamicin 0.3 % ophthalmic solution   Commonly known as:  GARAMYCIN        Dose:  1 drop   Place 1 drop into both eyes 3 times daily   Refills:  0       HYDROcodone-acetaminophen 5-325 MG per tablet   Commonly known as:  NORCO        Dose:  1 tablet   Take 1 tablet by mouth 2 times daily as needed for severe pain   Refills:  0       loperamide 2 MG capsule   Commonly known as:  IMODIUM   Used for:  Diarrhea        TAKE 1 CAPSULE FOUR TIMES DAILY FOR DIARRHEA AS NEEDED   Quantity:  120 capsule   Refills:  0       nystatin 627292 UNIT/GM Powd   Commonly known as:  MYCOSTATIN   Used for:  Yeast infection of the skin        Apply topically 2 times daily Apply to groin area twice daily.   Quantity:  60 g   Refills:  0       omeprazole 20 MG CR capsule   Commonly known as:  priLOSEC   Used for:  Gastroesophageal reflux disease without esophagitis        TAKE 1 CAPSULE DAILY ON EMPTY STOMACH.   Quantity:  28 capsule   Refills:  11       psyllium Packet   Commonly known as:  METAMUCIL/KONSYL        Dose:  1 packet   Take 1 packet by mouth daily   Refills:  0       Rectal Barrier   Used for:  Generalized muscle weakness        Apply to affected areas as needed daily   Quantity:  90 g   Refills:  0       SB IBUPROFEN 200 MG tablet   Used for:  Pain   Generic drug:  ibuprofen        TAKE 1 TABLET EVERY 4 HOURS AS NEEDED FOR PAIN OR FEVER   Quantity:  100 tablet   Refills:  5            Where to get your medicines      Some of these will need a paper prescription and others can be bought over the counter. Ask your nurse if you have  questions.     You don't need a prescription for these medications     amoxicillin-clavulanate 875-125 MG per tablet                Protect others around you: Learn how to safely use, store and throw away your medicines at www.disposemymeds.org.        ANTIBIOTIC INSTRUCTION     You've Been Prescribed an Antibiotic - Now What?  Your healthcare team thinks that you or your loved one might have an infection. Some infections can be treated with antibiotics, which are powerful, life-saving drugs. Like all medications, antibiotics have side effects and should only be used when necessary. There are some important things you should know about your antibiotic treatment.      Your healthcare team may run tests before you start taking an antibiotic.    Your team may take samples (e.g., from your blood, urine or other areas) to run tests to look for bacteria. These test can be important to determine if you need an antibiotic at all and, if you do, which antibiotic will work best.      Within a few days, your healthcare team might change or even stop your antibiotic.    Your team may start you on an antibiotic while they are working to find out what is making you sick.    Your team might change your antibiotic because test results show that a different antibiotic would be better to treat your infection.    In some cases, once your team has more information, they learn that you do not need an antibiotic at all. They may find out that you don't have an infection, or that the antibiotic you're taking won't work against your infection. For example, an infection caused by a virus can't be treated with antibiotics. Staying on an antibiotic when you don't need it is more likely to be harmful than helpful.      You may experience side effects from your antibiotic.    Like all medications, antibiotics have side effects. Some of these can be serious.    Let you healthcare team know if you have any known allergies when you are admitted to  the hospital.    One significant side effect of nearly all antibiotics is the risk of severe and sometimes deadly diarrhea caused by Clostridium difficile (C. Difficile). This occurs when a person takes antibiotics because some good germs are destroyed. Antibiotic use allows C. diificile to take over, putting patients at high risk for this serious infection.    As a patient or caregiver, it is important to understand your or your loved one's antibiotic treatment. It is especially important for caregivers to speak up when patients can't speak for themselves. Here are some important questions to ask your healthcare team.    What infection is this antibiotic treating and how do you know I have that infection?    What side effects might occur from this antibiotic?    How long will I need to take this antibiotic?    Is it safe to take this antibiotic with other medications or supplements (e.g., vitamins) that I am taking?     Are there any special directions I need to know about taking this antibiotic? For example, should I take it with food?    How will I be monitored to know whether my infection is responding to the antibiotic?    What tests may help to make sure the right antibiotic is prescribed for me?      Information provided by:  www.cdc.gov/getsmart  U.S. Department of Health and Human Services  Centers for disease Control and Prevention  National Center for Emerging and Zoonotic Infectious Diseases  Division of Healthcare Quality Promotion        Information about OPIOIDS     PRESCRIPTION OPIOIDS: WHAT YOU NEED TO KNOW   You have a prescription for an opioid (narcotic) pain medicine. Opioids can cause addiction. If you have a history of chemical dependency of any type, you are at a higher risk of becoming addicted to opioids. Only take this medicine after all other options have been tried. Take it for as short a time and as few doses as possible.     Do not:    Drive. If you drive while taking these medicines,  you could be arrested for driving under the influence (DUI).    Operate heavy machinery    Do any other dangerous activities while taking these medicines.     Drink any alcohol while taking these medicines.      Take with any other medicines that contain acetaminophen. Read all labels carefully. Look for the word  acetaminophen  or  Tylenol.  Ask your pharmacist if you have questions or are unsure.    Store your pills in a secure place, locked if possible. We will not replace any lost or stolen medicine. If you don t finish your medicine, please throw away (dispose) as directed by your pharmacist. The [place] Pollution Control Agency has more information about safe disposal: https://www.pca.[facility].mn.us/living-green/managing-unwanted-medications    All opioids tend to cause constipation. Drink plenty of water and eat foods that have a lot of fiber, such as fruits, vegetables, prune juice, apple juice and high-fiber cereal. Take a laxative (Miralax, milk of magnesia, Colace, Senna) if you don t move your bowels at least every other day.              Medication List: This is a list of all your medications and when to take them. Check marks below indicate your daily home schedule. Keep this list as a reference.      Medications           Morning Afternoon Evening Bedtime As Needed    amoxicillin-clavulanate 875-125 MG per tablet   Commonly known as:  AUGMENTIN   Take 1 tablet by mouth every 12 hours   Last time this was given:  1 tablet on 4/20/2018  9:50 AM                                CALMOSEPTINE 0.44-20.6 % Oint   USE AS NEEDED.   Generic drug:  Menthol-Zinc Oxide                                divalproex sodium delayed-release 125 MG DR capsule   Commonly known as:  DEPAKOTE SPRINKLE   TAKE (1) CAPSULE THREE TIMES DAILY.   Last time this was given:  125 mg on 4/20/2018  9:51 AM                                gentamicin 0.3 % ophthalmic solution   Commonly known as:  GARAMYCIN   Place 1 drop into both eyes 3  times daily   Last time this was given:  1 drop on 4/20/2018 11:27 AM                                HYDROcodone-acetaminophen 5-325 MG per tablet   Commonly known as:  NORCO   Take 1 tablet by mouth 2 times daily as needed for severe pain                                loperamide 2 MG capsule   Commonly known as:  IMODIUM   TAKE 1 CAPSULE FOUR TIMES DAILY FOR DIARRHEA AS NEEDED                                nystatin [id number] UNIT/GM Powd   Commonly known as:  MYCOSTATIN   Apply topically 2 times daily Apply to groin area twice daily.                                omeprazole 20 MG CR capsule   Commonly known as:  priLOSEC   TAKE 1 CAPSULE DAILY ON EMPTY STOMACH.   Last time this was given:  20 mg on 4/20/2018  9:50 AM                                psyllium Packet   Commonly known as:  METAMUCIL/KONSYL   Take 1 packet by mouth daily   Last time this was given:  1 packet on 4/20/2018  9:50 AM                                Rectal Barrier   Apply to affected areas as needed daily                                SB IBUPROFEN 200 MG tablet   TAKE 1 TABLET EVERY 4 HOURS AS NEEDED FOR PAIN OR FEVER   Generic drug:  ibuprofen                                          More Information        Pneumonia (Adult)  Pneumonia is an infection deep within the lungs. It is in the small air sacs (alveoli). Pneumonia may be caused by a virus or bacteria. Pneumonia caused by bacteria is usually treated with an antibiotic. Severe cases may need to be treated in the hospital. Milder cases can be treated at home. Symptoms usually start to get better during the first 2 days of treatment.    Home care  Follow these guidelines when caring for yourself at home:    Rest at home for the first 2 to 3 days, or until you feel stronger. Don t let yourself get overly tired when you go back to your activities.    Stay away from cigarette smoke - yours or other people s.    You may use acetaminophen or ibuprofen to control fever or pain, unless another  medicine was prescribed. If you have chronic liver or kidney disease, talk with your healthcare provider before using these medicines. Also talk with your provider if you ve had a stomach ulcer or gastrointestinal bleeding. Don t give aspirin to anyone younger than 18 years of age who is ill with a fever. It may cause severe liver damage.    Your appetite may be poor, so a light diet is fine.    Drink 6 to 8 glasses of fluids every day to make sure you are getting enough fluids. Beverages can include water, sport drinks, sodas without caffeine, juices, tea, or soup. Fluids will help loosen secretions in the lung. This will make it easier for you to cough up the phlegm (sputum). If you also have heart or kidney disease, check with your healthcare provider before you drink extra fluids.    Take antibiotic medicine prescribed until it is all gone, even if you are feeling better after a few days.  Follow-up care  Follow up with your healthcare provider in the next 2 to 3 days, or as advised. This is to be sure the medicine is helping you get better.  If you are 65 or older, you should get a pneumococcal vaccine and a yearly flu (influenza) shot. You should also get these vaccines if you have chronic lung disease like asthma, emphysema, or COPD. Recently, a second type of pneumonia vaccine has become available for everyone over 65 years old. This is in addition to the previous vaccine. Ask your provider about this.  When to seek medical advice  Call your healthcare provider right away if any of these occur:    You don t get better within the first 48 hours of treatment    Shortness of breath gets worse    Rapid breathing (more than 25 breaths per minute)    Coughing up blood    Chest pain gets worse with breathing    Fever of 100.4 F (38 C) or higher that doesn t get better with fever medicine    Weakness, dizziness, or fainting that gets worse    Thirst or dry mouth that gets worse    Sinus pain, headache, or a stiff  neck    Chest pain not caused by coughing  Date Last Reviewed: 1/1/2017 2000-2017 [employer]. [address] [place] [address], [place] [address]. All rights reserved. This information is not intended as a substitute for professional medical care. Always follow your healthcare professional's instructions.                Treating Pneumonia  Pneumonia is an infection of one or both of the lungs. Pneumonia:    Is usually caused by either a virus or a bacteria    Can be very serious, especially in infants, young children, and older adults. It s also serious for those with other long-term health problems or weakened immune systems.    Is sometimes treated at home and sometimes in the hospital    Antibiotic medicines  Antibiotics may be prescribed for pneumonia caused by bacteria. They may be pills (oral medicines), or shots (injections). Or they may be given by IV (intravenously) into a vein. If you are taking oral medicines at home:    Fill your prescription and start taking your medicine as soon as you can.    You will likely start to feel better in a day or 2, but don t stop taking the antibiotic.    Use a pill organizer to help you remember to take your medicine.    Let your healthcare provider know if you have side effects.    Take your medicine exactly as directed on the label. Talk to your provider or pharmacist if you have any questions.  Antiviral medicines  Antiviral medicine may be prescribed for pneumonia caused by a virus. For example, antiviral medicine may be prescribed for pneumonia caused by the flu virus. Antibiotics do not work against viruses. If you are taking antiviral medicine at home:    Fill your prescription and start taking your medicine as soon as you can.    Talk with your provider or pharmacist about possible side effects.    Take the medicine exactly as instructed.  To relieve symptoms  There are many medicines that can help relieve symptoms of pneumonia. Some are prescription and  some are over-the-counter.  Your healthcare provider may recommend:    Acetaminophen or ibuprofen to lower your fever and to lessen headache or other pain    Cough medicine to loosen mucus or to reduce coughing  Make sure you check with your healthcare provider or pharmacist before taking any over-the-counter medicines.  Special treatments  If you are hospitalized for pneumonia, you may have other therapies, including:    Inhaled medicines to help with breathing or chest congestion    Supplemental oxygen to increase low oxygen levels  Drink fluids and eat healthy  You should eat healthy to help your body fight the infection. Drinking a lot of fluids helps to replace fluids lost from fever and to loosen mucus in your chest.    Diet. Make healthy food choices, including fruits and vegetables, lean meats and other proteins, 100% whole grain and low- or no-fat dairy products.    Fluids. Drink at least 6 to 8 tall glasses a day. Water and 100% fruit or vegetable juice are best.  Get plenty of rest and sleep  You may be more tired than usual for a while. It is important to get enough sleep at night. It s also important to rest during the day. Talk with your healthcare provider if coughing or other symptoms are interfering with your sleep.  Preventing the spread of germs  The best thing you can do to prevent spreading germs is to wash your hands often. You should:    Rub your hand with soap and water for 20 to 30 seconds.    Clean in between your fingers, the backs of your hands, and around your nails.    Dry your hands on a separate towel or use paper towels.  You should also:    Keep alcohol-based hand  nearby.    Make sure you also clean surfaces that you touch. Use a product that kills all types of germs.    Stay away from others until you are feeling better.  When to call your healthcare provider  Call your healthcare provider if you have any of the following:    Symptoms get worse    Fever  continues    Shortness of breath gets worse    Increased mucus or mucus that is darker in color    Coughing gets worse    Lips or fingers are bluish in color    Side effects from your medicine   Date Last Reviewed: 12/1/2016 2000-2017 [employer]. [address] [place] [address], [place] [address]. All rights reserved. This information is not intended as a substitute for professional medical care. Always follow your healthcare professional's instructions.                What is Pneumonia?    Pneumonia is a serious lung infection. Many cases of pneumonia are caused by bacteria or viruses. Fungi may also cause pneumonia, but this is less common.  You may also get pneumonia after another illness, such as a cold, flu, or bronchitis. Those most at risk include older adults, smokers, and people with long-term (chronic) health problems or weak immune systems.  Healthy lungs    Air travels in and out of the lungs through tubes called airways.    The tubes branch into smaller passages called bronchioles. These end in tiny sacs called alveoli.    Blood vessels surrounding the alveoli take oxygen into the bloodstream. At the same time, the alveoli remove carbon dioxide (a waste gas) from the blood. The carbon dioxide is then exhaled.  When you have pneumonia    Pneumonia causes the bronchioles and the alveoli to fill with excess mucus and become inflamed.    Your body s response may be to cough. This can help clear out the fluid.    The fluid (or mucus) you cough up may appear green or dark yellow.    The excess mucus may make you feel short of breath.    The inflammation and infection may give you a fever.  What are the symptoms?  Symptoms of pneumonia can come without warning. At first, you may think you have a cold or flu. But symptoms may get worse quickly, turning into pneumonia. Symptoms can be different for bacterial and viral pneumonia. Common symptoms may include the following:    Severe cough with green or  yellow mucus that doesn't improve or that gets worse    Fever and chills    Nausea, vomiting or diarrhea    Shortness of breath with normal daily activities    Increased heart rate    Chest pain or discomfort when breathing in or coughing    Headache    Excessive sweating and clammy skin  Date Last Reviewed: 12/1/2016 2000-2017 The [employer]. [address] [place] [address]. All rights reserved. This information is not intended as a substitute for professional medical care. Always follow your healthcare professional's instructions.                Preventing Pneumonia  Pneumonia is an infection in one or both of the lungs. Those most at risk include older adults, smokers, and people with chronic lung diseases. For example, those with conditions like chronic obstructive pulmonary disease (COPD), including emphysema or chronic bronchitis, asthma, and those with weak immune systems. There are some things you can do to lessen the chance that you will get pneumonia.    Avoid infection    Wash your hands often:  ? Use soap and warm water.  ? If soap and water aren't available, use a hand  with alcohol in it.    Avoid touching your face and mouth with your hands.    Use disposable tissues instead of a handkerchief. Throw used tissues away.    Avoid people who have a cold or the flu.    Try to avoid crowded places.  Get vaccinated  Talk with your healthcare provider about vaccinations and whether you should get a yearly flu shot. There are now 2 different pneumonia vaccines. Both of these are needed if you have a chronic disease or fit into the higher risk category.      Get a flu shot every year as soon as it's available in your area. The flu shot helps prevent you from getting the flu and complications of the flu, such as pneumonia.    Get pneumococcal pneumonia vaccines. Talk with your healthcare provider about which pneumococcal vaccines are right for you.  Do suggested breathing  exercises  Deep breathing and coughing exercises can help clear your lungs. Your healthcare provider may suggest them. If so, you will be shown how to do them. Do them as often as your healthcare provider instructs.  Take care of your body    Drink at least 6 to 8 glasses of water a day.    Eat well-balanced, healthy meals.    Avoid drinking alcohol.    Don t smoke. Avoid places where people are smoking.    Moving around helps keep your lungs clear. Ask your healthcare provider what type of activity is best for you. Walking is often a good choice.    Get enough rest. Sleep at least 8 hours each night. Rest or nap during the day as needed.    Ask your healthcare provider when you should schedule follow-up care to make sure the infection is gone.  Date Last Reviewed: 12/1/2016 2000-2017 [employer]. [address] [place] [address], [place] [address]. All rights reserved. This information is not intended as a substitute for professional medical care. Always follow your healthcare professional's instructions.                Amoxicillin; Clavulanic Acid tablets  Brand Name: Augmentin  What is this medicine?  AMOXICILLIN; CLAVULANIC ACID (a mox i JORGE A in; NILS garcia ic AS id) is a penicillin antibiotic. It is used to treat certain kinds of bacterial infections. It will not work for colds, flu, or other viral infections.  How should I use this medicine?  Take this medicine by mouth with a full glass of water. Follow the directions on the prescription label. Take at the start of a meal. Do not crush or chew. If the tablet has a score line, you may cut it in half at the score line for easier swallowing. Take your medicine at regular intervals. Do not take your medicine more often than directed. Take all of your medicine as directed even if you think you are better. Do not skip doses or stop your medicine early.  Talk to your pediatrician regarding the use of this medicine in children. Special care may be  needed.  What side effects may I notice from receiving this medicine?  Side effects that you should report to your doctor or health care professional as soon as possible:    allergic reactions like skin rash, itching or hives, swelling of the face, lips, or tongue    breathing problems    dark urine    fever or chills, sore throat    redness, blistering, peeling or loosening of the skin, including inside the mouth    seizures    trouble passing urine or change in the amount of urine    unusual bleeding, bruising    unusually weak or tired    white patches or sores in the mouth or throat  Side effects that usually do not require medical attention (report to your doctor or health care professional if they continue or are bothersome):    diarrhea    dizziness    headache    nausea, vomiting    stomach upset    vaginal or anal irritation  What may interact with this medicine?    allopurinol    anticoagulants    birth control pills    methotrexate    probenecid    What if I miss a dose?  If you miss a dose, take it as soon as you can. If it is almost time for your next dose, take only that dose. Do not take double or extra doses.  Where should I keep my medicine?  Keep out of the reach of children.  Store at room temperature below 25 degrees C (77 degrees F). Keep container tightly closed. Throw away any unused medicine after the expiration date.  What should I tell my health care provider before I take this medicine?  They need to know if you have any of these conditions:    bowel disease, like colitis    kidney disease    liver disease    mononucleosis    an unusual or allergic reaction to amoxicillin, penicillin, cephalosporin, other antibiotics, clavulanic acid, other medicines, foods, dyes, or preservatives    pregnant or trying to get pregnant    breast-feeding  What should I watch for while using this medicine?  Tell your doctor or health care professional if your symptoms do not improve.  Do not treat diarrhea  with over the counter products. Contact your doctor if you have diarrhea that lasts more than 2 days or if it is severe and watery.  If you have diabetes, you may get a false-positive result for sugar in your urine. Check with your doctor or health care professional.  Birth control pills may not work properly while you are taking this medicine. Talk to your doctor about using an extra method of birth control.  NOTE:This sheet is a summary. It may not cover all possible information. If you have questions about this medicine, talk to your doctor, pharmacist, or health care provider. Copyright  2018 [employer]

## 2018-04-18 NOTE — PHARMACY-VANCOMYCIN DOSING SERVICE
Pharmacy consulted dose dose Vancomycin for skin and soft tissue infection.  Will dose Vancomycin 1250 mg IV x1. If patient is admitted and provider wishes to continue Vancomycin therapy, please order an inpatient pharmacy consult

## 2018-04-18 NOTE — ED NOTES
DATE:  4/18/2018   TIME OF RECEIPT FROM LAB:  1737  LAB TEST:  lactic  LAB VALUE:  3.0  RESULTS GIVEN WITH READ-BACK TO (PROVIDER):  MARK Zayas  TIME LAB VALUE REPORTED TO PROVIDER:  7783

## 2018-04-18 NOTE — ED NOTES
Bed: ED03  Expected date:   Expected time:   Means of arrival:   Comments:  [facility] [place]

## 2018-04-18 NOTE — TELEPHONE ENCOUNTER
Spoke with Guardian Donna, Patient needs to be sent to ER. Will be sent via ambulance today 4/18/2018

## 2018-04-18 NOTE — IP AVS SNAPSHOT
14 Intensive Care Unit    [address] [place] [id number]    Phone:  [phone]    Fax:  [phone]                                       After Visit Summary   4/18/2018    Toi Cowart    MRN: [id number]           After Visit Summary Signature Page     I have received my discharge instructions, and my questions have been answered. I have discussed any challenges I see with this plan with the nurse or doctor.    ..........................................................................................................................................  Patient/Patient Representative Signature      ..........................................................................................................................................  Patient Representative Print Name and Relationship to Patient    ..................................................               ................................................  Date                                            Time    ..........................................................................................................................................  Reviewed by Signature/Title    ...................................................              ..............................................  Date                                                            Time

## 2018-04-18 NOTE — IP AVS SNAPSHOT
Toi Cowart #[id number] (CSN: [id number])  (87 year old M)  (Adm: 18)     [id number]               14 INTENSIVE CARE UNIT: [phone]            Patient Demographics     Patient Name Sex          Age SSN Address Phone    Toi Cowart Male 1930 (87 year old) [id number]   [place] [address] [phone] (Home) *Preferred*      Emergency Contact(s)     Name Relation Home Work Mobile    Linette Cowart Spouse [phone]      Naila Mao  Daughter [phone]      Admission Information     Attending Provider Admitting Provider Admission Type Admission Date/Time    Damir Raymond MD  Emergency 18  1659    Discharge Date Hospital Service Auth/Cert Status Service Area     General Medicine Incomplete [facility] HE[facility] SERVICES    Unit Room/Bed Admission Status       HI /312-1 Admission (Confirmed)       Admission     Complaint    HCAP (healthcare-associated pneumonia)      Hospital Account     Name Acct ID Class Status Primary Coverage    Toi Cowart [id number] Inpatient Open BLUE PLUS - BLUE PLUS SECURE BLUE            Guarantor Account (for Hospital Account #[id number])     Name Relation to Pt Service Area Active? Acct Type    OpheliaToi fang Self RANGE Yes Personal/Family    Address Phone          [address] [place] [address] [phone](H)              Coverage Information (for Hospital Account #[id number])     F/O Payor/Plan Precert #    BLUE PLUS/BLUE PLUS SECURE BLUE     Subscriber Subscriber #    Toi Cowart [id number]    Address Phone    [address] [address]  [place] [address] [phone]                                                INTERAGENCY TRANSFER FORM - PHYSICIAN ORDERS   2018                       14 INTENSIVE CARE UNIT: [phone]            Attending Provider: Damir Raymond MD     Allergies:  Contrast Dye, Fluvastatin Sodium, Lovastatin, No Clinical Screening - See  "Comments    Infection:  None   Service:  GENERAL [facility]    Ht:  1.702 m (5' 7\")   Wt:  82.8 kg (182 lb 8.7 oz)   Admission Wt:  83.2 kg (183 lb 6.8 oz)    BMI:  28.59 kg/m 2   BSA:  1.98 m 2            ED Clinical Impression     Diagnosis Description Comment Added By Time Added    Healthcare-associated pneumonia [J18.9] Healthcare-associated pneumonia [J18.9]  Chana Arevalo PA-C 4/18/2018  6:11 PM      Hospital Problems as of 4/20/2018              Priority Class Noted POA    Hypercholesteremia Medium  8/30/2011 Yes    Cardiac pacemaker, [employer]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, Dual chamber - DEPENDENT Medium  8/14/2012 Yes    GERD (gastroesophageal reflux disease) Medium  4/1/2013 Yes    Essential hypertension Medium  4/1/2013 Yes    CAD (coronary artery disease) Medium  4/1/2013 Yes    * (Principal)HCAP (healthcare-associated pneumonia) Medium  4/18/2018 Unknown      Non-Hospital Problems as of 4/20/2018              Priority Class Noted    Bilateral inguinal hernia Medium  11/6/2003    Dementia with behavioral disturbance Medium  1/1/2011    Osteoarthritis Medium  1/1/2011    Sinoatrial node dysfunction (H) Medium  4/23/2012    ACP (advance care planning) Medium  9/21/2012    Fatigue Medium  4/1/2013    Sleep disorder Medium  4/1/2013    Aspiration pneumonia (H) Medium  7/14/2016      Code Status History     Date Active Date Inactive Code Status Order ID Comments User Context    4/20/2018 10:04 AM  DNR/DNI [id number]  Damir Raymond MD Outpatient    4/18/2018  7:37 PM 4/20/2018 10:04 AM DNR/DNI [id number]  Vladimir Lino MD Inpatient    7/27/2016  9:39 AM 4/18/2018  7:37 PM DNR/DNI [id number]  Sherita Ling MD Outpatient    7/14/2016  1:12 AM 7/27/2016  9:39 AM DNR/DNI [id number]  Emmie Denise MD Inpatient      Current Code Status     Date Active Code Status Order ID Comments User Context       Prior      Summary of Visit     Reason for your hospital stay       Toi Cowart is a 87 year old  man who was admitted on 4/18/2018.  He " presented from [facility] where fever and increased confusion was noted.  History significant for established dementia, HTN, CAD, SA node dysfunction with PPM and battery change 4/16/2018.  He showed mild tachypnea on presentation without significant hypoxemia.  He responded to usual therapy including antibiotics and supportive measures.  Diagnostic considerations include infectious pneumonia versus noninfectious aspiration pneumonitis.  Mental status at the time of discharge likely nearly at his baseline.  Discharge to complete a course of Augmentin.                Medication Review      START taking        Dose / Directions Comments    amoxicillin-clavulanate 875-125 MG per tablet   Commonly known as:  AUGMENTIN   Indication:  Healthcare-Associated Pneumonia, ?aspiration pneumonitis   Used for:  Healthcare-associated pneumonia, Aspiration pneumonia of both lungs, unspecified aspiration pneumonia type, unspecified part of lung (H)        Dose:  1 tablet   Take 1 tablet by mouth every 12 hours   Quantity:  10 tablet   Refills:  0          CONTINUE these medications which have NOT CHANGED        Dose / Directions Comments    CALMOSEPTINE 0.44-20.6 % Oint   Used for:  Skin irritation   Generic drug:  Menthol-Zinc Oxide        USE AS NEEDED.   Quantity:  113 g   Refills:  5        divalproex sodium delayed-release 125 MG DR capsule   Commonly known as:  DEPAKOTE SPRINKLE   Used for:  Dementia due to medical condition without behavioral disturbance        TAKE (1) CAPSULE THREE TIMES DAILY.   Quantity:  84 capsule   Refills:  0        gentamicin 0.3 % ophthalmic solution   Commonly known as:  GARAMYCIN        Dose:  1 drop   Place 1 drop into both eyes 3 times daily   Refills:  0        HYDROcodone-acetaminophen 5-325 MG per tablet   Commonly known as:  NORCO        Dose:  1 tablet   Take 1 tablet by mouth 2 times daily as needed for severe pain   Refills:  0        loperamide 2 MG capsule   Commonly known as:   IMODIUM   Used for:  Diarrhea        TAKE 1 CAPSULE FOUR TIMES DAILY FOR DIARRHEA AS NEEDED   Quantity:  120 capsule   Refills:  0        nystatin [id number] UNIT/GM Powd   Commonly known as:  MYCOSTATIN   Used for:  Yeast infection of the skin        Apply topically 2 times daily Apply to groin area twice daily.   Quantity:  60 g   Refills:  0        omeprazole 20 MG CR capsule   Commonly known as:  priLOSEC   Used for:  Gastroesophageal reflux disease without esophagitis        TAKE 1 CAPSULE DAILY ON EMPTY STOMACH.   Quantity:  28 capsule   Refills:  11        psyllium Packet   Commonly known as:  METAMUCIL/KONSYL        Dose:  1 packet   Take 1 packet by mouth daily   Refills:  0        Rectal Barrier   Used for:  Generalized muscle weakness        Apply to affected areas as needed daily   Quantity:  90 g   Refills:  0        SB IBUPROFEN 200 MG tablet   Used for:  Pain   Generic drug:  ibuprofen        TAKE 1 TABLET EVERY 4 HOURS AS NEEDED FOR PAIN OR FEVER   Quantity:  100 tablet   Refills:  5                After Care     Activity - Up with nursing assistance           Additional Discharge Instructions       Aspiration precautions       Advance Diet as Tolerated       Follow this diet upon discharge: Orders Placed This Encounter      Regular Diet Adult       Fall precautions           General info for SNF       Length of Stay Estimate: Long Term Care  Condition at Discharge: Improving  Level of care:board and care  Rehabilitation Potential: Fair  Admission H&P remains valid and up-to-date: Yes  Recent Chemotherapy: N/A  Use Nursing Home Standing Orders: Yes       Mantoux instructions       Give two-step Mantoux (PPD) Per Facility Policy Yes             Radiology & Cardiology Orders     XR Chest 2 Views                 Referrals     Speech Language Path Adult Consult       Evaluate and treat as clinically indicated.    Reason:  Please evaluate swallowing function and aspiration risk             Follow-Up  "Appointment Instructions     Follow Up and recommended labs and tests       Follow up with Nursing home physician.    CXR requested in 4-6 weeks             Statement of Approval     Ordered          04/20/18 1007  I have reviewed and agree with all the recommendations and orders detailed in this document.  EFFECTIVE NOW     Approved and electronically signed by:  Damir Raymond MD                                                 INTERAGENCY TRANSFER FORM - NURSING   4/18/2018                       14 INTENSIVE CARE UNIT: [phone]            Attending Provider: Damir Raymond MD     Allergies:  Contrast Dye, Fluvastatin Sodium, Lovastatin, No Clinical Screening - See Comments    Infection:  None   Service:  GENERAL MEDI    Ht:  1.702 m (5' 7\")   Wt:  82.8 kg (182 lb 8.7 oz)   Admission Wt:  83.2 kg (183 lb 6.8 oz)    BMI:  28.59 kg/m 2   BSA:  1.98 m 2            Advance Directives        Scanned docmt in ACP Activity?           Yes, scanned ACP docmt        Immunizations     Name Date      Influenza (High Dose) 3 valent vaccine 09/21/12     Influenza (IIV3) PF 10/20/08     Influenza (IIV3) PF 10/19/07     Pneumococcal 23 valent 07/06/10     Pneumococcal 23 valent 02/18/03       ASSESSMENT     Discharge Profile Flowsheet     DISCHARGE NEEDS ASSESSMENT     Resources List  Skilled Nursing Facility;Other (Comments) (Return to [facility]) 04/19/18 1346    Concerns To Be Addressed  basic needs concerns;care coordination/care conferences 07/27/16 1338   Other Resources  Transportation Services 04/19/18 1346    Concerns Comments  hopsice and pt care. 07/27/16 1338   PAS Number  [id number] 07/27/16 1338    Equipment Currently Used at Home  walker, standard;shower chair 04/19/18 1346   SKIN      Transportation Available  agency transportation 04/19/18 1346   Inspection of bony prominences  Full 04/20/18 1059    # of Referrals Placed by [facility]  Hospice;Post Acute Facilities;Transportation 07/27/16 1338   " "Inspection under devices  Full 04/20/18 1059    Key Recommendations  NH with hospice 07/27/16 1338   Skin WDL  ex 04/20/18 1059    Primary Care Clinic Name  [facility] 04/19/18 1346   Skin Color/Characteristics  pale;noemi;bruised (ecchymotic) 04/20/18 1059    Primary Care MD Name  Dr. Bell 04/19/18 1346   Skin Temperature  warm 04/20/18 1059    GASTROINTESTINAL (ADULT,PEDIATRIC,OB)     Skin Moisture  dry 04/20/18 1059    GI WDL  WDL 04/20/18 1059   Skin Elasticity  quick return to original state 04/20/18 1059    Last Bowel Movement  04/20/18 04/20/18 1059   Skin Integrity  bruise(s) 04/20/18 1059    Passing flatus  yes 04/20/18 1059   SAFETY      COMMUNICATION ASSESSMENT     Safety WDL  WDL 04/20/18 0154    Patient's communication style  spoken language (English or Bilingual) 04/18/18 1947   All Alarms  alarm(s) activated and audible 04/20/18 1125    FINAL RESOURCES                        Assessment WDL (Within Defined Limits) Definitions           Safety WDL     Effective: 09/28/15    Row Information: <b>WDL Definition:</b> Bed in low position, wheels locked; call light in reach; upper side rails up x 2; ID band on<br> <font color=\"gray\"><i>Item=AS safety wdl>>List=AS safety wdl>>Version=F14</i></font>      Skin WDL     Effective: 09/28/15    Row Information: <b>WDL Definition:</b> Warm; dry; intact; elastic; without discoloration; pressure points without redness<br> <font color=\"gray\"><i>Item=AS skin wdl>>List=AS skin wdl>>Version=F14</i></font>      Vitals     Vital Signs Flowsheet     VITAL SIGNS     Score: FLACC (rest)  5 04/20/18 1049    Temp  98.1  F (36.7  C) 04/20/18 1047   ANALGESIA SIDE EFFECTS MONITORING      Temp src  Temporal 04/20/18 0151   Side Effects Monitoring: Respiratory Quality  R 04/20/18 1049    Resp  18 04/20/18 1047   Side Effects Monitoring: Respiratory Depth  N 04/20/18 1049    Heart Rate  71 04/20/18 1044   Side Effects Monitoring: Sedation Level  S 04/20/18 1049 " "   Pulse/Heart Rate Source  Monitor 04/19/18 0954   HEIGHT AND WEIGHT      BP  165/74 04/20/18 1044   Height  1.702 m (5' 7\") 04/18/18 1753    BP Location  Left arm 04/20/18 0151   Height Method  Actual 04/18/18 1706    OXYGEN THERAPY     Weight  82.8 kg (182 lb 8.7 oz) 04/20/18 0640    SpO2  97 % 04/20/18 1045   Weight Method  Bed scale 04/20/18 0640    O2 Device  None (Room air) 04/20/18 1047   POSITIONING      PAIN/COMFORT     Body Position  turned 04/20/18 1125    Patient Currently in Pain  yes 04/20/18 1047   Head of Bed (HOB)  HOB at 15 degrees 04/20/18 1022    Preferred Pain Scale  FLACC (Face Legs Arms Cry Consolability Scale) 04/20/18 1047   Positioning/Transfer Devices  pillows;in use 04/20/18 1125    Patient's Stated Pain Goal  Unable to Assess 04/20/18 0151   Chair  Upright in chair 04/20/18 1125    Word Pain Scale  4 04/19/18 0354   VICENTE COMA SCALE      Pain Location  Generalized 04/19/18 0354   Best Eye Response  4-->(E4) spontaneous 04/20/18 0151    Pain Intervention(s)  Medication (See eMAR) 04/20/18 1047   Best Motor Response  6-->(M6) obeys commands 04/20/18 0151    Response to Interventions  Absence of nonverbal indicators of pain 04/20/18 1049   Best Verbal Response  5-->(V5) oriented 04/20/18 0151    PAIN ASSESSMENT/FLACC     Vicente Coma Scale Score  15 04/20/18 0151    Pain Rating: FLACC (rest) - Face  1 04/20/18 1049   DAILY CARE      Pain Rating: FLACC (rest) - Legs  1 04/20/18 1049   Activity Management  activity adjusted per tolerance;up in chair 04/20/18 1125    Pain Rating: FLACC (rest) - Activity  1 04/20/18 1049   Activity Assistance Provided  assistance, 2 people 04/20/18 1125    Pain Rating: FLACC (rest) - Cry  1 04/20/18 1049   Assistive Device Utilized  gait belt 04/20/18 1125    Pain Rating: FLACC (rest) - Consolability  1 04/20/18 1049                 Patient Lines/Drains/Airways Status    Active LINES/DRAINS/AIRWAYS     Name: Placement date: Placement time: Site: Days: Last " dressing change:    Wound 03/25/13 03/25/13         1852     Wound Tibial Abrasion(s);Shear injury Two large blisters to left shin with abrasions       Tibial        Wound 07/16/16 Mid Scrotum Skin tear Pt scatched open area to scrotum 07/16/16   0800   Scrotum   643     Incision/Surgical Site 04/16/18 Left Chest 04/16/18   0823    4             Patient Lines/Drains/Airways Status    Active PICC/CVC     None            Intake/Output Detail Report     Date Intake     Output    Shift P.O. I.V. IV Piggyback Total Total       Noc 04/18/18 2300 - 04/19/18 0659 -- 1860 -- 1860 -- 1860    Day 04/19/18 0700 - 04/19/18 1459 -- 291 -- 291 -- 291    Rossana 04/19/18 1500 - 04/19/18 2259 240 -- -- 240 -- 240    Noc 04/19/18 2300 - 04/20/18 0659 -- -- -- -- -- 0    Day 04/20/18 0700 - 04/20/18 1459 200 -- -- 200 -- 200      Last Void/BM       Most Recent Value    Urine Occurrence 1 at 04/20/2018 0145    Stool Occurrence 1 at 04/20/2018 1125      Case Management/Discharge Planning     Case Management/Discharge Planning Flowsheet     REFERRAL INFORMATION     ASSESSMENT/CONCERNS TO BE ADDRESSED      Did the Initial Social Work Assessment result in a Social Work Case?  Yes 04/19/18 1346   Concerns To Be Addressed  basic needs concerns;care coordination/care conferences 07/27/16 1338    # of Referrals Placed by [facility]  Hospice;Post Acute Facilities;Transportation 07/27/16 1338   Concerns Comments  hopsice and pt care. 07/27/16 1338    Reason For Consult  discharge planning 04/19/18 1346   DISCHARGE PLANNING      [facility] Clinic Name  [facility] 04/19/18 1346   Transportation Available  agency transportation 04/19/18 1346    Primary Care MD Name  Dr. Bell 04/19/18 1346   Key Recommendations  NH with hospice 07/27/16 1338    LIVING ENVIRONMENT     FINAL RESOURCES      Lives With  facility resident 04/19/18 1346   Equipment Currently Used at Home  walker, standard;shower chair 04/19/18 1346    Living Arrangements   extended care facility 04/19/18 1346   Resources List  Skilled Nursing Facility;Other (Comments) (Return to [facility]) 04/19/18 1346    Able to Return to Prior Living Arrangements  other (see comments) ([facility], [facility] to return there) 04/19/18 1346   Other Resources  Transportation Services 04/19/18 1346    HOME SAFETY     [facility] Number  [id number] 07/27/16 1338    Patient Feels Safe Living in Home?  yes 04/19/18 1346   ABUSE RISK SCREEN      ASSESSMENT OF FAMILY/SOCIAL SUPPORT     QUESTION TO PATIENT:  Has a member of your family or a partner(now or in the past) intimidated, hurt, manipulated, or controlled you in any way?  no 04/18/18 1942    Marital Status   04/19/18 1346   QUESTION TO PATIENT: Do you feel safe going back to the place where you are living?  patient declined to answer or is unable to answer 04/18/18 1710    Description of Support System  Supportive;Involved 04/19/18 1346   OBSERVATION: Is there reason to believe there has been maltreatment of a vulnerable adult (ie. Physical/Sexual/Emotional abuse, self neglect, lack of adequate food, shelter, medical care, or financial exploitation)?  no 04/18/18 1710    Support Assessment  Adequate family and caregiver support;Patient communicates needs well met 04/19/18 1346   OTHER      COPING/STRESS     Are you depressed or being treated for depression?  No 04/18/18 1942    Major Change/Loss/Stressor  denies 04/18/18 1947                       14 INTENSIVE CARE UNIT: [phone]            Medication Administration Report for Toi Cowart as of 04/20/18 1131   Legend:    Given Hold Not Given Due Canceled Entry Other Actions    Time Time (Time) Time  Time-Action       Inactive    Active    Linked        Medications 04/14/18 04/15/18 04/16/18 04/17/18 04/18/18 04/19/18 04/20/18    acetaminophen (TYLENOL) tablet 650 mg  Dose: 650 mg  Freq: EVERY 4 HOURS PRN Route: PO  PRN Reason: mild pain  Start: 04/18/18 1937   Admin  Instructions: Alternate ibuprofen (if ordered) with acetaminophen.  Maximum acetaminophen dose from all sources = 75 mg/kg/day not to exceed 4 grams/day.    Admin. Amount: 2 tablet (2 × 325 mg tablet)  Dispense Loc: [facility]               amoxicillin-clavulanate (AUGMENTIN) 875-125 MG per tablet 1 tablet  Dose: 1 tablet  Freq: EVERY 12 HOURS SCHEDULED Route: PO  Indications of Use: HEALTHCARE-ASSOCIATED PNEUMONIA  Indications Comment: ?aspiration pneumonitis  Start: 18 08   End: 18 0759   Admin. Amount: 1 tablet  Last Admin: 1850  Dispense Loc: [facility]  Administrations Remainin950 (1 tablet)-Given       [ ]            divalproex sodium delayed-release (DEPAKOTE SPRINKLE) DR capsule 125 mg  Dose: 125 mg  Freq: EVERY 8 HOURS SCHEDULED Route: PO  Start: 18 08   Admin. Amount: 1 capsule (1 × 125 mg capsule)  Last Admin: 18  Dispense Loc: [facility]          (0941)-Not Given       0948 (125 mg)-Given       1311 (125 mg)-Given       2152 (125 mg)-Given        0951 (125 mg)-Given       [ ] 1400       [ ] 2200           enoxaparin (LOVENOX) injection 40 mg  Dose: 40 mg  Freq: EVERY 24 HOURS Route: SC  Start: 18   Admin Instructions: HOLD if platelet count falls below 50% of baseline or less than 100,000/ L and notify provider.    Admin. Amount: 40 mg = 0.4 mL Conc: 40 mg/0.4 mL  Last Admin: 18  Dispense Loc: [facility]  Volume: 0.4 mL          (40 mg)-Given        ()-Not Given        [ ]            gentamicin (GARAMYCIN) 0.3 % ophthalmic solution 1 drop  Dose: 1 drop  Freq: 3 TIMES DAILY Route: Both Eyes  Start: 18   Admin. Amount: 1 drop  Last Admin: 18  Dispense Loc: [facility]  Volume: 5 mL          (0949)-Not Given       1314 (1 drop)-Given       2154 (1 drop)-Given        1127 (1 drop)-Given       [ ] 1400       [ ] 2100           HYDROmorphone (PF) (DILAUDID) injection 0.3-0.5 mg  Dose: 0.3-0.5  mg  Freq: EVERY 2 HOURS PRN Route: IV  PRN Reason: other  PRN Comment: for pain control or improvement in physical function.  Hold dose for analgesic side effects.  Start: 04/18/18 1937   Admin Instructions: Start at the lowest dose.  May adjust dose by 0.1 mg every 2 hours as needed.  Notify provider to assess for uncontrolled pain or analgesic side effects. Hold while on PCA or with regular IV opioid dosing  For ordered doses up to 4 mg give IV Push undiluted. Administer each 2mg over 2-5 minutes.    Admin. Amount: 0.3-0.5 mg  Last Admin: 04/18/18 2345  Dispense Loc: [facility]         2028 (0.5 mg)-Given       2345 (0.5 mg)-Given             melatonin tablet 1 mg  Dose: 1 mg  Freq: AT BEDTIME PRN Route: PO  PRN Reason: sleep  Start: 04/18/18 1937   Admin Instructions: Do not give unless at least 6 hours of uninterrupted sleep is expected.    Admin. Amount: 1 tablet (1 × 1 mg tablet)  Dispense Loc: [facility]               naloxone (NARCAN) injection 0.1-0.4 mg  Dose: 0.1-0.4 mg  Freq: EVERY 2 MIN PRN Route: IV  PRN Reason: opioid reversal  Start: 04/18/18 1937   Admin Instructions: For respiratory rate LESS than or EQUAL to 8.  Partial reversal dose:  0.1 mg titrated q 2 minutes for Analgesia Side Effects Monitoring Sedation Level of 3 (frequently drowsy, arousable, drifts to sleep during conversation).Full reversal dose:  0.4 mg bolus for Analgesia Side Effects Monitoring Sedation Level of 4 (somnolent, minimal or no response to stimulation).  For ordered doses up to 2mg give IVP. Give each 0.4mg over 15 seconds in emergency situations. For non-emergent situations further dilute in 9mL of NS to facilitate titration of response.    Admin. Amount: 0.1-0.4 mg = 0.25-1 mL Conc: 0.4 mg/mL  Dispense Loc: [facility]  Volume: 1 mL               omeprazole (priLOSEC) CR capsule 20 mg  Dose: 20 mg  Freq: EVERY MORNING BEFORE BREAKFAST Route: PO  Start: 04/19/18 1045   Admin. Amount: 1 capsule (1 × 20 mg capsule)  Last Admin:  04/20/18 0950  Dispense Loc: [facility]          (1046)-Not Given [C]        0950 (20 mg)-Given           ondansetron (ZOFRAN-ODT) ODT tab 4 mg  Dose: 4 mg  Freq: EVERY 6 HOURS PRN Route: PO  PRN Reasons: nausea,vomiting  Start: 04/18/18 1937   Admin Instructions: This is Step 1 of nausea and vomiting management.  If nausea not resolved in 15 minutes, go to Step 2 prochlorperazine (COMPAZINE). Do not push through foil backing. Peel back foil and gently remove. Place on tongue immediately. Administration with liquid unnecessary  With dry hands, peel back foil backing and gently remove tablet; do not push oral disintegrating tablet through foil backing; administer immediately on tongue and oral disintegrating tablet dissolves in seconds; then swallow with saliva; liquid not required.    Admin. Amount: 1 tablet (1 × 4 mg tablet)  Dispense Loc: [facility]              Or  ondansetron (ZOFRAN) injection 4 mg  Dose: 4 mg  Freq: EVERY 6 HOURS PRN Route: IV  PRN Reasons: nausea,vomiting  Start: 04/18/18 1937   Admin Instructions: This is Step 1 of nausea and vomiting management.  If nausea not resolved in 15 minutes, go to Step 2 prochlorperazine (COMPAZINE).  Irritant. For ordered doses up to 4 mg, give IV Push undiluted over 2-5 minutes.    Admin. Amount: 4 mg = 2 mL Conc: 4 mg/2 mL  Dispense Loc: [facility]  Infused Over: 2-5 Minutes  Volume: 2 mL               oxyCODONE IR (ROXICODONE) tablet 5-10 mg  Dose: 5-10 mg  Freq: EVERY 3 HOURS PRN Route: PO  PRN Reason: other  PRN Comment: pain control or improvement in physical function. Hold dose for analgesic side effects.  Start: 04/18/18 1937   Admin Instructions: Start with the lowest dose.  May adjust dose by 5 mg every 3 hours as needed. Notify provider to assess for uncontrolled pain or analgesic side effects. Hold while on PCA or with regular IV opioid dosing.    Admin. Amount: 1-2 tablet (1-2 × 5 mg tablet)  Last Admin: 04/20/18 0950  Dispense Loc: [facility]         2028 (5  mg)-Given        0455 (5 mg)-Given       2255 (5 mg)-Given        0950 (5 mg)-Given           prochlorperazine (COMPAZINE) injection 5 mg  Dose: 5 mg  Freq: EVERY 6 HOURS PRN Route: IV  PRN Reasons: nausea,vomiting  Start: 04/18/18 1937   Admin Instructions: This is Step 2 of nausea and vomiting management. Give if nausea not resolved 15 minutes after giving ondansetron (ZOFRAN). If nausea not resolved in 15 minutes, go to Step 3 metoclopramide (REGLAN), if ordered.  For ordered doses up to 10 mg, give IV Push undiluted. Each 5mg over 1 minute.    Admin. Amount: 5 mg = 1 mL Conc: 5 mg/mL  Dispense Loc: [facility]  Infused Over: 1-2 Minutes  Volume: 1 mL              Or  prochlorperazine (COMPAZINE) tablet 5 mg  Dose: 5 mg  Freq: EVERY 6 HOURS PRN Route: PO  PRN Reason: vomiting  Start: 04/18/18 1937   Admin Instructions: This is Step 2 of nausea and vomiting management. Give if nausea not resolved 15 minutes after giving ondansetron (ZOFRAN). If nausea not resolved in 15 minutes, go to Step 3 metoclopramide (REGLAN), if ordered.    Admin. Amount: 1 tablet (1 × 5 mg tablet)  Dispense Loc: [facility]              Or  prochlorperazine (COMPAZINE) Suppository 12.5 mg  Dose: 12.5 mg  Freq: EVERY 12 HOURS PRN Route: RE  PRN Reasons: nausea,vomiting  Start: 04/18/18 1937   Admin Instructions: This is Step 2 of nausea and vomiting management. Give if nausea not resolved 15 minutes after giving ondansetron (ZOFRAN). If nausea not resolved in 15 minutes, go to Step 3 metoclopramide (REGLAN), if ordered.    Admin. Amount: 0.5 suppository (0.5 × 25 mg suppository)  Dispense Loc: [facility]               psyllium (METAMUCIL/KONSYL) Packet 1 packet  Dose: 1 packet  Freq: DAILY Route: PO  Start: 04/19/18 0900   Admin Instructions: Powder should be diluted with fluid before given.    Admin. Amount: 1 packet  Last Admin: 04/20/18 0950  Dispense Loc: [facility]          0936 (1 packet)-Given        0950 (1 packet)-Given            senna-docusate (SENOKOT-S;PERICOLACE) 8.6-50 MG per tablet 1 tablet  Dose: 1 tablet  Freq: 2 TIMES DAILY Route: PO  Start: 18   Admin Instructions: If no bowel movement in 24 hours, increase to 2 tablets PO.  Hold for loose stools.    Admin. Amount: 1 tablet  Last Admin: 18  Dispense Loc: [facility]         ()-Not Given [C]                       0951 (1 tablet)-Given       [ ] 2100          Or  senna-docusate (SENOKOT-S;PERICOLACE) 8.6-50 MG per tablet 2 tablet  Dose: 2 tablet  Freq: 2 TIMES DAILY Route: PO  Start: 18   Admin Instructions: Hold for loose stools.    Admin. Amount: 2 tablet  Last Admin: 18  Dispense Loc: [facility]                 0937 (2 tablet)-Given        (2 tablet)-Given               [ ] 2100          Completed Medications  Medications 04/14/18 04/15/18 04/16/18 04/17/18 04/18/18 04/19/18 04/20/18         Dose: 975 mg  Freq: ONCE Route: PO  Start: 18   End: 18   Admin Instructions: Maximum acetaminophen dose from all sources = 75 mg/kg/day not to exceed 4 grams/day.    Admin. Amount: 3 tablet (3 × 325 mg tablet)  Last Admin: 18  [facility]  Administrations [facility]         1729 (975 mg)-Given               Dose: 1,000 mL  Freq: ONCE Route: IV  Last Dose: Stopped (18)  Start: 18   End: 18   Admin. Amount: 1,000 mL  Last Admin: 18  Dispense Loc: [facility]  Infused Over: 1 Hours  Administrations Remainin  Volume: 1,000 mL          (1,000 mL)-New Bag       -ED Infusing on Admission/transfer               Freq: ONCE Route: IV  Last Dose: Stopped (18)  Start: 18   End: 18   Last Admin: 04/18/18 1732  Dispense Loc: [facility]  Administrations Remainin  Volume: 1,000 mL         1732 ( )-New Bag       1837-Stopped               Dose: 4.5 g  Freq: ONCE Route: IV  Indications of Use: HEALTHCARE-ASSOCIATED  PNEUMONIA  Last Dose: Stopped (18)  Start: 18   End: 18   Admin. Amount: 4.5 g = 100 mL Conc: 4.5 g/100 mL  Last Admin: 18  Dispense Loc: [facility]  Infused Over: 30 Minutes  Administrations Remainin  Volume: 100 mL         1836 (4.5 g)-New Bag       -Stopped               Dose: 1,250 mg  Freq: ONCE Route: IV  Indications of Use: SKIN AND SOFT TISSUE INFECTION  Last Dose: Stopped (18)  Start: 18   End: 18   Admin Instructions: Vesicant.    For peripheral catheter: If dose between 2-2.5 g, infuse over 2 hours.    For central catheter: If dose is below 2 g, dose may be infused over 1 hour.    Admin. Amount: 1,250 mg  Last Admin: 18 1800  Dispense Loc: [facility]  Infused Over: 90 Minutes  Administrations Remainin  Volume: 288 mL   Mixture Administration Information:   Medication Type Amount   vancomycin 100 mg/mL SOLR Medications 1,250 mg   sodium chloride 0.9 % SOLN Base 250 mL                 1800 (1,250 mg)-New Bag       -ED Infusing on Admission/transfer            Discontinued Medications  Medications 04/14/18 04/15/18 04/16/18 04/17/18 04/18/18 04/19/18 04/20/18         Dose: 20 mg  Freq: EVERY 12 HOURS Route: IV  Last Dose: 20 mg (18)  Start: 18   End: 18   Admin. Amount: 20 mg = 50 mL Conc: 20 mg/50 mL  Last Admin: 18  Dispense Loc: [facility]  Infused Over: 15-30 Minutes  Volume: 50 mL          (20 mg)-New Bag        933 (20 mg)-New Bag       -Med Discontinued          Dose: 50 mcg  Freq: EVERY 1 HOUR PRN Route: IV  PRN Reason: moderate to severe pain  Start: 18   End: 18   Admin Instructions: For ordered doses up to 100 mcg give IV Push undiluted over a minimum of 3-5 minutes.    Admin. Amount: 50 mcg = 1 mL Conc: 50 mcg/mL  Dispense Loc: [facility]  Volume: 2 mL         1809-Med Discontinued           Dose: 3.375 g  Freq: EVERY 6 HOURS  Route: IV  Indications of Use: SEPSIS  Indications Comment: Hospital Acquired Pneumonia  Last Dose: 3.375 g (04/20/18 0529)  Start: 04/19/18 1200   End: 04/20/18 0721   Admin Instructions: FIRST DOSE STAT    Admin. Amount: 3.375 g = 50 mL Conc: 3.375 g/50 mL  Last Admin: 04/20/18 0529  Dispense Loc: [facility]  Infused Over: 30 Minutes  Volume: 50 mL          1208 (3.375 g)-New Bag       1748 (3.375 g)-New Bag       2349 (3.375 g)-New Bag        0529 (3.375 g)-New Bag       0721-Med Discontinued         Dose: 4.5 g  Freq: EVERY 6 HOURS Route: IV  Indications of Use: SEPSIS  Indications Comment: Hospital Acquired Pneumonia  Last Dose: 4.5 g (04/19/18 0504)  Start: 04/19/18 0000   End: 04/19/18 1034   Admin Instructions: FIRST DOSE STAT    Admin. Amount: 4.5 g = 100 mL Conc: 4.5 g/100 mL  Last Admin: 04/19/18 0504  Dispense Loc: [facility]  Infused Over: 30 Minutes  Volume: 100 mL         2334 (4.5 g)-New Bag        0504 (4.5 g)-New Bag       1034-Med Discontinued          Rate: 100 mL/hr   Freq: CONTINUOUS Route: IV  Last Dose: Stopped (04/19/18 1059)  Start: 04/18/18 1945   End: 04/19/18 1035   Last Admin: 04/18/18 2028  Dispense Loc: [facility]  Volume: 1,000 mL         2028 ( )-New Bag        1035-Med Discontinued  1059-Stopped              Dose: 1,250 mg  Freq: EVERY 18 HOURS Route: IV  Indications of Use: SEPSIS  Last Dose: Stopped (04/20/18 0623)  Start: 04/19/18 1200   End: 04/20/18 0622   Admin Instructions: Vesicant.    For peripheral catheter: If dose between 2-2.5 g, infuse over 2 hours.    For central catheter: If dose is below 2 g, dose may be infused over 1 hour.    Admin. Amount: 1,250 mg  Last Admin: 04/20/18 0606  Dispense Loc: [facility]  Infused Over: 90 Minutes  Volume: 288 mL   Mixture Administration Information:   Medication Type Amount   vancomycin 100 mg/mL SOLR Medications 1,250 mg   sodium chloride 0.9 % SOLN Base 250 mL                  1248 (1,250 mg)-New Bag         0606 (1,250 mg)-New Bag       0622-Med Discontinued  0623-Stopped        Medications 04/14/18 04/15/18 04/16/18 04/17/18 04/18/18 04/19/18 04/20/18               INTERAGENCY TRANSFER FORM - NOTES (H&P, Discharge Summary, Consults, Procedures, Therapies)   4/18/2018                       14 INTENSIVE CARE UNIT: [phone]               History & Physicals      H&P by Vladimir Lino MD at 4/18/2018  8:06 PM     Author:  Vladimir Lino MD Service:  Internal Medicine Author Type:  Physician    Filed:  4/19/2018  1:13 AM Date of Service:  4/18/2018  8:06 PM Creation Time:  4/18/2018  8:06 PM    Status:  Signed :  Vladimir Lino MD (Physician)         [facility]    History and Physical  Hospitalist       Date of Admission:  4/18/2018  Date of Service (when I saw the patient): 4/18/2018     Assessment & Plan   Toi Cowart is a 87 year old male presenting from [facility] with a history of dementia, HTN, CAD, SA node dysfunction s/p PPM and battery change[JS1.1] 4/1[JS1.2]6[JS1.1]/2018[JS1.2] who presented on 4/18/2018 with fever and increased confusion.  History is[JS1.1] very[JS1.3] limited due to his mental status.[JS1.1]  The patient had no complaints and looked comfortable eating an apple.[JS1.4]     In the ED he was found to have a[JS1.1] temp of 103, BP as low as 77/52,[JS1.4] WBC 9.0, HgB 15.4, plt 118, glu 118, BUN 14, Cr 0.84, CRP 19.7, Lactic acid 3.0, Procalcitonin 0.62, Influenza A/B ag negative.  CXR showed a RLL infiltrate.    He denied[JS1.1] all ROS questions and said  I ask too many questions.[JS1.4]    Principal Problem:    HCAP (healthcare-associated pneumonia)  Active Problems:    Cardiac pacemaker, Medtronic, Dual chamber - DEPENDENT    GERD (gastroesophageal reflux disease)    Essential hypertension    CAD (coronary artery disease)    Hypercholesteremia[JS1.1]      Principal Problem:    HCAP (healthcare-associated pneumonia)[JS1.5]   - admit   - sepsis order  set   - broad spectrum abx   - sputum stain and cx if able   - f/u blood cx[JS1.4]    Active Problems:    Cardiac pacemaker, Medtronic, Dual chamber - DEPENDENT  Essential hypertension    CAD (coronary artery disease)    Hypercholesteremia[JS1.5]     - site looks OK without obvious infection   - not on any cardiac meds[JS1.4]      GERD (gastroesophageal reflux disease)[JS1.5]   - PPI[JS1.4]            DVT Prophylaxis:[JS1.1] Enoxaparin (Lovenox) SQ[JS1.4]  Code Status:[JS1.1] DNR / DNI[JS1.4]    Disposition: Expected discharge in[JS1.1] 2-4[JS1.4] days    Vladimir Lino    Primary Care Physician   [facility]    Chief Complaint   Chief Complaint   Patient presents with     Fever     Pacemaker battery changed recently. Fever started today.[JS1.1]           History is obtained from the patient  EMR[JS1.4]    History of Present Illness   Toi Cowart is a 87 year old male presenting from [facility] with a history of dementia, HTN, CAD, SA node dysfunction s/p PPM and battery change[JS1.1] 4/1[JS1.2]6[JS1.1]/2018[JS1.2] who presented on 4/18/2018 with fever and increased confusion.  History is[JS1.1] very[JS1.3] limited due to his mental status.[JS1.1]  The patient had no complaints and looked comfortable eating an apple.[JS1.4]    Past Medical History      Past Medical History:   Diagnosis Date     CAD (coronary artery disease) 4/1/2013    angioplasty, stent     Cardiac pacemaker, Medtronic, Dual chamber 8/14/2012     Cellulitis 3/25/13    Left anterior lower leg     Dementia 1/1/2011     Essential hypertension 4/1/2013     Fatigue 4/1/2013     GERD (gastroesophageal reflux disease) 4/1/2013     Hypercholesteremia 8/30/2011     Inguinal hernia without mention of obstruction or gangrene, bilateral, (not specified as recurrent) 11/6/2003     Osteoarthrosis 1/1/2011     Senile dementia 4/1/2013     Sinoatrial node dysfunction (H) 4/23/2012     Sleep disorder 4/1/2013     Thigh pain 1/1/2011       Past Surgical  History     Past Surgical History:   Procedure Laterality Date     C TOTAL HIP ARTHROPLASTY  2007    right     CATARACT IOL, RT/LT  2008    bilateral     Colon resection      colon cancer     COLONOSCOPY  2003     pace maker  2005    complete heart block; DDDR     REPLACE PACEMAKER GENERATOR N/A 4/16/2018    Procedure: REPLACE PACEMAKER GENERATOR;  Pacemaker Generator Change;  Surgeon: Heron Baker MD;  Location: [facility] OR     STENT  1994    angioplasty     STENT, CORONARY, JANINA         Prior to Admission Medications   Prior to Admission Medications   Prescriptions Last Dose Informant Patient Reported? Taking?   CALMOSEPTINE 0.44-20.6 % OINT   No Yes   Sig: USE AS NEEDED.   HYDROcodone-acetaminophen (NORCO) 5-325 MG per tablet Unknown at Unknown time  Yes No   Sig: Take 1 tablet by mouth 2 times daily as needed for severe pain   Rectal Barrier Unknown at Unknown time  No No   Sig: Apply to affected areas as needed daily   SB IBUPROFEN 200 MG tablet Unknown at Unknown time  No No   Sig: TAKE 1 TABLET EVERY 4 HOURS AS NEEDED FOR PAIN OR FEVER   divalproex (DEPAKOTE SPRINKLE) 125 MG CR capsule Unknown at Unknown time  No No   Sig: TAKE (1) CAPSULE THREE TIMES DAILY.   gentamicin (GARAMYCIN) 0.3 % ophthalmic solution Unknown at Unknown time  Yes No   Sig: Place 1 drop into both eyes every 4 hours   loperamide (IMODIUM) 2 MG capsule   No No   Sig: TAKE 1 CAPSULE FOUR TIMES DAILY FOR DIARRHEA AS NEEDED   nystatin (MYCOSTATIN) [id number] UNIT/GM POWD Unknown at Unknown time  No No   Sig: Apply topically 2 times daily Apply to groin area twice daily.   omeprazole (PRILOSEC) 20 MG CR capsule Unknown at Unknown time  No No   Sig: TAKE 1 CAPSULE DAILY ON EMPTY STOMACH.   psyllium (METAMUCIL/KONSYL) Packet Unknown at Unknown time  Yes No   Sig: Take 1 packet by mouth daily      Facility-Administered Medications: None     Allergies   Allergies   Allergen Reactions     Contrast Dye Unknown     IV dye, iodine containing contrast media      Fluvastatin Sodium Other (See Comments)     Lescol  Memory loss     Lovastatin Other (See Comments)     Memory loss     No Clinical Screening - See Comments      Iv dye       Social History   I have reviewed this patient's social history and updated it with pertinent information if needed. Toi Cowart  reports that he has quit smoking. His smoking use included Cigarettes. He does not have any smokeless tobacco history on file. He reports that he does not drink alcohol.    Family History      Family History   Problem Relation Age of Onset     Cancer - colorectal Father      Dementia Mother      DIABETES Maternal Aunt        Review of Systems   The 10 point Review of Systems is negative other than noted in the HPI or here.       Physical Exam   Temp: 98.8  F (37.1  C) Temp src: Oral BP: 96/58 (2nd liter LR started.)   Heart Rate: 93 Resp: 16 SpO2: 94 % O2 Device: None (Room air)    Vital Signs with Ranges  Temp:  [98.8  F (37.1  C)-103  F (39.4  C)] 98.8  F (37.1  C)  Heart Rate:  [] 93  Resp:  [16-24] 16  BP: ()/(52-87) 96/58  SpO2:  [94 %-97 %] 94 %  183 lbs 6.76 oz      Constitutional:  Awake, Alert, NAD[JS1.1] (was ill appearing in ED)[JS1.4]  Eyes:     no injection, no icterus  HEENT:   atraumatic, normocephalic  Respiratory:[JS1.1]   Rhonchi at right base[JS1.4]  Cardiovascular:[JS1.1]  Borderline tachy[JS1.4]; No JVD  GI:    soft, NT, ND, + bowel sounds  Skin:    no rashes, no lesions[JS1.1]; PPM site healing[JS1.4]  Musculoskeletal:  No edema, good tone, no deformities  Neurologic:   oriented x[JS1.1] 1[JS1.4], no focal deficits; EOMI  Psychiatric:   appropriate affect      Data   Data reviewed today:  I personally reviewed[JS1.1] CXR, labs[JS1.4]     Recent Labs  Lab 04/18/18  1710 04/12/18  1345   WBC 9.0 8.2   HGB 15.4 15.2   MCV 88 90   * 147*     --    POTASSIUM 3.9  --    CHLORIDE 102  --    CO2 24  --    BUN 14  --    CR 0.84  --    ANIONGAP 12  --    JOCELIN 9.1  --    GLC  118*  --    ALBUMIN 3.8  --    PROTTOTAL 7.4  --    BILITOTAL 0.8  --    ALKPHOS 62  --    ALT 14  --    AST 14  --        Recent Results (from the past 24 hour(s))   XR Chest Port 1 View    Narrative    PROCEDURE:  XR CHEST PORT 1 VW    HISTORY:  fever; .     COMPARISON:  7/18/2016    FINDINGS:   The cardiac silhouette is normal in size. The pulmonary vasculature is  normal.  There are airspace opacities in the right lower lung,  increased. There is mild left basilar atelectasis. No pleural effusion  or pneumothorax. Sided cardiac generator device is stable.      Impression    IMPRESSION:  Right basilar infiltrate.      MARIANELA MIRAMONTES MD[JS1.1]              Revision History        User Key Date/Time User Provider Type Action    > JS1.5 4/19/2018  1:13 AM Vladimir Lino MD Physician Sign     JS1.4 4/19/2018 12:51 AM Vladimir Lino MD Physician      JS1.3 4/19/2018 12:36 AM Vladimir Lino MD Physician      JS1.2 4/18/2018  8:10 PM Vladimir Lino MD Physician      JS1.1 4/18/2018  8:06 PM Vladimir Lino MD Physician                   Discharge Summaries     No notes of this type exist for this encounter.      Consult Notes     No notes of this type exist for this encounter.         Progress Notes - Physician (Notes for yesterday and today)      Progress Notes by Damir Raymond MD at 4/19/2018  1:49 PM     Author:  Damir Raymond MD Service:  Hospitalist Author Type:  Physician    Filed:  4/19/2018  2:09 PM Date of Service:  4/19/2018  1:49 PM Creation Time:  4/19/2018  1:49 PM    Status:  Signed :  Damir Raymond MD (Physician)         [facility]    Hospitalist Progress Note    Date of Service (when I saw the patient): 04/19/2018    Assessment & Plan   Toi Cowart is a 87 year old  man who was admitted on 4/18/2018.  He presented from [facility] where fever and increased confusion was noted.  History significant for a established dementia, HTN, CAD, SA node  dysfunction with PPM and battery change 4/16/2018.  Overall on initial evaluation he appeared quite comfortable.  The morning following admission he did note some dyspnea initially which he believes has fully resolved.  Mild tachypnea on presentation without significant hypoxemia.    1. Acute hypoxemic respiratory failure  Possible severe sepsis  Although has not required oxygen supplementation oxygenation is improved somewhat since his initial presentation.  He first showed some tachycardia and tachypnea consistent with respiratory failure improving relatively rapidly.  Please see below for considerations of pulmonary infection.  Felt to represent severe sepsis with a mild encephalopathy given confusion in the face of his established dementia as well as elevated lactate.  Lactate elevation may represent his tachypnea alone however.  Any established severe sepsis was transient and appears to be fully resolved.  Had planned to further lactate measurements to ensure clearance but intravenous access is been extremely limited and decided early this morning that the burden of multiple attempts at vascular access greater than any benefit.  At this point borderline respiratory failure most marked by intermittent tachypnea.  No significant respiratory effort.  2.  Pneumonia  In fact somewhat difficult to establish.  He had concerns for an aspiration pneumonitis in the past.  He does show a right lower lobe airspace infiltrate although this would be consistent with inflammatory noninfectious pneumonitis or an infectious process.  Pro-calcitonin has shown an increase since his presentation although this does not reliably differentiate between an infectious and inflammatory parenchymal infiltrate.  He is at some increased risk of resistant organisms as he is a resident in a nursing home.  He also recently had a pacemaker battery changed although the site looks excellent without evidence of any localized inflammation or  infection.  Significant fevers at least initially which again did not help distinguish between a noninfectious inflammatory than infectious process.  However as both a relatively broad spectrum of aerobic as well as anaerobic organisms and gram-positive skin organisms must be considered will continue piperacillin/tazobactam and vancomycin at least for the first 24 hours.  If cultures are unrevealing at that time would be inclined to discontinue vancomycin.  Given his rapid improvement a strong case could be made for an aspiration pneumonitis.  Unfortunately is unlikely that a definitive diagnosis will be likely.  3.  Permanent pacemaker placement  Recent battery change.  SA node dysfunction as indication for pacemaker placement.  4.  Established gastroesophageal reflux  Continue cyto-protection.  Resume oral PPI.  .  Coronary artery disease  No evidence of any active ischemia.  5.  Dementia  Suspect this is now at baseline.  As noted some interval acute confusion but now appears to have resolved.      Principal Problem:    HCAP (healthcare-associated pneumonia)  Active Problems:    Cardiac pacemaker, Medtronic, Dual chamber - DEPENDENT    GERD (gastroesophageal reflux disease)    Essential hypertension    CAD (coronary artery disease)    Hypercholesteremia      # Pain Assessment:  Current Pain Score 4/18/2018   Patient currently in pain? yes   Pain score (0-10) -   Pain location Generalized   Pain descriptors -   - Toi is experiencing pain due to generalized chronic pain. Pain management was discussed, although difficult to engage the patient given his underlying dementia.  Chronically treated with oral opiates.  Will continue this.  Acetaminophen as a part of multimodality treatment.  Minimize opiates as much as possible.       DVT Prophylaxis: Subcutaneous enoxaparin  Code Status: DNR/DNI    Disposition: Expected discharge in 2-3 days depending on his course.  Damir Tsang    Interval History   Awake and  alert.  Fluid speech although little response beyond response to direct questions were somewhat stereotyped response.  Oriented to person and generically to place.    -Data reviewed today: I reviewed all new labs and imaging results over the last 24 hours. I personally reviewed chest radiograph showing a right lower lobe airspace infiltrate.    Peripheral IV 04/18/18 Left (Active)   Site Assessment [facility] 4/18/2018  7:00 PM   Line Status Infusing 4/18/2018  7:00 PM   Phlebitis Scale 0-->no symptoms 4/18/2018  7:00 PM   Infiltration Scale 0 4/18/2018  7:00 PM   Number of days:1       Peripheral IV 04/18/18 Right Upper arm (Active)   Site Assessment [facility] 4/18/2018  7:00 PM   Line Status Infusing 4/18/2018  7:00 PM   Phlebitis Scale 0-->no symptoms 4/18/2018  7:00 PM   Infiltration Scale 0 4/18/2018  7:00 PM   Number of days:1       Wound 03/25/13 (Active)   Number of days:1851       Wound Tibial Abrasion(s);Shear injury Two large blisters to left shin with abrasions (Active)   Number of days:       Wound 07/16/16 Mid Scrotum Skin tear Pt scatched open area to scrotum (Active)   Number of days:642       Incision/Surgical Site 04/16/18 Left Chest (Active)   Number of days:3     Line/device assessment(s) completed for medical necessity    Physical Exam   Temp: 99.3  F (37.4  C) Temp src: Tympanic BP: 147/63   Heart Rate: 66 Resp: 20 SpO2: 98 % O2 Device: None (Room air)    Vitals:    04/18/18 1703 04/19/18 0548   Weight: 83.2 kg (183 lb 6.8 oz) 86.1 kg (189 lb 13.1 oz)     Vital Signs with Ranges  Temp:  [98.8  F (37.1  C)-103  F (39.4  C)] 99.3  F (37.4  C)  Heart Rate:  [] 66  Resp:  [16-24] 20  BP: ()/(50-95) 147/63  SpO2:  [92 %-98 %] 98 %  I/O last 3 completed shifts:  In: 3100 [P.O.:240; I.V.:2860]  Out: -     Awake, alert, concentration preserved   HEENT: Pupils equal, conjugate. No icterus or nystagmus. Oral mucosa moist. No facial asymmetry.   Neck: Supple, jugular veins not elevated. Trachea midline    Chest: No chest wall movement asymmetry. Aeration mildly diminished at bases. Accessory muscles not in use. Expiratory time not increased. No tidal wheezes.  Minimal wheeze on forced expiratory maneuver right upper lung fields.  Few rhonchi right base.  Few coarse crackles to right base.   Cardiac: PMI not displaced. S1, S2 unremarkable. No S3, S4. P2 not accentuated. No murmurs.   Abdomen: Soft. No palpation or percussion tenderness. No distention. Normoactive bowel sounds. Liver and spleen not increased in size. No bruits, masses, or pulsations.   Extremities: No lower extremity edema.  Extremities warm distally.  No eccymoses, clubbing.   Neurologic: Mental state above.  Moving all extremities.  Precise examination somewhat difficult.  Power diminished bilateral lower extremities. Tone preserved. No fasiculations or tremors.  DTR 2/4 and bilaterally equal.     Medications       divalproex sodium delayed-release  125 mg Oral Q8H MAURICIO     enoxaparin  40 mg Subcutaneous Q24H     gentamicin  1 drop Both Eyes TID     omeprazole  20 mg Oral QAM AC     piperacillin-tazobactam  3.375 g Intravenous Q6H     psyllium  1 packet Oral Daily     senna-docusate  1 tablet Oral BID    Or     senna-docusate  2 tablet Oral BID     vancomycin (VANCOCIN) IV  1,250 mg Intravenous Q18H           Data     Recent Labs  Lab 04/19/18  0446 04/18/18  1710   WBC 6.5 9.0   HGB 13.0* 15.4   MCV 90 88   * 118*    138   POTASSIUM 4.1 3.9   CHLORIDE 107 102   CO2 27 24   BUN 12 14   CR 0.83 0.84   ANIONGAP 9 12   JOCELIN 8.3* 9.1   GLC 91 118*   ALBUMIN  --  3.8   PROTTOTAL  --  7.4   BILITOTAL  --  0.8   ALKPHOS  --  62   ALT  --  14   AST  --  14       Lactic Acid   Date Value Ref Range Status   04/19/2018 2.7 (H) 0.4 - 2.0 mmol/L Final     Comment:     Significant value called to and read back by  DENTON MAN AT 0525 BY TF     04/18/2018 3.2 (H) 0.4 - 2.0 mmol/L Final     Comment:     Significant value called to and read back  by  DENTON MAN @ 2011 ON 04/18/2018 BY [facility]     04/18/2018 3.0 (H) 0.4 - 2.0 mmol/L Final     Comment:     Significant value called to and read back by  ARTHUR VILLEGAS @ 6528 ON 4/18/2018 BY [facility]         Recent Results (from the past 24 hour(s))   XR Chest Port 1 View    Narrative    PROCEDURE:  XR CHEST PORT 1 VW    HISTORY:  fever; .     COMPARISON:  7/18/2016    FINDINGS:   The cardiac silhouette is normal in size. The pulmonary vasculature is  normal.  There are airspace opacities in the right lower lung,  increased. There is mild left basilar atelectasis. No pleural effusion  or pneumothorax. Sided cardiac generator device is stable.      Impression    IMPRESSION:  Right basilar infiltrate.      MARIANELA MIRAMONTES MD[AM1.1]        Revision History        User Key Date/Time User Provider Type Action    > AM1.1 4/19/2018  2:09 PM Damir Raymond MD Physician Sign                  Procedure Notes     No notes of this type exist for this encounter.         Progress Notes - Therapies (Notes from 04/17/18 through 04/20/18)      Progress Notes by Estelle Scott PT at 4/19/2018  8:55 AM     Author:  Estelle Scott PT Service:  (none) Author Type:  Physical Therapist    Filed:  4/19/2018  8:56 AM Date of Service:  4/19/2018  8:55 AM Creation Time:  4/19/2018  8:55 AM    Status:  Signed :  Estelle Scott PT (Physical Therapist)         Per Dr.Mc Gonzalez client will go back to [facility] and no need for eval. WIll D/C this order. Estelle Ireland[TO1.1]     Revision History        User Key Date/Time User Provider Type Action    > TO1.1 4/19/2018  8:56 AM Estelle Scott, PT Physical Therapist Sign                                                      INTERAGENCY TRANSFER FORM - LAB / IMAGING / EKG / EMG RESULTS   4/18/2018                       14 INTENSIVE CARE UNIT: [phone]            Unresulted Labs (24h ago through future)    Start       Ordered    04/21/18 0600  Platelet  count  (Pharmacological Prophylaxis - enoxaparin (LOVENOX) *Use only if creatinine clearance is greater than 30 mL/min)  EVERY THREE DAYS,   Routine     Comments:  Repeat every 3 days while on VTE prophylaxis.  Notify provider and hold enoxaparin if platelet count falls by 50% of baseline. If no result is listed, this lab has not been done the past 365 days. LATEST LAB RESULT: Platelet Count (10e9/L)       Date                     Value                 04/18/2018               118 (L)          ----------    04/18/18 1937 04/21/18 0000  Creatinine  (Pharmacological Prophylaxis - enoxaparin (LOVENOX) *Use only if creatinine clearance is greater than 30 mL/min)  EVERY THREE DAYS,   Routine     Comments:  Repeat every 3 days while on VTE prophylaxis.    04/18/18 1937         Lab Results - 3 Days      Blood culture [[id number]]  Resulted: 04/20/18 1100, Result status: Preliminary result    Ordering provider: Chana Arevalo PA-C  04/18/18 1711 Resulting lab: [facility]    Specimen Information    Type Source Collected On   Blood  04/18/18 1722          Components       Value Reference Range Flag Lab   Specimen Description Blood      Special Requests Right Arm   HI   Culture Micro No growth after 2 days   HI            Blood culture [[id number]]  Resulted: 04/20/18 1100, Result status: Preliminary result    Ordering provider: Chana Arevalo PA-C  04/18/18 1700 Resulting lab: [facility]    Specimen Information    Type Source Collected On   Blood  04/18/18 1710          Components       Value Reference Range Flag Lab   Specimen Description Blood      Special Requests Left Arm   HI   Culture Micro No growth after 2 days   HI            Active MRSA Surveillance Culture [[id number]]  Resulted: 04/20/18 0709, Result status: Final result    Ordering provider: Vladimir Lino MD  04/18/18 1937 Resulting lab: [facility]    Specimen Information    Type Source Collected On    Nares Nose 04/18/18 1937          Components       Value Reference Range Flag Lab   Specimen Description Nares      Culture Micro No MRSA isolated   HI            Basic metabolic panel [[id number]]  Resulted: 04/20/18 0505, Result status: Final result    Ordering provider: Damir Raymond MD  04/19/18 2300 Resulting lab: [facility]    Specimen Information    Type Source Collected On   Blood  04/20/18 0439          Components       Value Reference Range Flag Lab   Sodium 143 133 - 144 mmol/L  HI   Potassium 3.8 3.4 - 5.3 mmol/L  HI   Chloride 107 94 - 109 mmol/L  HI   Carbon Dioxide 27 20 - 32 mmol/L  HI   Anion Gap 9 3 - 14 mmol/L  HI   Glucose 80 70 - 99 mg/dL  HI   Urea Nitrogen 10 7 - 30 mg/dL  HI   Creatinine 0.85 0.66 - 1.25 mg/dL  HI   GFR Estimate 85 >60 mL/min/1.7m2  HI   Comment:  Non  GFR Calc   GFR Estimate If Black >90 >60 mL/min/1.7m2  HI   Comment:  African American GFR Calc   Calcium 8.5 8.5 - 10.1 mg/dL  HI            Magnesium [[id number]]  Resulted: 04/20/18 0505, Result status: Final result    Ordering provider: Damir Raymond MD  04/19/18 2300 Resulting lab: [facility]    Specimen Information    Type Source Collected On   Blood  04/20/18 0439          Components       Value Reference Range Flag Lab   Magnesium 2.0 1.6 - 2.3 mg/dL  HI            CBC with platelets differential [[id number]] (Abnormal)  Resulted: 04/20/18 0448, Result status: Final result    Ordering provider: Damir Raymond MD  04/19/18 2300 Resulting lab: [facility]    Specimen Information    Type Source Collected On   Blood  04/20/18 0439          Components       Value Reference Range Flag Lab   WBC 4.7 4.0 - 11.0 10e9/L  HI   RBC Count 4.22 4.4 - 5.9 10e12/L L HI   Hemoglobin 12.8 13.3 - 17.7 g/dL L HI   Hematocrit 38.0 40.0 - 53.0 % L HI   MCV 90 78 - 100 fl  HI   MCH 30.3 26.5 - 33.0 pg  HI   MCHC 33.7 31.5 - 36.5 g/dL  HI   RDW 13.8 10.0 -  15.0 %  HI   Platelet Count 102 150 - 450 10e9/L L HI   Diff Method Automated Method   HI   % Neutrophils 57.6 %  HI   % Lymphocytes 26.3 %  HI   % Monocytes 8.5 %  HI   % Eosinophils 6.4 %  HI   % Basophils 0.8 %  HI   % Immature Granulocytes 0.4 %  HI   Nucleated RBCs 0 0 /100  HI   Absolute Neutrophil 2.7 1.6 - 8.3 10e9/L  HI   Absolute Lymphocytes 1.2 0.8 - 5.3 10e9/L  HI   Absolute Monocytes 0.4 0.0 - 1.3 10e9/L  HI   Absolute Eosinophils 0.3 0.0 - 0.7 10e9/L  HI   Absolute Basophils 0.0 0.0 - 0.2 10e9/L  HI   Abs Immature Granulocytes 0.0 0 - 0.4 10e9/L  HI   Absolute Nucleated RBC 0.0   HI            Procalcitonin [[id number]]  Resulted: 04/19/18 0726, Result status: Final result    Ordering provider: Damir Raymond MD  04/19/18 0699 Resulting lab: [facility]    Specimen Information    Type Source Collected On   Blood  04/19/18 0446          Components       Value Reference Range Flag Lab   Procalcitonin 1.42 ng/ml  HI   Comment:         0.50-1.99 ng/ml  Moderate risk of systemic infection.  Recommendation:   Recommend antibiotics. Evaluate culture results and clinical features to   target antibacterial therapy. Obtain blood cultures and other relevant   cultures if not done.  If empiric antibiotics were started, recheck PCT in: 2 days to guide   antibiotic de-escalation.  Discontinue or de-escalate antibiotics when PCT   concentration is <80% of peak or abs PCT <0.5. If empiric antibiotics were NOT   started, recheck PCT in 6-24 hours to re-evaluate need for antibiotics.              Lactic acid [[id number]] (Abnormal)  Resulted: 04/19/18 0527, Result status: Final result    Ordering provider: Vladimir Lino MD  04/19/18 7574 Resulting lab: [facility]    Specimen Information    Type Source Collected On   Blood  04/19/18 1026          Components       Value Reference Range Flag Lab   Lactic Acid 2.7 0.4 - 2.0 mmol/L H 59   Comment:         Significant value called  to and read back by  DENTON MAN AT 0525 BY TF              Basic metabolic panel [[id number]] (Abnormal)  Resulted: 04/19/18 0513, Result status: Final result    Ordering provider: Vladimir Lino MD  04/18/18 2300 Resulting lab: [facility]    Specimen Information    Type Source Collected On   Blood  04/19/18 0446          Components       Value Reference Range Flag Lab   Sodium 143 133 - 144 mmol/L  HI   Potassium 4.1 3.4 - 5.3 mmol/L  HI   Chloride 107 94 - 109 mmol/L  HI   Carbon Dioxide 27 20 - 32 mmol/L  HI   Anion Gap 9 3 - 14 mmol/L  HI   Glucose 91 70 - 99 mg/dL  HI   Urea Nitrogen 12 7 - 30 mg/dL  HI   Creatinine 0.83 0.66 - 1.25 mg/dL  HI   GFR Estimate 88 >60 mL/min/1.7m2  HI   Comment:  Non  GFR Calc   GFR Estimate If Black >90 >60 mL/min/1.7m2  HI   Comment:  African American GFR Calc   Calcium 8.3 8.5 - 10.1 mg/dL L HI            CBC with platelets differential [[id number]] (Abnormal)  Resulted: 04/19/18 0456, Result status: Final result    Ordering provider: Vladimir Lino MD  04/18/18 2300 Resulting lab: [facility]    Specimen Information    Type Source Collected On   Blood  04/19/18 0446          Components       Value Reference Range Flag Lab   WBC 6.5 4.0 - 11.0 10e9/L  HI   RBC Count 4.34 4.4 - 5.9 10e12/L L HI   Hemoglobin 13.0 13.3 - 17.7 g/dL L HI   Hematocrit 39.2 40.0 - 53.0 % L HI   MCV 90 78 - 100 fl  HI   MCH 30.0 26.5 - 33.0 pg  HI   MCHC 33.2 31.5 - 36.5 g/dL  HI   RDW 14.1 10.0 - 15.0 %  HI   Platelet Count 100 150 - 450 10e9/L L HI   Diff Method Automated Method   HI   % Neutrophils 64.2 %  HI   % Lymphocytes 23.3 %  HI   % Monocytes 9.3 %  HI   % Eosinophils 2.5 %  HI   % Basophils 0.5 %  HI   % Immature Granulocytes 0.2 %  HI   Nucleated RBCs 0 0 /100  HI   Absolute Neutrophil 4.2 1.6 - 8.3 10e9/L  HI   Absolute Lymphocytes 1.5 0.8 - 5.3 10e9/L  HI   Absolute Monocytes 0.6 0.0 - 1.3 10e9/L  HI   Absolute Eosinophils 0.2  0.0 - 0.7 10e9/L  HI   Absolute Basophils 0.0 0.0 - 0.2 10e9/L  HI   Abs Immature Granulocytes 0.0 0 - 0.4 10e9/L  HI   Absolute Nucleated RBC 0.0   HI            Lactic acid [[id number]] (Abnormal)  Resulted: 04/18/18 2012, Result status: Final result    Ordering provider: Chana Arevalo PA-C  04/18/18 1839 Resulting lab: [facility]    Specimen Information    Type Source Collected On   Blood  04/18/18 1941          Components       Value Reference Range Flag Lab   Lactic Acid 3.2 0.4 - 2.0 mmol/L H 59   Comment:         Significant value called to and read back by  DENTON MAN @ 2011 ON 04/18/2018 BY B              Procalcitonin [[id number]]  Resulted: 04/18/18 1810, Result status: Final result    Ordering provider: Chana Arevalo PA-C  04/18/18 1741 Resulting lab: [facility]    Specimen Information    Type Source Collected On   Blood  04/18/18 1710          Components       Value Reference Range Flag Lab   Procalcitonin 0.62 ng/ml  HI   Comment:         0.50-1.99 ng/ml  Moderate risk of systemic infection.  Recommendation:   Recommend antibiotics. Evaluate culture results and clinical features to   target antibacterial therapy. Obtain blood cultures and other relevant   cultures if not done.  If empiric antibiotics were started, recheck PCT in: 2 days to guide   antibiotic de-escalation.  Discontinue or de-escalate antibiotics when PCT   concentration is <80% of peak or abs PCT <0.5. If empiric antibiotics were NOT   started, recheck PCT in 6-24 hours to re-evaluate need for antibiotics.              CRP inflammation [[id number]] (Abnormal)  Resulted: 04/18/18 1756, Result status: Final result    Ordering provider: Chana Arevalo PA-C  04/18/18 1740 Resulting lab: [facility]    Specimen Information    Type Source Collected On   Blood  04/18/18 1710          Components       Value Reference Range Flag Lab   CRP Inflammation 19.7 0.0 - 8.0 mg/L H HI             Influenza A/B antigen [[id number]]  Resulted: 04/18/18 1739, Result status: Final result    Ordering provider: Chana Arevalo PA-C  04/18/18 1700 Resulting lab: [facility]    Specimen Information    Type Source Collected On   Nares  04/18/18 1715          Components       Value Reference Range Flag Lab   Influenza A/B Agn Specimen Nares   HI   Influenza A Negative NEG^Negative  HI   Influenza B Negative NEG^Negative  HI   Comment:         Test results must be correlated with clinical data. If necessary, results   should be confirmed by a molecular assay or viral culture.              Lactic acid [[id number]] (Abnormal)  Resulted: 04/18/18 1739, Result status: Final result    Ordering provider: Chana Arevalo PA-C  04/18/18 1700 Resulting lab: [facility]    Specimen Information    Type Source Collected On   Blood  04/18/18 1710          Components       Value Reference Range Flag Lab   Lactic Acid 3.0 0.4 - 2.0 mmol/L H HI   Comment:         Significant value called to and read back by  ARTHUR VILLEGAS @ 1738 ON 4/18/2018 BY [facility]              Comprehensive metabolic panel [[id number]] (Abnormal)  Resulted: 04/18/18 1737, Result status: Final result    Ordering provider: Chana Arevalo PA-C  04/18/18 1700 Resulting lab: [facility]    Specimen Information    Type Source Collected On   Blood  04/18/18 1710          Components       Value Reference Range Flag Lab   Sodium 138 133 - 144 mmol/L  HI   Potassium 3.9 3.4 - 5.3 mmol/L  HI   Chloride 102 94 - 109 mmol/L  HI   Carbon Dioxide 24 20 - 32 mmol/L  HI   Anion Gap 12 3 - 14 mmol/L  HI   Glucose 118 70 - 99 mg/dL H HI   Urea Nitrogen 14 7 - 30 mg/dL  HI   Creatinine 0.84 0.66 - 1.25 mg/dL  HI   GFR Estimate 87 >60 mL/min/1.7m2  HI   Comment:  Non  GFR Calc   GFR Estimate If Black >90 >60 mL/min/1.7m2  HI   Comment:  African American GFR Calc   Calcium 9.1 8.5 - 10.1 mg/dL  HI   Bilirubin Total 0.8  0.2 - 1.3 mg/dL  HI   Albumin 3.8 3.4 - 5.0 g/dL  HI   Protein Total 7.4 6.8 - 8.8 g/dL  HI   Alkaline Phosphatase 62 40 - 150 U/L  HI   ALT 14 0 - 70 U/L  HI   AST 14 0 - 45 U/L  HI            CBC with platelets differential [[id number]] (Abnormal)  Resulted: 04/18/18 1718, Result status: Final result    Ordering provider: Chana Arevalo PA-C  04/18/18 1700 Resulting lab: [facility]    Specimen Information    Type Source Collected On   Blood  04/18/18 1710          Components       Value Reference Range Flag Lab   WBC 9.0 4.0 - 11.0 10e9/L  HI   RBC Count 5.19 4.4 - 5.9 10e12/L  HI   Hemoglobin 15.4 13.3 - 17.7 g/dL  HI   Hematocrit 45.6 40.0 - 53.0 %  HI   MCV 88 78 - 100 fl  HI   MCH 29.7 26.5 - 33.0 pg  HI   MCHC 33.8 31.5 - 36.5 g/dL  HI   RDW 14.0 10.0 - 15.0 %  HI   Platelet Count 118 150 - 450 10e9/L L HI   Diff Method Automated Method   HI   % Neutrophils 85.1 %  HI   % Lymphocytes 8.5 %  HI   % Monocytes 5.1 %  HI   % Eosinophils 0.7 %  HI   % Basophils 0.3 %  HI   % Immature Granulocytes 0.3 %  HI   Nucleated RBCs 0 0 /100  HI   Absolute Neutrophil 7.7 1.6 - 8.3 10e9/L  HI   Absolute Lymphocytes 0.8 0.8 - 5.3 10e9/L  HI   Absolute Monocytes 0.5 0.0 - 1.3 10e9/L  HI   Absolute Eosinophils 0.1 0.0 - 0.7 10e9/L  HI   Absolute Basophils 0.0 0.0 - 0.2 10e9/L  HI   Abs Immature Granulocytes 0.0 0 - 0.4 10e9/L  HI   Absolute Nucleated RBC 0.0   HI            Testing Performed By     Lab - Abbreviation Name Director Address Valid Date Range    59 - Unknown [facility] Unknown [address] [facility] [address] 12/31/14 1006 - Present    210 - HI [facility] Unknown [address] [address] 05/08/15 1057 - Present               Imaging Results - 3 Days      XR Chest Port 1 View [[id number]]  Resulted: 04/20/18 0855, Result status: Final result    Ordering provider: Damir Raymond MD  04/20/18 0622 Resulted by: Jay Jay Fonseca MD     Performed: 04/20/18 0653 - 04/20/18 0709 Resulting lab: RADIOLOGY RESULTS    Narrative:       XR CHEST PORT 1 VW    HISTORY: 87 yearsMale infiltrate;    TECHNIQUE: A single view of the chest was performed    COMPARISON: 4/18/2018    FINDINGS: Heart size and pulmonary vascularity are within normal  limits. Lungs are clear. No consolidating airspace opacities are  present. There is mild unchanged right basilar opacity. There is  questionable opacity at the medial left lung base. Question  atelectasis versus pneumonia.      Impression:       IMPRESSION: No significant interval change.    LEXIE RIVERA MD      XR Chest Port 1 View [[id number]]  Resulted: 04/18/18 1754, Result status: Final result    Ordering provider: Chana Arevalo PA-C  04/18/18 1717 Resulted by: Marianela Miramontes MD    Performed: 04/18/18 1730 - 04/18/18 1742 Resulting lab: RADIOLOGY RESULTS    Narrative:       PROCEDURE:  XR CHEST PORT 1 VW    HISTORY:  fever; .     COMPARISON:  7/18/2016    FINDINGS:   The cardiac silhouette is normal in size. The pulmonary vasculature is  normal.  There are airspace opacities in the right lower lung,  increased. There is mild left basilar atelectasis. No pleural effusion  or pneumothorax. Sided cardiac generator device is stable.      Impression:       IMPRESSION:  Right basilar infiltrate.      MARIANELA MIRAMONTES MD      Testing Performed By     Lab - Abbreviation Name Director Address Valid Date Range    104 - Rad [facility] RADIOLOGY RESULTS Unknown Unknown 02/16/05 1553 - Present            Encounter-Level Documents:     There are no encounter-level documents.      Order-Level Documents:     There are no order-level documents.

## 2018-04-18 NOTE — ED PROVIDER NOTES
History     Chief Complaint   Patient presents with     Fever     Pacemaker battery changed recently. Fever started today.     The history is provided by the EMS personnel and the nursing home.     Toi Cowart is a 87 year old male who presented to the emergency department via EMS from [facility] for evaluation of a fever and altered mental status..  See history of dementia.  His past medical history is most significant for a recent pacemaker battery change on April 16.  Developed a fever and increased confusion today.  They deny any significant respiratory complaints.  He has no abdominal pain.  History of present illness is difficult due to dementia.    Problem List:    Patient Active Problem List    Diagnosis Date Noted     Aspiration pneumonia (H) 07/14/2016     Priority: Medium     GERD (gastroesophageal reflux disease) 04/01/2013     Priority: Medium     Fatigue 04/01/2013     Priority: Medium     Essential hypertension 04/01/2013     Priority: Medium     Sleep disorder 04/01/2013     Priority: Medium     CAD (coronary artery disease) 04/01/2013     Priority: Medium     ACP (advance care planning) 09/21/2012     Priority: Medium     Advance Care Planning 8/19/2016: Receipt of ACP document:  Received: Health Care Directive which was witnessed or notarized on 03/06/2007.  Document previously scanned on 07/28/2016.  Order reviewed and found to be valid.  Code Status reflects choices in most recent ACP document.  Confirmed/documented designated decision maker(s).  Added by Kassandra Maya             Cardiac pacemaker, Medtronic, Dual chamber - DEPENDENT 08/14/2012     Priority: Medium     Sinoatrial node dysfunction (H) 04/23/2012     Priority: Medium     Hypercholesteremia 08/30/2011     Priority: Medium     Dementia with behavioral disturbance 01/01/2011     Priority: Medium     Osteoarthritis 01/01/2011     Priority: Medium     Problem list name updated by automated process. Provider to review        Bilateral inguinal hernia 11/06/2003     Priority: Medium     Problem list name updated by automated process. Provider to review          Past Medical History:    Past Medical History:   Diagnosis Date     CAD (coronary artery disease) 4/1/2013     Cardiac pacemaker, Medtronic, Dual chamber 8/14/2012     Cellulitis 3/25/13     Dementia 1/1/2011     Essential hypertension 4/1/2013     Fatigue 4/1/2013     GERD (gastroesophageal reflux disease) 4/1/2013     Hypercholesteremia 8/30/2011     Inguinal hernia without mention of obstruction or gangrene, bilateral, (not specified as recurrent) 11/6/2003     Osteoarthrosis 1/1/2011     Senile dementia 4/1/2013     Sinoatrial node dysfunction (H) 4/23/2012     Sleep disorder 4/1/2013     Thigh pain 1/1/2011       Past Surgical History:    Past Surgical History:   Procedure Laterality Date     C TOTAL HIP ARTHROPLASTY  2007    right     CATARACT IOL, RT/LT  2008    bilateral     Colon resection      colon cancer     COLONOSCOPY  2003     pace maker  2005    complete heart block; DDDR     REPLACE PACEMAKER GENERATOR N/A 4/16/2018    Procedure: REPLACE PACEMAKER GENERATOR;  Pacemaker Generator Change;  Surgeon: Heron Baker MD;  Location: [facility] OR     STENT  1994    angioplasty     STENT, CORONARY, JANINA         Family History:    Family History   Problem Relation Age of Onset     Cancer - colorectal Father      Dementia Mother      DIABETES Maternal Aunt        Social History:  Marital Status:   [2]  Social History   Substance Use Topics     Smoking status: Former Smoker     Types: Cigarettes     Smokeless tobacco: Not on file      Comment: quit 30 years ago, 1965. no passive exposure     Alcohol use No        Medications:      CALMOSEPTINE 0.44-20.6 % OINT   divalproex (DEPAKOTE SPRINKLE) 125 MG CR capsule   gentamicin (GARAMYCIN) 0.3 % ophthalmic solution   HYDROcodone-acetaminophen (NORCO) 5-325 MG per tablet   loperamide (IMODIUM) 2 MG capsule   nystatin (MYCOSTATIN)  "[id number] UNIT/GM POWD   omeprazole (PRILOSEC) 20 MG CR capsule   psyllium (METAMUCIL/KONSYL) Packet   Rectal Barrier   SB IBUPROFEN 200 MG tablet         Review of Systems   Unable to perform ROS: Dementia       Physical Exam   BP: 141/85  Heart Rate: 112  Temp: (!) 103  F (39.4  C)  Resp: 24  Height: 170.2 cm (5' 7\")  Weight: 83.2 kg (183 lb 6.8 oz)  SpO2: 96 %      Physical Exam   Constitutional: He appears well-developed and well-nourished.   Ill-appearing   Cardiovascular: Regular rhythm.    Mild tachycardia   Pulmonary/Chest: Effort normal. No respiratory distress.   Right-sided rhonchi.   Abdominal: Soft. There is no tenderness.   Musculoskeletal: He exhibits no edema.   Pacemaker site shows some mild erythema and heat.  Seems mostly consistent with post procedure inflammation    Neurological: He is alert.   Skin: Skin is warm and dry.   Nursing note and vitals reviewed.      ED Course     ED Course     Procedures           EKG shows a paced rhythm with what appears to be normal discordance.    Critical Care time:  none     The patient has signs of Severe Sepsisas evidenced by:    1. 2 SIRS criteria, AND  2. Suspected infection, AND   3. Organ dysfunction: Lactic Acid > 1.9 and Acute encephalopathy due to sepsis    Time severe sepsis diagnosis confirmed = 1745 as this was the time when Lactate resulted, and the level was > 1.9      3 Hour Severe Sepsis Bundle Completion:  1. Initial Lactic Acid Result:   Recent Labs   Lab Test  04/18/18   1710  07/18/16   0117  07/17/16   1523   LACT  3.0*  2.5*  2.8*     2. Blood Cultures before Antibiotics: Yes  3. Broad Spectrum Antibiotics Administered: Yes     Anti-infectives (Future)    Start     Dose/Rate Route Frequency Ordered Stop    04/18/18 1753  piperacillin-tazobactam (ZOSYN) intermittent infusion 4.5 g      4.5 g  200 mL/hr over 30 Minutes Intravenous ONCE 04/18/18 1752      04/18/18 1749  vancomycin (VANCOCIN) 1,250 mg in sodium chloride 0.9 % 250 mL intermittent " infusion      1,250 mg  192 mL/hr over 90 Minutes Intravenous ONCE 04/18/18 1748          4. 2000 ml of IV fluids.  Ideal body weight: 66.1 kg (145 lb 11.6 oz)  Adjusted ideal body weight: 72.9 kg (160 lb 12.9 oz)    Severe Sepsis reassessment:  1. Repeat Lactic Acid Level: per hospitalist                     Results for orders placed or performed during the hospital encounter of 04/18/18 (from the past 24 hour(s))   CBC with platelets differential   Result Value Ref Range    WBC 9.0 4.0 - 11.0 10e9/L    RBC Count 5.19 4.4 - 5.9 10e12/L    Hemoglobin 15.4 13.3 - 17.7 g/dL    Hematocrit 45.6 40.0 - 53.0 %    MCV 88 78 - 100 fl    MCH 29.7 26.5 - 33.0 pg    MCHC 33.8 31.5 - 36.5 g/dL    RDW 14.0 10.0 - 15.0 %    Platelet Count 118 (L) 150 - 450 10e9/L    Diff Method Automated Method     % Neutrophils 85.1 %    % Lymphocytes 8.5 %    % Monocytes 5.1 %    % Eosinophils 0.7 %    % Basophils 0.3 %    % Immature Granulocytes 0.3 %    Nucleated RBCs 0 0 /100    Absolute Neutrophil 7.7 1.6 - 8.3 10e9/L    Absolute Lymphocytes 0.8 0.8 - 5.3 10e9/L    Absolute Monocytes 0.5 0.0 - 1.3 10e9/L    Absolute Eosinophils 0.1 0.0 - 0.7 10e9/L    Absolute Basophils 0.0 0.0 - 0.2 10e9/L    Abs Immature Granulocytes 0.0 0 - 0.4 10e9/L    Absolute Nucleated RBC 0.0    Comprehensive metabolic panel   Result Value Ref Range    Sodium 138 133 - 144 mmol/L    Potassium 3.9 3.4 - 5.3 mmol/L    Chloride 102 94 - 109 mmol/L    Carbon Dioxide 24 20 - 32 mmol/L    Anion Gap 12 3 - 14 mmol/L    Glucose 118 (H) 70 - 99 mg/dL    Urea Nitrogen 14 7 - 30 mg/dL    Creatinine 0.84 0.66 - 1.25 mg/dL    GFR Estimate 87 >60 mL/min/1.7m2    GFR Estimate If Black >90 >60 mL/min/1.7m2    Calcium 9.1 8.5 - 10.1 mg/dL    Bilirubin Total 0.8 0.2 - 1.3 mg/dL    Albumin 3.8 3.4 - 5.0 g/dL    Protein Total 7.4 6.8 - 8.8 g/dL    Alkaline Phosphatase 62 40 - 150 U/L    ALT 14 0 - 70 U/L    AST 14 0 - 45 U/L   Lactic acid   Result Value Ref Range    Lactic Acid 3.0 (H)  0.4 - 2.0 mmol/L   CRP inflammation   Result Value Ref Range    CRP Inflammation 19.7 (H) 0.0 - 8.0 mg/L   Procalcitonin   Result Value Ref Range    Procalcitonin 0.62 ng/ml   Influenza A/B antigen   Result Value Ref Range    Influenza A/B Agn Specimen Nares     Influenza A Negative NEG^Negative    Influenza B Negative NEG^Negative   XR Chest Port 1 View    Narrative    PROCEDURE:  XR CHEST PORT 1 VW    HISTORY:  fever; .     COMPARISON:  7/18/2016    FINDINGS:   The cardiac silhouette is normal in size. The pulmonary vasculature is  normal.  There are airspace opacities in the right lower lung,  increased. There is mild left basilar atelectasis. No pleural effusion  or pneumothorax. Sided cardiac generator device is stable.      Impression    IMPRESSION:  Right basilar infiltrate.      MARIANELA MIRAMONTES MD       Medications   vancomycin (VANCOCIN) 1,250 mg in sodium chloride 0.9 % 250 mL intermittent infusion (1,250 mg Intravenous New Bag 4/18/18 1800)   piperacillin-tazobactam (ZOSYN) intermittent infusion 4.5 g (not administered)   acetaminophen (TYLENOL) tablet 975 mg (975 mg Oral Given 4/18/18 1729)   lactated ringers infusion ( Intravenous New Bag 4/18/18 1732)       Assessments & Plan (with Medical Decision Making)   Elevated lactic acid and procalcitonin along with CXR findings. Broad spectrum antibiotics.  Pacemaker site does not look overtly infectious, but needs to be monitored.  Admit for further care. Discussed with Dr. Lino, who graciously accepted his care.     I have reviewed the nursing notes.    I have reviewed the findings, diagnosis, plan and need for follow up with the patient.      New Prescriptions    No medications on file       Final diagnoses:   Healthcare-associated pneumonia       4/18/2018   [facility]     Chana Arevalo PA-C  04/18/18 1823       Chana Arevalo PA-C  04/18/18 1829       Chana Arevalo PA-C  04/18/18 1831

## 2018-04-18 NOTE — TELEPHONE ENCOUNTER
4:12 PM    Reason for Call: Phone Call    Description: Patient recently had a pacemaker put in and is very lathargic,shivering, increase weakaness, hard time speaking ,also cry weeping a high temp and high pulse rate.     Was an appointment offered for this call? No  If yes : Appointment type              Date    Preferred method for responding to this message: Telephone Call  What is your phone number ? [phone]     If we cannot reach you directly, may we leave a detailed response at the number you provided? Yes    Can this message wait until your PCP/provider returns, if available today? No,     Sofie Govea

## 2018-04-19 LAB
ANION GAP SERPL CALCULATED.3IONS-SCNC: 9 MMOL/L (ref 3–14)
BASOPHILS # BLD AUTO: 0 10E9/L (ref 0–0.2)
BASOPHILS NFR BLD AUTO: 0.5 %
BUN SERPL-MCNC: 12 MG/DL (ref 7–30)
CALCIUM SERPL-MCNC: 8.3 MG/DL (ref 8.5–10.1)
CHLORIDE SERPL-SCNC: 107 MMOL/L (ref 94–109)
CO2 SERPL-SCNC: 27 MMOL/L (ref 20–32)
CREAT SERPL-MCNC: 0.83 MG/DL (ref 0.66–1.25)
DIFFERENTIAL METHOD BLD: ABNORMAL
EOSINOPHIL # BLD AUTO: 0.2 10E9/L (ref 0–0.7)
EOSINOPHIL NFR BLD AUTO: 2.5 %
ERYTHROCYTE [DISTWIDTH] IN BLOOD BY AUTOMATED COUNT: 14.1 % (ref 10–15)
GFR SERPL CREATININE-BSD FRML MDRD: 88 ML/MIN/1.7M2
GLUCOSE SERPL-MCNC: 91 MG/DL (ref 70–99)
HCT VFR BLD AUTO: 39.2 % (ref 40–53)
HGB BLD-MCNC: 13 G/DL (ref 13.3–17.7)
IMM GRANULOCYTES # BLD: 0 10E9/L (ref 0–0.4)
IMM GRANULOCYTES NFR BLD: 0.2 %
LACTATE SERPL-SCNC: 2.7 MMOL/L (ref 0.4–2)
LYMPHOCYTES # BLD AUTO: 1.5 10E9/L (ref 0.8–5.3)
LYMPHOCYTES NFR BLD AUTO: 23.3 %
MCH RBC QN AUTO: 30 PG (ref 26.5–33)
MCHC RBC AUTO-ENTMCNC: 33.2 G/DL (ref 31.5–36.5)
MCV RBC AUTO: 90 FL (ref 78–100)
MONOCYTES # BLD AUTO: 0.6 10E9/L (ref 0–1.3)
MONOCYTES NFR BLD AUTO: 9.3 %
NEUTROPHILS # BLD AUTO: 4.2 10E9/L (ref 1.6–8.3)
NEUTROPHILS NFR BLD AUTO: 64.2 %
NRBC # BLD AUTO: 0 10*3/UL
NRBC BLD AUTO-RTO: 0 /100
PLATELET # BLD AUTO: 100 10E9/L (ref 150–450)
POTASSIUM SERPL-SCNC: 4.1 MMOL/L (ref 3.4–5.3)
PROCALCITONIN SERPL-MCNC: 1.42 NG/ML
RBC # BLD AUTO: 4.34 10E12/L (ref 4.4–5.9)
SODIUM SERPL-SCNC: 143 MMOL/L (ref 133–144)
WBC # BLD AUTO: 6.5 10E9/L (ref 4–11)

## 2018-04-19 PROCEDURE — 99232 SBSQ HOSP IP/OBS MODERATE 35: CPT | Performed by: INTERNAL MEDICINE

## 2018-04-19 PROCEDURE — 83605 ASSAY OF LACTIC ACID: CPT | Performed by: INTERNAL MEDICINE

## 2018-04-19 PROCEDURE — 36415 COLL VENOUS BLD VENIPUNCTURE: CPT | Performed by: INTERNAL MEDICINE

## 2018-04-19 PROCEDURE — 12000011 ZZH R&B MS OVERFLOW

## 2018-04-19 PROCEDURE — 80048 BASIC METABOLIC PNL TOTAL CA: CPT | Performed by: INTERNAL MEDICINE

## 2018-04-19 PROCEDURE — 25000125 ZZHC RX 250: Performed by: INTERNAL MEDICINE

## 2018-04-19 PROCEDURE — 85025 COMPLETE CBC W/AUTO DIFF WBC: CPT | Performed by: INTERNAL MEDICINE

## 2018-04-19 PROCEDURE — 84145 PROCALCITONIN (PCT): CPT | Performed by: INTERNAL MEDICINE

## 2018-04-19 PROCEDURE — 25000128 H RX IP 250 OP 636: Performed by: INTERNAL MEDICINE

## 2018-04-19 PROCEDURE — 25000132 ZZH RX MED GY IP 250 OP 250 PS 637: Performed by: INTERNAL MEDICINE

## 2018-04-19 RX ORDER — DIVALPROEX SODIUM 125 MG/1
125 CAPSULE, COATED PELLETS ORAL EVERY 8 HOURS SCHEDULED
Status: DISCONTINUED | OUTPATIENT
Start: 2018-04-19 | End: 2018-04-20 | Stop reason: HOSPADM

## 2018-04-19 RX ORDER — GENTAMICIN SULFATE 3 MG/ML
1 SOLUTION/ DROPS OPHTHALMIC 3 TIMES DAILY
Status: DISCONTINUED | OUTPATIENT
Start: 2018-04-19 | End: 2018-04-20 | Stop reason: HOSPADM

## 2018-04-19 RX ADMIN — TAZOBACTAM SODIUM AND PIPERACILLIN SODIUM 4.5 G: 500; 4 INJECTION, SOLUTION INTRAVENOUS at 05:04

## 2018-04-19 RX ADMIN — OXYCODONE HYDROCHLORIDE 5 MG: 5 TABLET ORAL at 04:55

## 2018-04-19 RX ADMIN — TAZOBACTAM SODIUM AND PIPERACILLIN SODIUM 3.38 G: 375; 3 INJECTION, SOLUTION INTRAVENOUS at 17:48

## 2018-04-19 RX ADMIN — FAMOTIDINE 20 MG: 20 INJECTION, SOLUTION INTRAVENOUS at 09:33

## 2018-04-19 RX ADMIN — TAZOBACTAM SODIUM AND PIPERACILLIN SODIUM 3.38 G: 375; 3 INJECTION, SOLUTION INTRAVENOUS at 12:08

## 2018-04-19 RX ADMIN — SENNOSIDES AND DOCUSATE SODIUM 2 TABLET: 8.6; 5 TABLET ORAL at 09:37

## 2018-04-19 RX ADMIN — SENNOSIDES AND DOCUSATE SODIUM 2 TABLET: 8.6; 5 TABLET ORAL at 21:52

## 2018-04-19 RX ADMIN — DIVALPROEX SODIUM 125 MG: 125 CAPSULE, COATED PELLETS ORAL at 13:11

## 2018-04-19 RX ADMIN — GENTAMICIN SULFATE 1 DROP: 3 SOLUTION/ DROPS OPHTHALMIC at 21:54

## 2018-04-19 RX ADMIN — DIVALPROEX SODIUM 125 MG: 125 CAPSULE, COATED PELLETS ORAL at 09:48

## 2018-04-19 RX ADMIN — DIVALPROEX SODIUM 125 MG: 125 CAPSULE, COATED PELLETS ORAL at 21:52

## 2018-04-19 RX ADMIN — TAZOBACTAM SODIUM AND PIPERACILLIN SODIUM 3.38 G: 375; 3 INJECTION, SOLUTION INTRAVENOUS at 23:49

## 2018-04-19 RX ADMIN — GENTAMICIN SULFATE 1 DROP: 3 SOLUTION/ DROPS OPHTHALMIC at 13:14

## 2018-04-19 RX ADMIN — VANCOMYCIN HYDROCHLORIDE 1250 MG: 1 INJECTION, POWDER, LYOPHILIZED, FOR SOLUTION INTRAVENOUS at 12:48

## 2018-04-19 RX ADMIN — PSYLLIUM HUSK 1 PACKET: 3.4 POWDER ORAL at 09:36

## 2018-04-19 RX ADMIN — OXYCODONE HYDROCHLORIDE 5 MG: 5 TABLET ORAL at 22:55

## 2018-04-19 NOTE — PHARMACY-VANCOMYCIN DOSING SERVICE
Pharmacy Vancomycin Initial Note  Date of Service 2018  Patient's  1930  87 year old, male    Indication: Sepsis    Current estimated CrCl = Estimated Creatinine Clearance: 63.9 mL/min (based on Cr of 0.84).    Creatinine for last 3 days  2018:  5:10 PM Creatinine 0.84 mg/dL    Recent Vancomycin Level(s) for last 3 days  No results found for requested labs within last 72 hours.      Vancomycin IV Administrations (past 72 hours)                   vancomycin (VANCOCIN) 1,250 mg in sodium chloride 0.9 % 250 mL intermittent infusion (mg) 1,250 mg New Bag 18 1800                Nephrotoxins and other renal medications (Future)    Start     Dose/Rate Route Frequency Ordered Stop    18 1200  vancomycin (VANCOCIN) 1,250 mg in sodium chloride 0.9 % 250 mL intermittent infusion      1,250 mg  192 mL/hr over 90 Minutes Intravenous EVERY 18 HOURS 18 0000  piperacillin-tazobactam (ZOSYN) intermittent infusion 4.5 g      4.5 g  200 mL/hr over 30 Minutes Intravenous EVERY 6 HOURS 18 193            Contrast Orders - past 72 hours     None                Plan:  1.  Start vancomycin  1250 mg IV q18h.   2.  Goal Trough Level: 15-20 mg/L   3.  Pharmacy will check trough levels as appropriate in 3-5 Days.    4. Serum creatinine levels will be ordered daily for the first week of therapy and at least twice weekly for subsequent weeks.    5. Strang method utilized to dose vancomycin therapy: Method 2    Elissa Jones

## 2018-04-19 NOTE — PROGRESS NOTES
[facility]    Hospitalist Progress Note    Date of Service (when I saw the patient): 04/19/2018    Assessment & Plan   Toi oCwart is a 87 year old  man who was admitted on 4/18/2018.  He presented from [facility] where fever and increased confusion was noted.  History significant for a established dementia, HTN, CAD, SA node dysfunction with PPM and battery change 4/16/2018.  Overall on initial evaluation he appeared quite comfortable.  The morning following admission he did note some dyspnea initially which he believes has fully resolved.  Mild tachypnea on presentation without significant hypoxemia.    1. Acute hypoxemic respiratory failure  Possible severe sepsis  Although has not required oxygen supplementation oxygenation is improved somewhat since his initial presentation.  He first showed some tachycardia and tachypnea consistent with respiratory failure improving relatively rapidly.  Please see below for considerations of pulmonary infection.  Felt to represent severe sepsis with a mild encephalopathy given confusion in the face of his established dementia as well as elevated lactate.  Lactate elevation may represent his tachypnea alone however.  Any established severe sepsis was transient and appears to be fully resolved.  Had planned to further lactate measurements to ensure clearance but intravenous access is been extremely limited and decided early this morning that the burden of multiple attempts at vascular access greater than any benefit.  At this point borderline respiratory failure most marked by intermittent tachypnea.  No significant respiratory effort.  2.  Pneumonia  In fact somewhat difficult to establish.  He had concerns for an aspiration pneumonitis in the past.  He does show a right lower lobe airspace infiltrate although this would be consistent with inflammatory noninfectious pneumonitis or an infectious process.  Pro-calcitonin has shown an increase since his  presentation although this does not reliably differentiate between an infectious and inflammatory parenchymal infiltrate.  He is at some increased risk of resistant organisms as he is a resident in a nursing home.  He also recently had a pacemaker battery changed although the site looks excellent without evidence of any localized inflammation or infection.  Significant fevers at least initially which again did not help distinguish between a noninfectious inflammatory than infectious process.  However as both a relatively broad spectrum of aerobic as well as anaerobic organisms and gram-positive skin organisms must be considered will continue piperacillin/tazobactam and vancomycin at least for the first 24 hours.  If cultures are unrevealing at that time would be inclined to discontinue vancomycin.  Given his rapid improvement a strong case could be made for an aspiration pneumonitis.  Unfortunately is unlikely that a definitive diagnosis will be likely.  3.  Permanent pacemaker placement  Recent battery change.  SA node dysfunction as indication for pacemaker placement.  4.  Established gastroesophageal reflux  Continue cyto-protection.  Resume oral PPI.  .  Coronary artery disease  No evidence of any active ischemia.  5.  Dementia  Suspect this is now at baseline.  As noted some interval acute confusion but now appears to have resolved.      Principal Problem:    HCAP (healthcare-associated pneumonia)  Active Problems:    Cardiac pacemaker, Medtronic, Dual chamber - DEPENDENT    GERD (gastroesophageal reflux disease)    Essential hypertension    CAD (coronary artery disease)    Hypercholesteremia      # Pain Assessment:  Current Pain Score 4/18/2018   Patient currently in pain? yes   Pain score (0-10) -   Pain location Generalized   Pain descriptors -   - Toi is experiencing pain due to generalized chronic pain. Pain management was discussed, although difficult to engage the patient given his underlying dementia.   Chronically treated with oral opiates.  Will continue this.  Acetaminophen as a part of multimodality treatment.  Minimize opiates as much as possible.       DVT Prophylaxis: Subcutaneous enoxaparin  Code Status: DNR/DNI    Disposition: Expected discharge in 2-3 days depending on his course.  Damir REYESElpidio Tsang    Interval History   Awake and alert.  Fluid speech although little response beyond response to direct questions were somewhat stereotyped response.  Oriented to person and generically to place.    -Data reviewed today: I reviewed all new labs and imaging results over the last 24 hours. I personally reviewed chest radiograph showing a right lower lobe airspace infiltrate.    Peripheral IV 04/18/18 Left (Active)   Site Assessment [facility] 4/18/2018  7:00 PM   Line Status Infusing 4/18/2018  7:00 PM   Phlebitis Scale 0-->no symptoms 4/18/2018  7:00 PM   Infiltration Scale 0 4/18/2018  7:00 PM   Number of days:1       Peripheral IV 04/18/18 Right Upper arm (Active)   Site Assessment [facility] 4/18/2018  7:00 PM   Line Status Infusing 4/18/2018  7:00 PM   Phlebitis Scale 0-->no symptoms 4/18/2018  7:00 PM   Infiltration Scale 0 4/18/2018  7:00 PM   Number of days:1       Wound 03/25/13 (Active)   Number of days:1851       Wound Tibial Abrasion(s);Shear injury Two large blisters to left shin with abrasions (Active)   Number of days:       Wound 07/16/16 Mid Scrotum Skin tear Pt scatched open area to scrotum (Active)   Number of days:642       Incision/Surgical Site 04/16/18 Left Chest (Active)   Number of days:3     Line/device assessment(s) completed for medical necessity    Physical Exam   Temp: 99.3  F (37.4  C) Temp src: Tympanic BP: 147/63   Heart Rate: 66 Resp: 20 SpO2: 98 % O2 Device: None (Room air)    Vitals:    04/18/18 1703 04/19/18 0548   Weight: 83.2 kg (183 lb 6.8 oz) 86.1 kg (189 lb 13.1 oz)     Vital Signs with Ranges  Temp:  [98.8  F (37.1  C)-103  F (39.4  C)] 99.3  F (37.4  C)  Heart Rate:  []  66  Resp:  [16-24] 20  BP: ()/(50-95) 147/63  SpO2:  [92 %-98 %] 98 %  I/O last 3 completed shifts:  In: 3100 [P.O.:240; I.V.:2860]  Out: -     Awake, alert, concentration preserved   HEENT: Pupils equal, conjugate. No icterus or nystagmus. Oral mucosa moist. No facial asymmetry.   Neck: Supple, jugular veins not elevated. Trachea midline   Chest: No chest wall movement asymmetry. Aeration mildly diminished at bases. Accessory muscles not in use. Expiratory time not increased. No tidal wheezes.  Minimal wheeze on forced expiratory maneuver right upper lung fields.  Few rhonchi right base.  Few coarse crackles to right base.   Cardiac: PMI not displaced. S1, S2 unremarkable. No S3, S4. P2 not accentuated. No murmurs.   Abdomen: Soft. No palpation or percussion tenderness. No distention. Normoactive bowel sounds. Liver and spleen not increased in size. No bruits, masses, or pulsations.   Extremities: No lower extremity edema.  Extremities warm distally.  No eccymoses, clubbing.   Neurologic: Mental state above.  Moving all extremities.  Precise examination somewhat difficult.  Power diminished bilateral lower extremities. Tone preserved. No fasiculations or tremors.  DTR 2/4 and bilaterally equal.     Medications       divalproex sodium delayed-release  125 mg Oral Q8H MAURICIO     enoxaparin  40 mg Subcutaneous Q24H     gentamicin  1 drop Both Eyes TID     omeprazole  20 mg Oral QAM AC     piperacillin-tazobactam  3.375 g Intravenous Q6H     psyllium  1 packet Oral Daily     senna-docusate  1 tablet Oral BID    Or     senna-docusate  2 tablet Oral BID     vancomycin (VANCOCIN) IV  1,250 mg Intravenous Q18H           Data     Recent Labs  Lab 04/19/18  0446 04/18/18  1710   WBC 6.5 9.0   HGB 13.0* 15.4   MCV 90 88   * 118*    138   POTASSIUM 4.1 3.9   CHLORIDE 107 102   CO2 27 24   BUN 12 14   CR 0.83 0.84   ANIONGAP 9 12   JOCELIN 8.3* 9.1   GLC 91 118*   ALBUMIN  --  3.8   PROTTOTAL  --  7.4   BILITOTAL   --  0.8   ALKPHOS  --  62   ALT  --  14   AST  --  14       Lactic Acid   Date Value Ref Range Status   04/19/2018 2.7 (H) 0.4 - 2.0 mmol/L Final     Comment:     Significant value called to and read back by  DENTON MAN AT 0525 BY TF     04/18/2018 3.2 (H) 0.4 - 2.0 mmol/L Final     Comment:     Significant value called to and read back by  DENTON MAN @ 2011 ON 04/18/2018 BY [facility]     04/18/2018 3.0 (H) 0.4 - 2.0 mmol/L Final     Comment:     Significant value called to and read back by  ARTHUR VILLEGAS @ 1738 ON 4/18/2018 BY [facility]         Recent Results (from the past 24 hour(s))   XR Chest Port 1 View    Narrative    PROCEDURE:  XR CHEST PORT 1 VW    HISTORY:  fever; .     COMPARISON:  7/18/2016    FINDINGS:   The cardiac silhouette is normal in size. The pulmonary vasculature is  normal.  There are airspace opacities in the right lower lung,  increased. There is mild left basilar atelectasis. No pleural effusion  or pneumothorax. Sided cardiac generator device is stable.      Impression    IMPRESSION:  Right basilar infiltrate.      MARIANELA MIRAMONTES MD

## 2018-04-19 NOTE — PROGRESS NOTES
Assessment completed see flowsheet.      LOC: alert    Others present: Patient    Dx: Pneumonia    Lives with: Lives With: facility resident    Living at: Living Arrangements: extended care facility    Support System: Description of Support System: Supportive, Involved    Primary Care Provider: RIDGE Bell/[facility]: No     Health Care Directive: Patient provided one in the past but it was not valid per chart review. 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 Bed being held there per Day, admissions coordinator there.     [place]: YES- [facility] referral [facility], awaiting return call.     Goals: To return to [facility]     Barriers: None identified    TAWNYA: Average    Discharge Plan: Return to [facility], transport by [facility]     PCP is Dr. Bell of [facility], he states he does not remember who his dentist is, and states he has recently seen an eye doctor in [place] but doesn't recall the name/place. Patient denies seeing any specialists. Patient denies any sleep or mood concerns. Patient denies any tobacco, alcohol or other drug use.

## 2018-04-19 NOTE — PROVIDER NOTIFICATION
DATE:  4/18/2018   TIME OF RECEIPT FROM LAB:  2011   LAB TEST:  Lactic acid  LAB VALUE:  3.2  RESULTS GIVEN WITH READ-BACK TO (PROVIDER):  Vladimir Lino MD  TIME LAB VALUE REPORTED TO PROVIDER:   2030

## 2018-04-19 NOTE — PLAN OF CARE
Face to face report given with opportunity to observe patient.    Report given to Ritika BRICENO RN.    Rochelle Lira  4/19/2018, 6:59 PM

## 2018-04-19 NOTE — H&P
[facility]    History and Physical  Hospitalist       Date of Admission:  4/18/2018  Date of Service (when I saw the patient): 4/18/2018     Assessment & Plan   Toi Cowart is a 87 year old male presenting from [facility] with a history of dementia, HTN, CAD, SA node dysfunction s/p PPM and battery change 4/16/2018 who presented on 4/18/2018 with fever and increased confusion.  History is very limited due to his mental status.  The patient had no complaints and looked comfortable eating an apple.     In the ED he was found to have a temp of 103, BP as low as 77/52, WBC 9.0, HgB 15.4, plt 118, glu 118, BUN 14, Cr 0.84, CRP 19.7, Lactic acid 3.0, Procalcitonin 0.62, Influenza A/B ag negative.  CXR showed a RLL infiltrate.    He denied all ROS questions and said  I ask too many questions.    Principal Problem:    HCAP (healthcare-associated pneumonia)  Active Problems:    Cardiac pacemaker, Medtronic, Dual chamber - DEPENDENT    GERD (gastroesophageal reflux disease)    Essential hypertension    CAD (coronary artery disease)    Hypercholesteremia      Principal Problem:    HCAP (healthcare-associated pneumonia)   - admit   - sepsis order set   - broad spectrum abx   - sputum stain and cx if able   - f/u blood cx    Active Problems:    Cardiac pacemaker, Medtronic, Dual chamber - DEPENDENT  Essential hypertension    CAD (coronary artery disease)    Hypercholesteremia     - site looks OK without obvious infection   - not on any cardiac meds      GERD (gastroesophageal reflux disease)   - PPI            DVT Prophylaxis: Enoxaparin (Lovenox) SQ  Code Status: DNR / DNI    Disposition: Expected discharge in 2-4 days    Vladimir Lino    Primary Care Physician   RIDGE Bell    Chief Complaint   Chief Complaint   Patient presents with     Fever     Pacemaker battery changed recently. Fever started today.           History is obtained from the patient  EMR    History of Present Illness   Tio Cowart  is a 87 year old male presenting from [facility] with a history of dementia, HTN, CAD, SA node dysfunction s/p PPM and battery change 4/16/2018 who presented on 4/18/2018 with fever and increased confusion.  History is very limited due to his mental status.  The patient had no complaints and looked comfortable eating an apple.    Past Medical History      Past Medical History:   Diagnosis Date     CAD (coronary artery disease) 4/1/2013    angioplasty, stent     Cardiac pacemaker, Medtronic, Dual chamber 8/14/2012     Cellulitis 3/25/13    Left anterior lower leg     Dementia 1/1/2011     Essential hypertension 4/1/2013     Fatigue 4/1/2013     GERD (gastroesophageal reflux disease) 4/1/2013     Hypercholesteremia 8/30/2011     Inguinal hernia without mention of obstruction or gangrene, bilateral, (not specified as recurrent) 11/6/2003     Osteoarthrosis 1/1/2011     Senile dementia 4/1/2013     Sinoatrial node dysfunction (H) 4/23/2012     Sleep disorder 4/1/2013     Thigh pain 1/1/2011       Past Surgical History     Past Surgical History:   Procedure Laterality Date     C TOTAL HIP ARTHROPLASTY  2007    right     CATARACT IOL, RT/LT  2008    bilateral     Colon resection      colon cancer     COLONOSCOPY  2003     pace maker  2005    complete heart block; DDDR     REPLACE PACEMAKER GENERATOR N/A 4/16/2018    Procedure: REPLACE PACEMAKER GENERATOR;  Pacemaker Generator Change;  Surgeon: Heron Baker MD;  Location: [facility] OR     STENT  1994    angioplasty     STENT, CORONARY, JANINA         Prior to Admission Medications   Prior to Admission Medications   Prescriptions Last Dose Informant Patient Reported? Taking?   CALMOSEPTINE 0.44-20.6 % OINT   No Yes   Sig: USE AS NEEDED.   HYDROcodone-acetaminophen (NORCO) 5-325 MG per tablet Unknown at Unknown time  Yes No   Sig: Take 1 tablet by mouth 2 times daily as needed for severe pain   Rectal Barrier Unknown at Unknown time  No No   Sig: Apply to affected areas as  needed daily   SB IBUPROFEN 200 MG tablet Unknown at Unknown time  No No   Sig: TAKE 1 TABLET EVERY 4 HOURS AS NEEDED FOR PAIN OR FEVER   divalproex (DEPAKOTE SPRINKLE) 125 MG CR capsule Unknown at Unknown time  No No   Sig: TAKE (1) CAPSULE THREE TIMES DAILY.   gentamicin (GARAMYCIN) 0.3 % ophthalmic solution Unknown at Unknown time  Yes No   Sig: Place 1 drop into both eyes every 4 hours   loperamide (IMODIUM) 2 MG capsule   No No   Sig: TAKE 1 CAPSULE FOUR TIMES DAILY FOR DIARRHEA AS NEEDED   nystatin (MYCOSTATIN) 237725 UNIT/GM POWD Unknown at Unknown time  No No   Sig: Apply topically 2 times daily Apply to groin area twice daily.   omeprazole (PRILOSEC) 20 MG CR capsule Unknown at Unknown time  No No   Sig: TAKE 1 CAPSULE DAILY ON EMPTY STOMACH.   psyllium (METAMUCIL/KONSYL) Packet Unknown at Unknown time  Yes No   Sig: Take 1 packet by mouth daily      Facility-Administered Medications: None     Allergies   Allergies   Allergen Reactions     Contrast Dye Unknown     IV dye, iodine containing contrast media     Fluvastatin Sodium Other (See Comments)     Lescol  Memory loss     Lovastatin Other (See Comments)     Memory loss     No Clinical Screening - See Comments      Iv dye       Social History   I have reviewed this patient's social history and updated it with pertinent information if needed. Toi Cowart  reports that he has quit smoking. His smoking use included Cigarettes. He does not have any smokeless tobacco history on file. He reports that he does not drink alcohol.    Family History      Family History   Problem Relation Age of Onset     Cancer - colorectal Father      Dementia Mother      DIABETES Maternal Aunt        Review of Systems   The 10 point Review of Systems is negative other than noted in the HPI or here.       Physical Exam   Temp: 98.8  F (37.1  C) Temp src: Oral BP: 96/58 (2nd liter LR started.)   Heart Rate: 93 Resp: 16 SpO2: 94 % O2 Device: None (Room air)    Vital Signs with  Ranges  Temp:  [98.8  F (37.1  C)-103  F (39.4  C)] 98.8  F (37.1  C)  Heart Rate:  [] 93  Resp:  [16-24] 16  BP: ()/(52-87) 96/58  SpO2:  [94 %-97 %] 94 %  183 lbs 6.76 oz      Constitutional:  Awake, Alert, NAD (was ill appearing in ED)  Eyes:     no injection, no icterus  HEENT:   atraumatic, normocephalic  Respiratory:   Rhonchi at right base  Cardiovascular:  Borderline tachy; No JVD  GI:    soft, NT, ND, + bowel sounds  Skin:    no rashes, no lesions; PPM site healing  Musculoskeletal:  No edema, good tone, no deformities  Neurologic:   oriented x 1, no focal deficits; EOMI  Psychiatric:   appropriate affect      Data   Data reviewed today:  I personally reviewed CXR, labs     Recent Labs  Lab 04/18/18  1710 04/12/18  1345   WBC 9.0 8.2   HGB 15.4 15.2   MCV 88 90   * 147*     --    POTASSIUM 3.9  --    CHLORIDE 102  --    CO2 24  --    BUN 14  --    CR 0.84  --    ANIONGAP 12  --    JOCELIN 9.1  --    *  --    ALBUMIN 3.8  --    PROTTOTAL 7.4  --    BILITOTAL 0.8  --    ALKPHOS 62  --    ALT 14  --    AST 14  --        Recent Results (from the past 24 hour(s))   XR Chest Port 1 View    Narrative    PROCEDURE:  XR CHEST PORT 1 VW    HISTORY:  fever; .     COMPARISON:  7/18/2016    FINDINGS:   The cardiac silhouette is normal in size. The pulmonary vasculature is  normal.  There are airspace opacities in the right lower lung,  increased. There is mild left basilar atelectasis. No pleural effusion  or pneumothorax. Sided cardiac generator device is stable.      Impression    IMPRESSION:  Right basilar infiltrate.      MARIANELA MIRAMONTES MD

## 2018-04-19 NOTE — PHARMACY
[facility]    Pharmacy      Antimicrobial Stewardship Note     Current antimicrobial therapy:  Anti-infectives (Future)    Start     Dose/Rate Route Frequency Ordered Stop    04/19/18 1200  vancomycin (VANCOCIN) 1,250 mg in sodium chloride 0.9 % 250 mL intermittent infusion      1,250 mg  192 mL/hr over 90 Minutes Intravenous EVERY 18 HOURS 04/18/18 2001 04/19/18 1200  piperacillin-tazobactam (ZOSYN) infusion 3.375 g      3.375 g  100 mL/hr over 30 Minutes Intravenous EVERY 6 HOURS 04/19/18 1034            Indication: sepsis, HAP    Days of Therapy: 2     Pertinent labs:  Creatinine   Creatinine   Date Value Ref Range Status   04/19/2018 0.83 0.66 - 1.25 mg/dL Final   04/18/2018 0.84 0.66 - 1.25 mg/dL Final   07/26/2016 0.69 0.66 - 1.25 mg/dL Final     WBC   WBC   Date Value Ref Range Status   04/19/2018 6.5 4.0 - 11.0 10e9/L Final   04/18/2018 9.0 4.0 - 11.0 10e9/L Final   04/12/2018 8.2 4.0 - 11.0 10e9/L Final     Procalcitonin   Procalcitonin   Date Value Ref Range Status   04/19/2018 1.42 ng/ml Final     Comment:     0.50-1.99 ng/ml  Moderate risk of systemic infection.  Recommendation:   Recommend antibiotics. Evaluate culture results and clinical features to   target antibacterial therapy. Obtain blood cultures and other relevant   cultures if not done.  If empiric antibiotics were started, recheck PCT in: 2 days to guide   antibiotic de-escalation.  Discontinue or de-escalate antibiotics when PCT   concentration is <80% of peak or abs PCT <0.5. If empiric antibiotics were NOT   started, recheck PCT in 6-24 hours to re-evaluate need for antibiotics.     04/18/2018 0.62 ng/ml Final     Comment:     0.50-1.99 ng/ml  Moderate risk of systemic infection.  Recommendation:   Recommend antibiotics. Evaluate culture results and clinical features to   target antibacterial therapy. Obtain blood cultures and other relevant   cultures if not done.  If empiric antibiotics were started, recheck PCT in: 2  days to guide   antibiotic de-escalation.  Discontinue or de-escalate antibiotics when PCT   concentration is <80% of peak or abs PCT <0.5. If empiric antibiotics were NOT   started, recheck PCT in 6-24 hours to re-evaluate need for antibiotics.       CRP   CRP Inflammation   Date Value Ref Range Status   04/18/2018 19.7 (H) 0.0 - 8.0 mg/L Final       Culture Results:   7-Day Micro Results      Procedure Component Value Units Date/Time     Sputum Culture Aerobic Bacterial      Order Status: No result Lab Status: No result      Specimen: Sputum      Gram stain      Order Status: No result Lab Status: No result      Specimen: Sputum      Active MRSA Surveillance Culture [[id number]] Collected: 04/18/18 1937     Order Status: Completed Lab Status: Preliminary result Updated: 04/19/18 0642     Specimen: Nares from Nose       Specimen Description Nares      Culture Micro Culture in progress     Blood culture [[id number]] Collected: 04/18/18 1722     Order Status: Completed Lab Status: Preliminary result Updated: 04/19/18 1314     Specimen: Blood       Specimen Description Blood      Special Requests Right Arm      Culture Micro No growth after 19 hours     Influenza A/B antigen [[id number]] Collected: 04/18/18 1715     Order Status: Completed Lab Status: Final result Updated: 04/18/18 1739     Specimen: Nares       Influenza A/B Agn Specimen Nares      Influenza A Negative      Influenza B Negative       Test results must be correlated with clinical data. If necessary, results   should be confirmed by a molecular assay or viral culture.           Blood culture [[id number]] Collected: 04/18/18 1710     Order Status: Completed Lab Status: Preliminary result Updated: 04/19/18 1314     Specimen: Blood       Specimen Description Blood      Special Requests Left Arm      Culture Micro No growth after 19 hours     Blood culture      Order Status: Canceled Lab Status: No result      Specimen: Blood                Recommendations/Interventions:  1. None at this time.    Ondina Talley, [facility]  April 19, 2018

## 2018-04-19 NOTE — PLAN OF CARE
Problem: Patient Care Overview  Goal: Plan of Care/Patient Progress Review  Outcome: Improving   04/19/18 0522   OTHER   Plan Of Care Reviewed With patient;spouse   Plan of Care Review   Progress improving     Pt admitted to floor for treatment of pneumonia with elevated lactic acid.  Pt remains on room air throughout the night.  Pt continues on IV abx.  Pt lactic acid continues to trend down.  Pt able to turn and repo independently, was cleansed of incontinence multiple times throughout night, urine only, no BM.  No noted cough.      Problem: Pneumonia (Adult)  Goal: Signs and Symptoms of Listed Potential Problems Will be Absent, Minimized or Managed (Pneumonia)  Signs and symptoms of listed potential problems will be absent, minimized or managed by discharge/transition of care (reference Pneumonia (Adult) CPG).   Outcome: Improving   04/19/18 0522   Pneumonia   Problems Assessed (Pneumonia) all   Problems Present (Pneumonia) infection progression       Face to face report given with opportunity to observe patient.    Report given to Daija Durham   4/19/2018  7:21 AM

## 2018-04-19 NOTE — PLAN OF CARE
Updated Samantha from [facility] regarding patients status; he will have at least one more night here with IV ABX and IV fluids per Dr. Raymond.

## 2018-04-19 NOTE — PLAN OF CARE
Problem: Patient Care Overview  Goal: Plan of Care/Patient Progress Review  Outcome: No Change  Right mid and lower lobes have fine crackles.  Did get up into chair X2 this shift.  Is a heavy assist of 2 with a gait belt.  Scab to pacemaker site intact with no drainage.  Is incontinent of urine and unable to get UA sample.  Skin was assessed by skin team see note.  IV patent and changed to SL'd.  Wife here now visiting patient, alarms on for safety.   Face to face report given with opportunity to observe patient.    Report given to RONDA Byrd   4/19/2018  3:55 PM        Problem: Pneumonia (Adult)  Goal: Signs and Symptoms of Listed Potential Problems Will be Absent, Minimized or Managed (Pneumonia)  Signs and symptoms of listed potential problems will be absent, minimized or managed by discharge/transition of care (reference Pneumonia (Adult) CPG).   Outcome: No Change   04/19/18 1322   Pneumonia   Problems Assessed (Pneumonia) all   Problems Present (Pneumonia) infection progression;respiratory compromise

## 2018-04-19 NOTE — PLAN OF CARE
Problem: Patient Care Overview  Goal: Plan of Care/Patient Progress Review  Outcome: No Change  Per Dr Raymond Pt will be d/c'd back to SNF, OT evaluation not indicated at this time. Will complete orders.

## 2018-04-19 NOTE — PLAN OF CARE
[facility] Inpatient Admission Note:    Patient admitted to 3122/3122-1 at approximately 1854 via cart accompanied by transport tech from emergency room . Report received from leta in SBAR format at 1854 via telephone. Patient transferred to bed via slide board.. Patient is alert and oriented X 1, reports pain; rates at 5 on 0-10 scale.  Patient oriented to room, unit, hourly rounding, and plan of care. Explained admission packet and patient handbook with patient bill of rights brochure. Will continue to monitor and document as needed.     Inpatient Nursing criteria listed below was met:      Health care directives status obtained and documented: Yes      Care Everywhere authorization obtained No      MRSA swab completed for patient 65 years and older: Yes      Patient identifies a surrogate decision maker: Yes If yes, who:varun wife Contact Information:see facesheet/whiteboard      Core Measure diagnosis present:Yes. If yes, state diagnosis: Sepsis Pneumonia       Is initial lactic acid >2.0? Yes.       Vaccination assessment and education completed: Yes   Influenza(seasonal)  YES   Vaccination(s) ordered: not given today because up to date      Clergy visit ordered if patient requests: N/A      Skin issues/needs documented: N/A      Isolation Patient: no Education given, correct sign in place and documentation row added to PCS:  No      Fall Prevention Yes: Care plan updated, education given and documented, sticker and magnet in place: Yes      Care Plan initiated: Yes      Education Documented (including assessment): Yes      Patient has discharge needs : No If yes, please explain:n/a

## 2018-04-19 NOTE — PROVIDER NOTIFICATION
DATE:  4/19/2018   TIME OF RECEIPT FROM LAB:  0525  LAB TEST:  Lactic acid  LAB VALUE:  2.7  RESULTS GIVEN WITH READ-BACK TO (PROVIDER):  Vladimir Lino MD  TIME LAB VALUE REPORTED TO PROVIDER:   0537

## 2018-04-19 NOTE — PROGRESS NOTES
Per Dr.Mc Gonzalez client will go back to [facility] and no need for eval. WIll D/C this order. Estelle Ireland

## 2018-04-20 ENCOUNTER — APPOINTMENT (OUTPATIENT)
Dept: GENERAL RADIOLOGY | Facility: HOSPITAL | Age: 83
DRG: 871 | End: 2018-04-20
Attending: INTERNAL MEDICINE
Payer: COMMERCIAL

## 2018-04-20 VITALS
DIASTOLIC BLOOD PRESSURE: 74 MMHG | OXYGEN SATURATION: 97 % | SYSTOLIC BLOOD PRESSURE: 165 MMHG | BODY MASS INDEX: 28.65 KG/M2 | TEMPERATURE: 98.1 F | HEIGHT: 67 IN | WEIGHT: 182.54 LBS | RESPIRATION RATE: 18 BRPM

## 2018-04-20 LAB
ANION GAP SERPL CALCULATED.3IONS-SCNC: 9 MMOL/L (ref 3–14)
BACTERIA SPEC CULT: NORMAL
BASOPHILS # BLD AUTO: 0 10E9/L (ref 0–0.2)
BASOPHILS NFR BLD AUTO: 0.8 %
BUN SERPL-MCNC: 10 MG/DL (ref 7–30)
CALCIUM SERPL-MCNC: 8.5 MG/DL (ref 8.5–10.1)
CHLORIDE SERPL-SCNC: 107 MMOL/L (ref 94–109)
CO2 SERPL-SCNC: 27 MMOL/L (ref 20–32)
CREAT SERPL-MCNC: 0.85 MG/DL (ref 0.66–1.25)
DIFFERENTIAL METHOD BLD: ABNORMAL
EOSINOPHIL # BLD AUTO: 0.3 10E9/L (ref 0–0.7)
EOSINOPHIL NFR BLD AUTO: 6.4 %
ERYTHROCYTE [DISTWIDTH] IN BLOOD BY AUTOMATED COUNT: 13.8 % (ref 10–15)
GFR SERPL CREATININE-BSD FRML MDRD: 85 ML/MIN/1.7M2
GLUCOSE SERPL-MCNC: 80 MG/DL (ref 70–99)
HCT VFR BLD AUTO: 38 % (ref 40–53)
HGB BLD-MCNC: 12.8 G/DL (ref 13.3–17.7)
IMM GRANULOCYTES # BLD: 0 10E9/L (ref 0–0.4)
IMM GRANULOCYTES NFR BLD: 0.4 %
LYMPHOCYTES # BLD AUTO: 1.2 10E9/L (ref 0.8–5.3)
LYMPHOCYTES NFR BLD AUTO: 26.3 %
MAGNESIUM SERPL-MCNC: 2 MG/DL (ref 1.6–2.3)
MCH RBC QN AUTO: 30.3 PG (ref 26.5–33)
MCHC RBC AUTO-ENTMCNC: 33.7 G/DL (ref 31.5–36.5)
MCV RBC AUTO: 90 FL (ref 78–100)
MONOCYTES # BLD AUTO: 0.4 10E9/L (ref 0–1.3)
MONOCYTES NFR BLD AUTO: 8.5 %
NEUTROPHILS # BLD AUTO: 2.7 10E9/L (ref 1.6–8.3)
NEUTROPHILS NFR BLD AUTO: 57.6 %
NRBC # BLD AUTO: 0 10*3/UL
NRBC BLD AUTO-RTO: 0 /100
PLATELET # BLD AUTO: 102 10E9/L (ref 150–450)
POTASSIUM SERPL-SCNC: 3.8 MMOL/L (ref 3.4–5.3)
RBC # BLD AUTO: 4.22 10E12/L (ref 4.4–5.9)
SODIUM SERPL-SCNC: 143 MMOL/L (ref 133–144)
SPECIMEN SOURCE: NORMAL
WBC # BLD AUTO: 4.7 10E9/L (ref 4–11)

## 2018-04-20 PROCEDURE — 80048 BASIC METABOLIC PNL TOTAL CA: CPT | Performed by: INTERNAL MEDICINE

## 2018-04-20 PROCEDURE — 25000128 H RX IP 250 OP 636: Performed by: INTERNAL MEDICINE

## 2018-04-20 PROCEDURE — 25000132 ZZH RX MED GY IP 250 OP 250 PS 637: Performed by: INTERNAL MEDICINE

## 2018-04-20 PROCEDURE — 99239 HOSP IP/OBS DSCHRG MGMT >30: CPT | Performed by: INTERNAL MEDICINE

## 2018-04-20 PROCEDURE — 36415 COLL VENOUS BLD VENIPUNCTURE: CPT | Performed by: INTERNAL MEDICINE

## 2018-04-20 PROCEDURE — 71045 X-RAY EXAM CHEST 1 VIEW: CPT | Mod: TC

## 2018-04-20 PROCEDURE — 83735 ASSAY OF MAGNESIUM: CPT | Performed by: INTERNAL MEDICINE

## 2018-04-20 PROCEDURE — 85025 COMPLETE CBC W/AUTO DIFF WBC: CPT | Performed by: INTERNAL MEDICINE

## 2018-04-20 RX ADMIN — VANCOMYCIN HYDROCHLORIDE 1250 MG: 1 INJECTION, POWDER, LYOPHILIZED, FOR SOLUTION INTRAVENOUS at 06:06

## 2018-04-20 RX ADMIN — DIVALPROEX SODIUM 125 MG: 125 CAPSULE, COATED PELLETS ORAL at 09:51

## 2018-04-20 RX ADMIN — OMEPRAZOLE 20 MG: 20 CAPSULE, DELAYED RELEASE ORAL at 09:50

## 2018-04-20 RX ADMIN — TAZOBACTAM SODIUM AND PIPERACILLIN SODIUM 3.38 G: 375; 3 INJECTION, SOLUTION INTRAVENOUS at 05:29

## 2018-04-20 RX ADMIN — GENTAMICIN SULFATE 1 DROP: 3 SOLUTION/ DROPS OPHTHALMIC at 11:27

## 2018-04-20 RX ADMIN — AMOXICILLIN AND CLAVULANATE POTASSIUM 1 TABLET: 875; 125 TABLET, FILM COATED ORAL at 09:50

## 2018-04-20 RX ADMIN — SENNOSIDES AND DOCUSATE SODIUM 1 TABLET: 8.6; 5 TABLET ORAL at 09:51

## 2018-04-20 RX ADMIN — OXYCODONE HYDROCHLORIDE 5 MG: 5 TABLET ORAL at 09:50

## 2018-04-20 RX ADMIN — PSYLLIUM HUSK 1 PACKET: 3.4 POWDER ORAL at 09:50

## 2018-04-20 NOTE — PLAN OF CARE
Face to face report given with opportunity to observe patient.    Report given to Rochelle Segura   4/20/2018  7:19 AM

## 2018-04-20 NOTE — PHARMACY
[facility]    Pharmacy      Antimicrobial Stewardship Note     Current antimicrobial therapy:  Anti-infectives (Future)    Start     Dose/Rate Route Frequency Ordered Stop    04/20/18 0800  amoxicillin-clavulanate (AUGMENTIN) 875-125 MG per tablet 1 tablet      1 tablet Oral EVERY 12 HOURS SCHEDULED 04/20/18 0721 04/30/18 0759          Indication: HAP, questionable aspiration pneumonitis    Days of Therapy: 1     Pertinent labs:  Creatinine   Creatinine   Date Value Ref Range Status   04/20/2018 0.85 0.66 - 1.25 mg/dL Final   04/19/2018 0.83 0.66 - 1.25 mg/dL Final   04/18/2018 0.84 0.66 - 1.25 mg/dL Final     WBC   WBC   Date Value Ref Range Status   04/20/2018 4.7 4.0 - 11.0 10e9/L Final   04/19/2018 6.5 4.0 - 11.0 10e9/L Final   04/18/2018 9.0 4.0 - 11.0 10e9/L Final     Procalcitonin   Procalcitonin   Date Value Ref Range Status   04/19/2018 1.42 ng/ml Final     Comment:     0.50-1.99 ng/ml  Moderate risk of systemic infection.  Recommendation:   Recommend antibiotics. Evaluate culture results and clinical features to   target antibacterial therapy. Obtain blood cultures and other relevant   cultures if not done.  If empiric antibiotics were started, recheck PCT in: 2 days to guide   antibiotic de-escalation.  Discontinue or de-escalate antibiotics when PCT   concentration is <80% of peak or abs PCT <0.5. If empiric antibiotics were NOT   started, recheck PCT in 6-24 hours to re-evaluate need for antibiotics.     04/18/2018 0.62 ng/ml Final     Comment:     0.50-1.99 ng/ml  Moderate risk of systemic infection.  Recommendation:   Recommend antibiotics. Evaluate culture results and clinical features to   target antibacterial therapy. Obtain blood cultures and other relevant   cultures if not done.  If empiric antibiotics were started, recheck PCT in: 2 days to guide   antibiotic de-escalation.  Discontinue or de-escalate antibiotics when PCT   concentration is <80% of peak or abs PCT <0.5. If empiric  antibiotics were NOT   started, recheck PCT in 6-24 hours to re-evaluate need for antibiotics.       CRP   CRP Inflammation   Date Value Ref Range Status   04/18/2018 19.7 (H) 0.0 - 8.0 mg/L Final       Culture Results:      Recommendations/Interventions:  1. none    Naila Awad, [facility]  April 20, 2018

## 2018-04-20 NOTE — PLAN OF CARE
Problem: Patient Care Overview  Goal: Plan of Care/Patient Progress Review  Outcome: No Change  Orientated to self only. Pressures high at start of night but have come down to 140's systolic. Remains on RA. Medicated with 1 Oxi for FLACC score of 5/10. Lungs clear throughout. Zosyn and Vanco continue IV. Incontinent of bowel and bladder. PO intake encouraged. Patient has been independently rolling self in bed. Skin remains intact on buttocks.

## 2018-04-20 NOTE — PLAN OF CARE
Problem: Patient Care Overview  Goal: Plan of Care/Patient Progress Review  Outcome: Adequate for Discharge Date Met: 04/20/18  VSS  Afebrile.  O2 sats 96% on room air.  Being discharged to NH at 1130.  Roxicodone given for c/o back pain and hurting all over.  Up in chair this morning for breakfast then returned to bed, bathed and dressed for discharge.

## 2018-04-20 NOTE — PLAN OF CARE
Patient discharged at 12:24 PM via wheel chair accompanied by other:[facility]  and staff. Prescriptions sent to patients preferred pharmacy. All belongings sent with patient.     Discharge instructions reviewed with nursing home. Listed belongings gathered and returned to patient. Clothing, eye drops    Patient discharged to nursing home.   Report called to Nursing Home:      Core Measures and Vaccines  Core Measures applicable during stay: Yes. If yes, state diagnosis: pneumonia  Pneumonia and Influenza given prior to discharge, if indicated: N/A    Surgical Patient   Surgical Procedures during stay: none  Did patient receive discharge instruction on wound care and recognition of infection symptoms? N/A    MISC  Follow up appointment made:  No  Home and hospital aquired medications returned to patient: Yes  Patient reports pain was well managed at discharge: Yes

## 2018-04-20 NOTE — PROGRESS NOTES
Name: Toi Cowart    MRN#: [id number]    Reason for Hospitalization: Healthcare-associated pneumonia [J18.9]    Discharge Date: 4/20/2018    Patient / Family response to discharge plan: Agreed     Follow-Up Appt: No future appointments.    Other Providers (Care Coordinator, County Services, PCA services etc): Yes: [facility]- discharge orders sent via fax    Discharge Disposition: long term care facility, transport by [facility]. Ride authorized by [employer], [id number].    Shirlene Fontenot

## 2018-04-20 NOTE — DISCHARGE SUMMARY
[facility]    Discharge Summary  Hospitalist    Date of Admission:  4/18/2018  Date of Discharge:  4/20/2018 12:20 PM  Discharging Provider: Damir Tsang  Date of Service (when I saw the patient): 04/20/18    Discharge Diagnoses   Probable bacterial pneumonia  Possible aspiration pneumonitis  Transient severe sepsis, resolved  Acute hypoxemic respiratory failure, resolved  Recent permanent pacemaker generator change  Established gastroesophageal reflux  Established coronary artery disease  Established dementia    History of Present Illness   Toi Cowart is a 87 year old  man who was admitted on 4/18/2018.  He presented [facility] where fever and increased confusion was noted.  History significant for a established dementia, HTN, CAD, SA node dysfunction with PPM and battery change 4/16/2018.  Overall on initial evaluation he appeared quite comfortable.  The morning following admission he did note some dyspnea initially which he believes has fully resolved.  Mild tachypnea on presentation without significant hypoxemia.    Hospital Course   Toi Cowart was admitted on 4/18/2018.  The following problems were addressed during his hospitalization:    1. Acute hypoxemic respiratory failure  Possible severe sepsis  Although has not required oxygen supplementation oxygenation has improved somewhat since his initial presentation.  He first showed some tachycardia and tachypnea consistent with respiratory failure improving relatively rapidly.  Please see below for considerations of pulmonary infection.  Felt to represent severe sepsis with a mild encephalopathy given confusion in the face of his established dementia as well as elevated lactate.  Lactate elevation may represent his tachypnea and respiratory failure alone however.  Any established severe sepsis was transient and appears to be fully resolved.  Had planned to further lactate measurements to ensure clearance but intravenous access  was been extremely limited and decided early morning following admission that the burden of multiple attempts at vascular access greater than any benefit.    In any event he has shown continued clinical impression.  As above any severe sepsis has resolved.  2.  Pneumonia  In fact somewhat difficult to establish unequivocal bacterial pneumonia.  He had concerns for an aspiration pneumonitis in the past.  He does show a right lower lobe airspace infiltrate although this would be consistent with inflammatory noninfectious pneumonitis or an infectious process.    Repeat radiograph day of discharge does show scattered airspace infiltrate although certainly not more prominent and likely slightly improved although formal interpretation suggests no significant radiographic change.  Pro-calcitonin has shown an increase since his presentation although this does not reliably differentiate between an infectious and inflammatory parenchymal infiltrate.  He is at some increased risk of resistant organisms as he is a resident in a nursing home.  He also recently had a pacemaker battery changed although the site looks excellent without evidence of any localized inflammation or infection.  Any event continued gram-positive antibiotic treatment with vancomycin for approximately 24 hours subsequently discontinued.  Cultures remain unrevealing.  Change piperacillin/tazobactam to oral Augmentin given some continued concerns for need for anaerobic spectrum of treatment.  Significant fevers at least initially which again did not help distinguish between a noninfectious inflammatory than infectious process.    3.  Permanent pacemaker placement  Recent battery change.  SA node dysfunction as indication for pacemaker placement.  Pacemaker function is appeared to be intact throughout this hospitalization.  4.  Established gastroesophageal reflux  Continued cyto-protection with oral PPI.  5.  Coronary artery disease  No evidence of any active  "ischemia.  6.  Dementia  Suspect this is now at baseline.  As noted some interval acute confusion but now appears to have resolved.    Damir Tsang    Pending Results   These results will be followed up by Dr. Bell  Unresulted Labs Ordered in the Past 30 Days of this Admission     Date and Time Order Name Status Description    4/18/2018 1711 Blood culture Preliminary     4/18/2018 1700 Blood culture Preliminary           Code Status   DNR / DNI       Primary Care Physician   RIDGE Bell    Vital signs:  Temp: 98.1  F (36.7  C) Temp src: Temporal BP: 165/74   Heart Rate: 71 Resp: 18 SpO2: 97 % O2 Device: None (Room air)   Height: 170.2 cm (5' 7\") Weight: 82.8 kg (182 lb 8.7 oz)  Estimated body mass index is 28.59 kg/(m^2) as calculated from the following:    Height as of this encounter: 1.702 m (5' 7\").    Weight as of this encounter: 82.8 kg (182 lb 8.7 oz).        Awake, alert, oriented to person, generically to place  HEENT: Pupils equal, conjugate. No icterus or nystagmus. Oral mucosa moist. No facial asymmetry.   Neck: Supple, jugular veins not elevated. Trachea midline   Chest: No chest wall movement asymmetry. Aeration minimally diminished at bases. Accessory muscles not in use. Expiratory time not increased. No tidal wheezes.  Few scattered basilar rhonchi. No discrete crackles.   Cardiac: PMI not displaced. S1, S2 unremarkable. No S3, S4. P2 not accentuated.   Abdomen: Soft. No palpation or percussion tenderness. No distention. Normoactive bowel sounds.  Extremities: No lower extremity edema.  Extremities warm distally.  No rashes.  No eccymoses, clubbing.   Neurologic: Mental state above.  Equal movement of all extremities. Tone preserved. No fasiculations or tremors.   # Discharge Pain Plan:   - Patient currently has NO PAIN and is not being prescribed pain medications on discharge.      Discharge Disposition   Discharged to nursing home  Condition at discharge: Stable    Consultations This " Hospital Stay   PHARMACY TO DOSE [facility]  SOCIAL WORK IP CONSULT  PHYSICAL THERAPY ADULT IP CONSULT  OCCUPATIONAL THERAPY ADULT IP CONSULT  PHARMACY TO DOSE [facility]  SPEECH LANGUAGE PATH ADULT IP CONSULT    Time Spent on this Encounter   I, Damir Tsang, personally saw the patient today and spent greater than 30 minutes discharging this patient.    Discharge Orders     XR Chest 2 Views     General info for SNF   Length of Stay Estimate: Long Term Care  Condition at Discharge: Improving  Level of care:board and care  Rehabilitation Potential: Fair  Admission H&P remains valid and up-to-date: Yes  Recent Chemotherapy: N/A  Use Nursing Home Standing Orders: Yes     Mantoux instructions   Give two-step Mantoux (PPD) Per Facility Policy Yes     Reason for your hospital stay   Toi Cowart is a 87 year old  man who was admitted on 4/18/2018.  He presented from [facility] where fever and increased confusion was noted.  History significant for established dementia, HTN, CAD, SA node dysfunction with PPM and battery change 4/16/2018.  He showed mild tachypnea on presentation without significant hypoxemia.  He responded to usual therapy including antibiotics and supportive measures.  Diagnostic considerations include infectious pneumonia versus noninfectious aspiration pneumonitis.  Mental status at the time of discharge likely nearly at his baseline.  Discharge to complete a course of Augmentin.     Activity - Up with nursing assistance     Additional Discharge Instructions   Aspiration precautions     Follow Up and recommended labs and tests   Follow up with Nursing home physician.    CXR requested in 4-6 weeks     DNR/DNI     Speech Language Path Adult Consult   Evaluate and treat as clinically indicated.    Reason:  Please evaluate swallowing function and aspiration risk     Fall precautions     Advance Diet as Tolerated   Follow this diet upon discharge: Orders Placed This Encounter     Regular Diet Adult        Discharge Medications   Discharge Medication List as of 4/20/2018 11:31 AM      START taking these medications    Details   amoxicillin-clavulanate (AUGMENTIN) 875-125 MG per tablet Take 1 tablet by mouth every 12 hours, Disp-10 tablet, R-0, Transitional         CONTINUE these medications which have NOT CHANGED    Details   divalproex (DEPAKOTE SPRINKLE) 125 MG CR capsule TAKE (1) CAPSULE THREE TIMES DAILY., Disp-84 capsule, R-0, E-Prescribe      HYDROcodone-acetaminophen (NORCO) 5-325 MG per tablet Take 1 tablet by mouth 2 times daily as needed for severe pain, Historical      omeprazole (PRILOSEC) 20 MG CR capsule TAKE 1 CAPSULE DAILY ON EMPTY STOMACH., Disp-28 capsule, R-11, E-Prescribe      CALMOSEPTINE 0.44-20.6 % OINT USE AS NEEDED., Disp-113 g, R-5, E-Prescribe      gentamicin (GARAMYCIN) 0.3 % ophthalmic solution Place 1 drop into both eyes 3 times daily , Historical      loperamide (IMODIUM) 2 MG capsule TAKE 1 CAPSULE FOUR TIMES DAILY FOR DIARRHEA AS NEEDED, Disp-120 capsule, R-0, E-Prescribe      nystatin (MYCOSTATIN) 770202 UNIT/GM POWD Apply topically 2 times daily Apply to groin area twice daily.Disp-60 g, Q-4T-Atabervbh      psyllium (METAMUCIL/KONSYL) Packet Take 1 packet by mouth daily, Historical      Rectal Barrier Apply to affected areas as needed daily, Disp-90 g, R-0, No Print Out      SB IBUPROFEN 200 MG tablet TAKE 1 TABLET EVERY 4 HOURS AS NEEDED FOR PAIN OR FEVER, Disp-100 tablet, R-5, E-Prescribe           Allergies   Allergies   Allergen Reactions     Contrast Dye Unknown     IV dye, iodine containing contrast media     Fluvastatin Sodium Other (See Comments)     Lescol  Memory loss     Lovastatin Other (See Comments)     Memory loss     No Clinical Screening - See Comments      Iv dye     Data   Most Recent 3 CBC's:  Recent Labs   Lab Test  04/20/18   0439  04/19/18   0446  04/18/18   1710   WBC  4.7  6.5  9.0   HGB  12.8*  13.0*  15.4   MCV  90  90  88   PLT  102*  100*  118*       Most Recent 3 BMP's:  Recent Labs   Lab Test  04/20/18   0439  04/19/18   0446  04/18/18   1710   NA  143  143  138   POTASSIUM  3.8  4.1  3.9   CHLORIDE  107  107  102   CO2  27  27  24   BUN  10  12  14   CR  0.85  0.83  0.84   ANIONGAP  9  9  12   JOCELIN  8.5  8.3*  9.1   GLC  80  91  118*     Most Recent 2 LFT's:  Recent Labs   Lab Test  04/18/18   1710  04/21/17   1125   AST  14  143*   ALT  14  403*   ALKPHOS  62  405*   BILITOTAL  0.8  2.5*     Most Recent INR's and Anticoagulation Dosing History:  Anticoagulation Dose History     Recent Dosing and Labs Latest Ref Rng & Units 10/30/2015 7/13/2016    INR 0.80 - 1.20 1.28(H) 1.20        Most Recent 3 Troponin's:  Recent Labs   Lab Test  07/14/16   0452  07/13/16   2240   TROPI  0.039  0.037     Most Recent Cholesterol Panel:  Recent Labs   Lab Test  05/16/14   0909   CHOL  149   LDL  77   HDL  34*   TRIG  189*     Most Recent 6 Bacteria Isolates From Any Culture (See EPIC Reports for Culture Details):  Recent Labs   Lab Test  04/18/18   1937  04/18/18   1722  04/18/18   1710  07/17/16   1802  07/17/16   1104  07/17/16   0800   CULT  No MRSA isolated  No growth after 2 days  No growth after 2 days  No growth after 6 days  Light growth Normal respiratory opal  >10 Squamous epithelial cells/low power field indicates oral contamination.   Please recollect.  Canceled, Test credited  *     Most Recent TSH, T4 and A1c Labs:No lab results found.  Results for orders placed or performed during the hospital encounter of 04/18/18   XR Chest Port 1 View    Narrative    PROCEDURE:  XR CHEST PORT 1 VW    HISTORY:  fever; .     COMPARISON:  7/18/2016    FINDINGS:   The cardiac silhouette is normal in size. The pulmonary vasculature is  normal.  There are airspace opacities in the right lower lung,  increased. There is mild left basilar atelectasis. No pleural effusion  or pneumothorax. Sided cardiac generator device is stable.      Impression    IMPRESSION:  Right basilar  infiltrate.      MARIANELA MIRAMONTES MD   XR Chest Port 1 View    Narrative    XR CHEST PORT 1 VW    HISTORY: 87 yearsMale infiltrate;    TECHNIQUE: A single view of the chest was performed    COMPARISON: 4/18/2018    FINDINGS: Heart size and pulmonary vascularity are within normal  limits. Lungs are clear. No consolidating airspace opacities are  present. There is mild unchanged right basilar opacity. There is  questionable opacity at the medial left lung base. Question  atelectasis versus pneumonia.      Impression    IMPRESSION: No significant interval change.    LEXIE RIVERA MD

## 2018-04-24 LAB
BACTERIA SPEC CULT: NORMAL
BACTERIA SPEC CULT: NORMAL
Lab: NORMAL
Lab: NORMAL
SPECIMEN SOURCE: NORMAL
SPECIMEN SOURCE: NORMAL

## 2018-05-02 ENCOUNTER — NURSING HOME DICTATION (OUTPATIENT)
Dept: FAMILY MEDICINE | Facility: OTHER | Age: 83
End: 2018-05-02

## 2018-05-02 NOTE — PROGRESS NOTES
Visit Date:   2018      SUBJECTIVE:  The patient recently was hospitalized with an acute infectious process.  After having his pacemaker battery changed recently.  He is back here now, seems to be at baseline.      OBJECTIVE:   VITAL SIGNS:  Stable.   CARDIAC:  Regular.   LUNGS:  Clear.   ABDOMEN:  Soft, no peritoneal signs.     On his chest, the scar from the recent pacemaker battery change is healing nicely.      ASSESSMENT:   1.  History of dementia.   2.  History of recent febrile process, seems to be back at baseline, had a recent pacemaker battery change with a history of sick sinus syndrome and hypertension.     3.  Also, in the past had cholecystitis.  Though his abdominal pain reoccurs at times he has no pain now.      PLAN:  We will check liver function tests.  Change to a low-fat diet and discontinue his ibuprofen and change the Prilosec to 10 mg daily.  Chart and meds reviewed.  Otherwise keep with current plans.         RIDGE QUINN MD             D: 2018   T: 2018   MT: CC      Name:     ESA LEON   MRN:      [id number]        Account:      [id number]   :      1930           Visit Date:   2018      Document: [id number]       cc: [facility]

## 2018-06-13 ENCOUNTER — NURSING HOME DICTATION (OUTPATIENT)
Dept: FAMILY MEDICINE | Facility: OTHER | Age: 83
End: 2018-06-13

## 2018-06-13 NOTE — PROGRESS NOTES
Visit Date:   2018      SUBJECTIVE:  Staff reports no new issues.      PHYSICAL EXAMINATION:   VITAL SIGNS:  Pulse is 80, temperature 98, respirations 18, sat 95 on room air, blood pressure 132/78, weight is 174.2.   CARDIAC:  Regular.   LUNGS:  Clear.   ABDOMEN:  Soft.      ASSESSMENT:   1.  Progressive dementia.   2.  Recently had a hospitalization for febrile illness.  He seems to be back to baseline.     3.  Recently had pacemaker battery changed.  Has abdominal aorta history of sick sinus syndrome and hypertension and remote history of cholecystitis.      PLAN:  Chart and meds reviewed.  Will continue with current plans.  He did have LFTs done on , they were all normal.         RIDGE QUINN MD             D: 2018   T: 2018   MT: ELISHA      Name:     ESA LEON   MRN:      [id number]        Account:      [id number]   :      1930           Visit Date:   2018      Document: [id number]       cc: [facility]

## 2018-06-20 DIAGNOSIS — K21.9 GASTROESOPHAGEAL REFLUX DISEASE WITHOUT ESOPHAGITIS: ICD-10-CM

## 2018-06-20 DIAGNOSIS — F02.80 DEMENTIA DUE TO MEDICAL CONDITION WITHOUT BEHAVIORAL DISTURBANCE (H): ICD-10-CM

## 2018-06-20 RX ORDER — DIVALPROEX SODIUM 125 MG/1
CAPSULE, COATED PELLETS ORAL
Qty: 84 CAPSULE | Refills: 0 | Status: SHIPPED | OUTPATIENT
Start: 2018-06-20 | End: 2019-01-01

## 2018-06-20 NOTE — TELEPHONE ENCOUNTER
omeprazole (PRILOSEC) 20 MG CR capsule    Last Written Prescription Date:  7/24/17  Last Fill Quantity: 28,   # refills: 11  Last Office Visit: 6/13/18  Future Office visit:           divalproex (DEPAKOTE SPRINKLE) 125 MG CR capsule  Last Written Prescription Date:  6/6/17  Last Fill Quantity: 84,   # refills: 0  Last Office Visit: 6/13/18  Future Office visit:

## 2018-07-18 NOTE — MR AVS SNAPSHOT
"              After Visit Summary   2018    Toi Cowart    MRN: [id number]           Patient Information     Date Of Birth          1930        Visit Information        Provider Department      2018 6:00 AM  ICD REMOTE [facility]        Today's Diagnoses     Sinoatrial node dysfunction (H)    -  1       Follow-ups after your visit        Who to contact     If you have questions or need follow up information about today's clinic visit or your schedule please contact [facility] directly at [phone].  Normal or non-critical lab and imaging results will be communicated to you by Gummiihart, letter or phone within 4 business days after the clinic has received the results. If you do not hear from us within 7 days, please contact the clinic through Gummiihart or phone. If you have a critical or abnormal lab result, we will notify you by phone as soon as possible.  Submit refill requests through SOAMAI or call your pharmacy and they will forward the refill request to us. Please allow 3 business days for your refill to be completed.          Additional Information About Your Visit        MyChart Information     SOAMAI lets you send messages to your doctor, view your test results, renew your prescriptions, schedule appointments and more. To sign up, go to [email or web address] . Click on \"Log in\" on the left side of the screen, which will take you to the Welcome page. Then click on \"Sign up Now\" on the right side of the page.     You will be asked to enter the access code listed below, as well as some personal information. Please follow the directions to create your username and password.     Your access code is: [email or web address]  Expires: 2018  1:55 PM     Your access code will  in 90 days. If you need help or a new code, please call your [place] clinic or [phone].        Care EveryWhere ID     This is your Care EveryWhere ID. This could be used by other organizations to " access your [place] medical records  [id number]         Blood Pressure from Last 3 Encounters:   04/20/18 165/74   04/16/18 134/72   01/27/17 106/54    Weight from Last 3 Encounters:   04/20/18 82.8 kg (182 lb 8.7 oz)   04/16/18 78.5 kg (173 lb)   07/27/16 75.5 kg (166 lb 7.2 oz)              We Performed the Following     (39202)INTERROGATION DEVICE EVAL REMOTE, PACER/ICD        Primary Care Provider Office Phone # Fax #    R Julio Bell [phone]       [address] [place] [address]        Equal Access to Services     KIRAN RÍOS : Hadii aad ku hadasho Soloretta, waaxda luqadaha, qaybta kaalmada adeegyada, estiven dawson . So [facility] [phone].    ATENCIÓN: Si habla español, tiene a lagos disposición servicios gratuitos de asistencia lingüística. Llame al [phone].    We comply with applicable federal civil rights laws and [place] laws. We do not discriminate on the basis of race, color, national origin, age, disability, sex, sexual orientation, or gender identity.            Thank you!     Thank you for choosing [facility]  for your care. Our goal is always to provide you with excellent care. Hearing back from our patients is one way we can continue to improve our services. Please take a few minutes to complete the written survey that you may receive in the mail after your visit with us. Thank you!             Your Updated Medication List - Protect others around you: Learn how to safely use, store and throw away your medicines at www.disposemymeds.org.          This list is accurate as of 7/18/18 11:59 PM.  Always use your most recent med list.                   Brand Name Dispense Instructions for use Diagnosis    amoxicillin-clavulanate 875-125 MG per tablet    AUGMENTIN    10 tablet    Take 1 tablet by mouth every 12 hours    Healthcare-associated pneumonia, Aspiration pneumonia of both lungs, unspecified aspiration pneumonia type,  unspecified part of lung (H)       CALMOSEPTINE 0.44-20.6 % Oint   Generic drug:  Menthol-Zinc Oxide     113 g    USE AS NEEDED.    Skin irritation       divalproex sodium delayed-release 125 MG DR capsule    DEPAKOTE SPRINKLE    84 capsule    TAKE (1) CAPSULE THREE TIMES DAILY.    Dementia due to medical condition without behavioral disturbance       gentamicin 0.3 % ophthalmic solution    GARAMYCIN     Place 1 drop into both eyes 3 times daily        HYDROcodone-acetaminophen 5-325 MG per tablet    NORCO     Take 1 tablet by mouth 2 times daily as needed for severe pain        loperamide 2 MG capsule    IMODIUM    120 capsule    TAKE 1 CAPSULE FOUR TIMES DAILY FOR DIARRHEA AS NEEDED    Diarrhea       nystatin 172735 UNIT/GM Powd    MYCOSTATIN    60 g    Apply topically 2 times daily Apply to groin area twice daily.    Yeast infection of the skin       omeprazole 20 MG CR capsule    priLOSEC    28 capsule    TAKE 1 CAPSULE DAILY ON EMPTY STOMACH.    Gastroesophageal reflux disease without esophagitis       psyllium Packet    METAMUCIL/KONSYL     Take 1 packet by mouth daily        Rectal Barrier     90 g    Apply to affected areas as needed daily    Generalized muscle weakness       SB IBUPROFEN 200 MG tablet   Generic drug:  ibuprofen     100 tablet    TAKE 1 TABLET EVERY 4 HOURS AS NEEDED FOR PAIN OR FEVER    Pain

## 2018-07-18 NOTE — PROGRESS NOTES
Visit Date:   2018      SUBJECTIVE:  Staff reports no concerns.      PHYSICAL EXAMINATION:   VITAL SIGNS:  Respirations 20, pulse oximetry is 95.  He is afebrile, pulse 76 and blood pressure 130/74.     GENERAL:  He is sitting in a chair, breathing comfortably, in no distress.   CARDIAC:  Regular.   LUNGS:  Clear.      ASSESSMENT:  Progressive dementia.  Had a recent hospitalization for febrile illness noted at the last visit but he seems to be back at baseline.  Also at that time, he had a pacemaker battery change.  Has a history of sick sinus syndrome and a remote history of cholecystitis.      PLAN:  Chart and meds reviewed.  Continue with present plans.         RIDGE QUINN MD             D: 2018   T: 2018   MT: ELISHA      Name:     ESA LEON   MRN:      [id number]        Account:      [id number]   :      1930           Visit Date:   2018      Document: [id number]       cc: [facility]

## 2018-08-08 NOTE — PROGRESS NOTES
Visit Date:   2018      SUBJECTIVE:  Staff reports no new problems.  Pharmacy is wondering about the need for ongoing Norco.  Looking at the narcotic record book, he has not received any p.r.n. dose, so will discontinue the order.  No other acute changes.      PHYSICAL EXAMINATION:   VITAL SIGNS:  On exam now, respirations 18, saturation 96, temperature 97, pulse 72, blood pressure 118/64.   CARDIAC:  Regular.   LUNGS:  Clear.   GENERAL:  Sitting in a wheelchair eating his breakfast and breathing comfortably.      ASSESSMENT:     1.  History of progressive dementia.    2.  History of coronary artery disease.    3.  Sick sinus syndrome.    4.  Hypertension.      PLAN:  Chart and meds reviewed.  Continue with present plan.  Staff does not feel he needs the Norco and has not been receiving it, so will discontinue the order.         RIDGE QUINN MD             D: 2018   T: 2018   MT: ELISHA      Name:     ESA LEON   MRN:      [id number]        Account:      [id number]   :      1930           Visit Date:   2018      Document: [id number]       cc: [facility]

## 2018-08-08 NOTE — PROGRESS NOTES
Preliminary Device Interrogation Results.  Final physician signed paceart report to be scanned and attached.    Remote pacemaker transmission received and reviewed.  Device transmission sent per MD orders.  Patient has a [employer] Versa DR dual lead pacemaker.  Normal pacemaker function.  2 MS episodes recorded both <30 seconds. 1 VT epsiode for 10 beats lasting 4 seconds @ 180 bpm. Patient's nurse did not have records of any related symptoms on the date of VHR event. Presenting EGM = AsVp @ 86 bpm.  AP = 23.6%.   = 99.9%.  Estimated battery longevity to YON = 11.5 years.  Patient's nurse notified of interrogation results.  Patient's nurse reports that he is feeling good and denies specific complaints.  Plan for patient to send a remote transmission October 18th, 2018 as scheduled.    Remote pacemaker transmission

## 2018-09-05 NOTE — PROGRESS NOTES
Visit Date:   2018      DATE OF SERVICE: 2018.      SUBJECTIVE:  Staff reports no problems.      OBJECTIVE:   VITAL SIGNS:  Vitals stable.  See flow sheet.   CARDIAC:  Regular.   LUNGS:  Clear.   GENERAL:  The patient is sitting in a wheelchair, breathing comfortably, in no distress, does not appear agitated at this time.      ASSESSMENT:  History of progressive dementia, has coronary artery disease, sick sinus syndrome, and hypertension.      PLAN:  Chart and medications reviewed.  Continue with present plans and patient is DNR.         R JERAD QUINN MD             D: 2018   T: 2018   MT: ELISHA      Name:     ESA LEON   MRN:      [id number]        Account:      [id number]   :      1930           Visit Date:   2018      Document: [id number]       cc: [facility]

## 2018-10-03 NOTE — PROGRESS NOTES
Visit Date:   10/03/2018      SUBJECTIVE:  Staff noted an episode of tremors and brief desaturation episode.  The tremors lasted 15 to 30 minutes.  He then subsequently had a couple second episode of just tremors, no loss of consciousness.  He is back at his baseline.  He has no complaints of headache or chest pain or belly pain now.      OBJECTIVE:   VITAL SIGNS:  Respirations:  24, saturations:  92.  He is afebrile.  Blood pressure:  148/90, pulse:  68.   CARDIAC:  Regular.   LUNGS:  Clear.      ASSESSMENT:  History of dementia, coronary artery disease, hypertension, and sick sinus syndrome.  He had an episode of tremors.  It is hard to determine if this was truly a seizure or just tremors.  We will draw a valproic acid level today.  Assessment:  Stable.  Chart and Meds:  Reviewed.  Await lab draw.         RIDGE QUINN MD             D: 10/03/2018   T: 10/03/2018   MT: HARIKA      Name:     ESA LEON   MRN:      [id number]        Account:      [id number]   :      1930           Visit Date:   10/03/2018      Document: [id number]       cc: [facility]

## 2018-10-18 NOTE — MR AVS SNAPSHOT
"              After Visit Summary   10/18/2018    Toi Cowart    MRN: [id number]           Patient Information     Date Of Birth          1930        Visit Information        Provider Department      10/18/2018 6:00 AM  ICD REMOTE [facility]        Today's Diagnoses     Sinoatrial node dysfunction (H)    -  1       Follow-ups after your visit        Who to contact     If you have questions or need follow up information about today's clinic visit or your schedule please contact [facility] directly at [phone].  Normal or non-critical lab and imaging results will be communicated to you by ChorPpayhart, letter or phone within 4 business days after the clinic has received the results. If you do not hear from us within 7 days, please contact the clinic through ChorPpayhart or phone. If you have a critical or abnormal lab result, we will notify you by phone as soon as possible.  Submit refill requests through Spruceling or call your pharmacy and they will forward the refill request to us. Please allow 3 business days for your refill to be completed.          Additional Information About Your Visit        MyChart Information     Sp[employer]celing lets you send messages to your doctor, view your test results, renew your prescriptions, schedule appointments and more. To sign up, go to [email or web address] . Click on \"Log in\" on the left side of the screen, which will take you to the Welcome page. Then click on \"Sign up Now\" on the right side of the page.     You will be asked to enter the access code listed below, as well as some personal information. Please follow the directions to create your username and password.     Your access code is: [email or web address]  Expires: 2018  1:55 PM     Your access code will  in 90 days. If you need help or a new code, please call your [facility] clinic or [phone].        Care EveryWhere ID     This is your Care EveryWhere ID. This could be used by other organizations to " access your [facility] medical records  [id number]         Blood Pressure from Last 3 Encounters:   04/20/18 165/74   04/16/18 134/72   01/27/17 106/54    Weight from Last 3 Encounters:   04/20/18 82.8 kg (182 lb 8.7 oz)   04/16/18 78.5 kg (173 lb)   07/27/16 75.5 kg (166 lb 7.2 oz)              We Performed the Following     (75309)INTERROGATION DEVICE EVAL REMOTE, PACER/ICD        Primary Care Provider Office Phone # Fax #    R Julio Bell [phone]       [address] [place] [address]        Equal Access to Services     KIRAN RÍOS : Hadii aad ku hadasho Soloretta, waaxda luqadaha, qaybta kaalmada adeegyada, estiven dawson . So [facility] [phone].    ATENCIÓN: Si habla español, tiene a lagos disposición servicios gratuitos de asistencia lingüística. Llame al [phone].    We comply with applicable federal civil rights laws and [place] laws. We do not discriminate on the basis of race, color, national origin, age, disability, sex, sexual orientation, or gender identity.            Thank you!     Thank you for choosing [facility]  for your care. Our goal is always to provide you with excellent care. Hearing back from our patients is one way we can continue to improve our services. Please take a few minutes to complete the written survey that you may receive in the mail after your visit with us. Thank you!             Your Updated Medication List - Protect others around you: Learn how to safely use, store and throw away your medicines at www.disposemymeds.org.          This list is accurate as of 10/18/18 11:59 PM.  Always use your most recent med list.                   Brand Name Dispense Instructions for use Diagnosis    amoxicillin-clavulanate 875-125 MG per tablet    AUGMENTIN    10 tablet    Take 1 tablet by mouth every 12 hours    Healthcare-associated pneumonia, Aspiration pneumonia of both lungs, unspecified aspiration pneumonia type,  unspecified part of lung (H)       CALMOSEPTINE 0.44-20.6 % Oint   Generic drug:  Menthol-Zinc Oxide     113 g    USE AS NEEDED.    Skin irritation       * divalproex sodium delayed-release 125 MG DR capsule    DEPAKOTE SPRINKLE    84 capsule    TAKE (1) CAPSULE THREE TIMES DAILY.    Dementia due to medical condition without behavioral disturbance       * divalproex sodium delayed-release 250 MG DR tablet    DEPAKOTE    60 tablet    Take 1 tablet (250 mg) by mouth 2 times daily    Dementia due to medical condition without behavioral disturbance       gentamicin 0.3 % ophthalmic solution    GARAMYCIN     Place 1 drop into both eyes 3 times daily        HYDROcodone-acetaminophen 5-325 MG per tablet    NORCO     Take 1 tablet by mouth 2 times daily as needed for severe pain        loperamide 2 MG capsule    IMODIUM    120 capsule    TAKE 1 CAPSULE FOUR TIMES DAILY FOR DIARRHEA AS NEEDED    Diarrhea       nystatin 939205 UNIT/GM Powd    MYCOSTATIN    60 g    Apply topically 2 times daily Apply to groin area twice daily.    Yeast infection of the skin       omeprazole 20 MG CR capsule    priLOSEC    28 capsule    TAKE 1 CAPSULE DAILY ON EMPTY STOMACH.    Gastroesophageal reflux disease without esophagitis       psyllium Packet    METAMUCIL/KONSYL     Take 1 packet by mouth daily        Rectal Barrier     90 g    Apply to affected areas as needed daily    Generalized muscle weakness       SB IBUPROFEN 200 MG tablet   Generic drug:  ibuprofen     100 tablet    TAKE 1 TABLET EVERY 4 HOURS AS NEEDED FOR PAIN OR FEVER    Pain       * Notice:  This list has 2 medication(s) that are the same as other medications prescribed for you. Read the directions carefully, and ask your doctor or other care provider to review them with you.

## 2018-10-19 NOTE — PROGRESS NOTES
Preliminary Device Interrogation Results.  Final physician signed paceart report to be scanned and attached.    Remote pacemaker transmission received and reviewed.  Device transmission sent per MD orders.  Patient has a Medtronic dual lead pacemaker.  Normal pacemaker function.  One mode switch episode recorded lasting < 1 minute in duration with no stored egm available. No high ventricular rates since last remote transmission on 7/18/18.   Presenting EGM = Atrial sensing with ventricular pacing @ 80 bpm.  AP = 19%.   = 100%.   Estimated battery longevity to YON = 11 years./Battery voltage = 2.79V.  Patient's nurse, Ananya, at the [facility] center was notified of interrogation results. Ananya reports that patient is feeling well.  The [facility] was given the date for the patient's next remote transmission on 1/21/19.      Remote pacemaker transmission

## 2018-11-05 NOTE — TELEPHONE ENCOUNTER
HYDROcodone-acetaminophen (NORCO) 5-325 MG per tablet  Last Written Prescription Date:  Historical medication   Last Fill Quantity: 0,   # refills: 0  Last Office Visit: 10/3/18  Future Office visit:       Routing refill request to provider for review/approval because:  Drug not on the [facility], [facility] or [facility] refill protocol or controlled substance

## 2018-11-06 NOTE — TELEPHONE ENCOUNTER
's pharmacy called about the patient's last refill of hydrocodone.  Dr. KAITY Bell wrote for #10 and the patient is a nursing home patient and he routinely gets 56 tablets that comes in a bubble pack.  Lisa is requesting a new rx for the 56 tablets.

## 2018-11-14 NOTE — PROGRESS NOTES
Visit Date:   2018      SUBJECTIVE:  The only concern staff has is that they would rather move his medication give time for Prilosec from 6:00 a.m. to 8:00 a.m.  They have to wake him up every morning and it would be easier.  That seems appropriate.  No other new issues.      OBJECTIVE:   VITAL SIGNS:  Pulse 68.  He is afebrile, respirations 20, sat 95% on room air, blood pressure 122/72.   CARDIAC:  Regular.   LUNGS:  Clear.   ABDOMEN:  Soft.      ASSESSMENT:   1.  Dementia.    2.  Coronary artery disease.   3.  Hypertension.    4.  Sick sinus syndrome, does have a pacemaker.      PLAN:  Chart and meds reviewed.  We will continue with present plans but make that administration time of the omeprazole change.         RIDGE QUINN MD             D: 2018   T: 2018   MT: PHILLIP      Name:     ESA LEON   MRN:      [id number]        Account:      [id number]   :      1930           Visit Date:   2018      Document: [id number]       cc: [facility]

## 2018-12-04 NOTE — TELEPHONE ENCOUNTER
HYDROCODONE-ACET 5-325MG      Last Written Prescription Date:  11/6/18  Last Fill Quantity: 56,   # refills: 0  Last Office Visit: 11/14/18 [facility]  Future Office visit:

## 2018-12-05 NOTE — PROGRESS NOTES
Visit Date:   2018      SUBJECTIVE:  Staff has noted that he is overall just slowly failing.  He is eating.  No vomiting.  No fevers.  Slumped over, a little more hard to maintain upright posture at times.  We will set up physical therapy.      PHYSICAL EXAMINATION:    VITAL SIGNS:  On exam, pulse 60.  He is afebrile, respirations 18, sat 93%, blood pressure 134/80.   GENERAL:  He is alert, seems to be mentally at his baseline.  He does have dementia.   CARDIAC:  Regular, without S3, S4.   LUNGS:  Clear.   ABDOMEN:  Soft.  No peritoneal signs.      ASSESSMENT:  Progressive dementia.  Overall, his status, he is failing.  He has known coronary artery disease, hypertension, sick sinus syndrome with a history of pacemaker.  In the remote past, he did have cholangitis and somehow made it through that episode.  I do not feel he is having any acute flare-up of that issue.  He is DNR.  Note his pacemaker battery was changed in the last year.        PLAN:  Add physical therapy for strengthening.  Otherwise, chart and meds reviewed.  Continue with current plans.         RIDGE QUINN MD             D: 2018   T: 2018   MT: IAN      Name:     ESA LEON   MRN:      [id number]        Account:      [id number]   :      1930           Visit Date:   2018      Document: [id number]       cc: [facility]

## 2019-01-01 ENCOUNTER — NURSING HOME DICTATION (OUTPATIENT)
Dept: FAMILY MEDICINE | Facility: OTHER | Age: 84
End: 2019-01-01

## 2019-01-01 ENCOUNTER — ANCILLARY PROCEDURE (OUTPATIENT)
Dept: CARDIOLOGY | Facility: CLINIC | Age: 84
End: 2019-01-01
Attending: INTERNAL MEDICINE
Payer: COMMERCIAL

## 2019-01-01 ENCOUNTER — NURSING HOME DICTATION (OUTPATIENT)
Dept: CARDIOLOGY | Facility: CLINIC | Age: 84
End: 2019-01-01

## 2019-01-01 ENCOUNTER — HOSPITAL ENCOUNTER (INPATIENT)
Facility: HOSPITAL | Age: 84
LOS: 2 days | Discharge: NURSING, REHAB OR LONG-TERM CARE | DRG: 177 | End: 2019-05-05
Attending: INTERNAL MEDICINE | Admitting: INTERNAL MEDICINE
Payer: COMMERCIAL

## 2019-01-01 ENCOUNTER — MEDICAL CORRESPONDENCE (OUTPATIENT)
Dept: HEALTH INFORMATION MANAGEMENT | Facility: CLINIC | Age: 84
End: 2019-01-01

## 2019-01-01 ENCOUNTER — APPOINTMENT (OUTPATIENT)
Dept: GENERAL RADIOLOGY | Facility: HOSPITAL | Age: 84
DRG: 177 | End: 2019-01-01
Attending: INTERNAL MEDICINE
Payer: COMMERCIAL

## 2019-01-01 ENCOUNTER — TELEPHONE (OUTPATIENT)
Dept: FAMILY MEDICINE | Facility: OTHER | Age: 84
End: 2019-01-01

## 2019-01-01 ENCOUNTER — DOCUMENTATION ONLY (OUTPATIENT)
Dept: OTHER | Facility: CLINIC | Age: 84
End: 2019-01-01

## 2019-01-01 VITALS
DIASTOLIC BLOOD PRESSURE: 79 MMHG | SYSTOLIC BLOOD PRESSURE: 169 MMHG | RESPIRATION RATE: 22 BRPM | WEIGHT: 152.34 LBS | TEMPERATURE: 98.9 F | HEIGHT: 65 IN | OXYGEN SATURATION: 98 % | BODY MASS INDEX: 25.38 KG/M2

## 2019-01-01 DIAGNOSIS — R52 PAIN: ICD-10-CM

## 2019-01-01 DIAGNOSIS — J69.0 ASPIRATION PNEUMONIA OF RIGHT UPPER LOBE, UNSPECIFIED ASPIRATION PNEUMONIA TYPE (H): Primary | ICD-10-CM

## 2019-01-01 DIAGNOSIS — I49.5 SICK SINUS SYNDROME (H): ICD-10-CM

## 2019-01-01 DIAGNOSIS — J18.9 HCAP (HEALTHCARE-ASSOCIATED PNEUMONIA): ICD-10-CM

## 2019-01-01 DIAGNOSIS — I49.5 BRADY-TACHY SYNDROME (H): ICD-10-CM

## 2019-01-01 DIAGNOSIS — K21.9 GASTROESOPHAGEAL REFLUX DISEASE WITHOUT ESOPHAGITIS: ICD-10-CM

## 2019-01-01 LAB
ALBUMIN SERPL-MCNC: 2.4 G/DL (ref 3.4–5)
ALBUMIN SERPL-MCNC: 3 G/DL (ref 3.4–5)
ALP SERPL-CCNC: 125 U/L (ref 40–150)
ALP SERPL-CCNC: 94 U/L (ref 40–150)
ALT SERPL W P-5'-P-CCNC: 10 U/L (ref 0–70)
ALT SERPL W P-5'-P-CCNC: 15 U/L (ref 0–70)
ANION GAP SERPL CALCULATED.3IONS-SCNC: 6 MMOL/L (ref 3–14)
ANION GAP SERPL CALCULATED.3IONS-SCNC: 8 MMOL/L (ref 3–14)
ANION GAP SERPL CALCULATED.3IONS-SCNC: 8 MMOL/L (ref 3–14)
AST SERPL W P-5'-P-CCNC: 12 U/L (ref 0–45)
AST SERPL W P-5'-P-CCNC: 12 U/L (ref 0–45)
BACTERIA SPEC CULT: NORMAL
BASOPHILS # BLD AUTO: 0 10E9/L (ref 0–0.2)
BASOPHILS NFR BLD AUTO: 0.3 %
BASOPHILS NFR BLD AUTO: 0.4 %
BASOPHILS NFR BLD AUTO: 0.5 %
BILIRUB SERPL-MCNC: 0.4 MG/DL (ref 0.2–1.3)
BILIRUB SERPL-MCNC: 0.4 MG/DL (ref 0.2–1.3)
BUN SERPL-MCNC: 10 MG/DL (ref 7–30)
BUN SERPL-MCNC: 19 MG/DL (ref 7–30)
BUN SERPL-MCNC: 25 MG/DL (ref 7–30)
CALCIUM SERPL-MCNC: 8 MG/DL (ref 8.5–10.1)
CALCIUM SERPL-MCNC: 8.6 MG/DL (ref 8.5–10.1)
CALCIUM SERPL-MCNC: 9 MG/DL (ref 8.5–10.1)
CHLORIDE SERPL-SCNC: 107 MMOL/L (ref 94–109)
CHLORIDE SERPL-SCNC: 108 MMOL/L (ref 94–109)
CHLORIDE SERPL-SCNC: 111 MMOL/L (ref 94–109)
CK SERPL-CCNC: 27 U/L (ref 30–300)
CO2 SERPL-SCNC: 25 MMOL/L (ref 20–32)
CO2 SERPL-SCNC: 26 MMOL/L (ref 20–32)
CO2 SERPL-SCNC: 28 MMOL/L (ref 20–32)
CREAT SERPL-MCNC: 0.58 MG/DL (ref 0.66–1.25)
CREAT SERPL-MCNC: 0.58 MG/DL (ref 0.66–1.25)
CREAT SERPL-MCNC: 0.86 MG/DL (ref 0.66–1.25)
CRP SERPL-MCNC: 13.7 MG/L (ref 0–8)
DIFFERENTIAL METHOD BLD: ABNORMAL
EOSINOPHIL # BLD AUTO: 0.1 10E9/L (ref 0–0.7)
EOSINOPHIL # BLD AUTO: 0.1 10E9/L (ref 0–0.7)
EOSINOPHIL # BLD AUTO: 0.2 10E9/L (ref 0–0.7)
EOSINOPHIL NFR BLD AUTO: 0.6 %
EOSINOPHIL NFR BLD AUTO: 0.7 %
EOSINOPHIL NFR BLD AUTO: 2.5 %
ERYTHROCYTE [DISTWIDTH] IN BLOOD BY AUTOMATED COUNT: 15.5 % (ref 10–15)
ERYTHROCYTE [DISTWIDTH] IN BLOOD BY AUTOMATED COUNT: 15.5 % (ref 10–15)
ERYTHROCYTE [DISTWIDTH] IN BLOOD BY AUTOMATED COUNT: 15.6 % (ref 10–15)
FLUAV+FLUBV RNA SPEC QL NAA+PROBE: NEGATIVE
FLUAV+FLUBV RNA SPEC QL NAA+PROBE: NEGATIVE
GFR SERPL CREATININE-BSD FRML MDRD: 77 ML/MIN/{1.73_M2}
GFR SERPL CREATININE-BSD FRML MDRD: >90 ML/MIN/{1.73_M2}
GFR SERPL CREATININE-BSD FRML MDRD: >90 ML/MIN/{1.73_M2}
GLUCOSE SERPL-MCNC: 104 MG/DL (ref 70–99)
GLUCOSE SERPL-MCNC: 118 MG/DL (ref 70–99)
GLUCOSE SERPL-MCNC: 81 MG/DL (ref 70–99)
HCT VFR BLD AUTO: 35.3 % (ref 40–53)
HCT VFR BLD AUTO: 39.3 % (ref 40–53)
HCT VFR BLD AUTO: 43 % (ref 40–53)
HGB BLD-MCNC: 11.7 G/DL (ref 13.3–17.7)
HGB BLD-MCNC: 13 G/DL (ref 13.3–17.7)
HGB BLD-MCNC: 14.1 G/DL (ref 13.3–17.7)
IMM GRANULOCYTES # BLD: 0 10E9/L (ref 0–0.4)
IMM GRANULOCYTES NFR BLD: 0.2 %
IMM GRANULOCYTES NFR BLD: 0.3 %
IMM GRANULOCYTES NFR BLD: 0.3 %
LACTATE BLD-SCNC: 2 MMOL/L (ref 0.7–2)
LACTATE BLD-SCNC: 2.4 MMOL/L (ref 0.7–2)
LACTATE BLD-SCNC: 4.3 MMOL/L (ref 0.7–2)
LIPASE SERPL-CCNC: 51 U/L (ref 73–393)
LYMPHOCYTES # BLD AUTO: 1.2 10E9/L (ref 0.8–5.3)
LYMPHOCYTES # BLD AUTO: 1.4 10E9/L (ref 0.8–5.3)
LYMPHOCYTES # BLD AUTO: 1.8 10E9/L (ref 0.8–5.3)
LYMPHOCYTES NFR BLD AUTO: 13.2 %
LYMPHOCYTES NFR BLD AUTO: 21.9 %
LYMPHOCYTES NFR BLD AUTO: 23.2 %
MAGNESIUM SERPL-MCNC: 1.6 MG/DL (ref 1.6–2.3)
MCH RBC QN AUTO: 28 PG (ref 26.5–33)
MCH RBC QN AUTO: 28.1 PG (ref 26.5–33)
MCH RBC QN AUTO: 28.3 PG (ref 26.5–33)
MCHC RBC AUTO-ENTMCNC: 32.8 G/DL (ref 31.5–36.5)
MCHC RBC AUTO-ENTMCNC: 33.1 G/DL (ref 31.5–36.5)
MCHC RBC AUTO-ENTMCNC: 33.1 G/DL (ref 31.5–36.5)
MCV RBC AUTO: 84 FL (ref 78–100)
MCV RBC AUTO: 85 FL (ref 78–100)
MCV RBC AUTO: 86 FL (ref 78–100)
MDC_IDC_LEAD_IMPLANT_DT: NORMAL
MDC_IDC_LEAD_LOCATION: NORMAL
MDC_IDC_LEAD_LOCATION_DETAIL_1: NORMAL
MDC_IDC_LEAD_LOCATION_DETAIL_1: NORMAL
MDC_IDC_LEAD_MFG: NORMAL
MDC_IDC_LEAD_MODEL: NORMAL
MDC_IDC_LEAD_POLARITY_TYPE: NORMAL
MDC_IDC_LEAD_SERIAL: NORMAL
MDC_IDC_MSMT_BATTERY_DTM: NORMAL
MDC_IDC_MSMT_BATTERY_DTM: NORMAL
MDC_IDC_MSMT_BATTERY_IMPEDANCE: 127 OHM
MDC_IDC_MSMT_BATTERY_IMPEDANCE: 151 OHM
MDC_IDC_MSMT_BATTERY_REMAINING_LONGEVITY: 124 MO
MDC_IDC_MSMT_BATTERY_REMAINING_LONGEVITY: 129 MO
MDC_IDC_MSMT_BATTERY_STATUS: NORMAL
MDC_IDC_MSMT_BATTERY_STATUS: NORMAL
MDC_IDC_MSMT_BATTERY_VOLTAGE: 2.79 V
MDC_IDC_MSMT_BATTERY_VOLTAGE: 2.79 V
MDC_IDC_MSMT_LEADCHNL_RA_IMPEDANCE_VALUE: 508 OHM
MDC_IDC_MSMT_LEADCHNL_RA_IMPEDANCE_VALUE: 523 OHM
MDC_IDC_MSMT_LEADCHNL_RA_PACING_THRESHOLD_AMPLITUDE: 0.75 V
MDC_IDC_MSMT_LEADCHNL_RA_PACING_THRESHOLD_AMPLITUDE: 0.88 V
MDC_IDC_MSMT_LEADCHNL_RA_PACING_THRESHOLD_PULSEWIDTH: 0.4 MS
MDC_IDC_MSMT_LEADCHNL_RA_PACING_THRESHOLD_PULSEWIDTH: 0.4 MS
MDC_IDC_MSMT_LEADCHNL_RA_SENSING_INTR_AMPL: 2.8 MV
MDC_IDC_MSMT_LEADCHNL_RV_IMPEDANCE_VALUE: 615 OHM
MDC_IDC_MSMT_LEADCHNL_RV_IMPEDANCE_VALUE: 617 OHM
MDC_IDC_MSMT_LEADCHNL_RV_PACING_THRESHOLD_AMPLITUDE: 0.5 V
MDC_IDC_MSMT_LEADCHNL_RV_PACING_THRESHOLD_AMPLITUDE: 0.5 V
MDC_IDC_MSMT_LEADCHNL_RV_PACING_THRESHOLD_PULSEWIDTH: 0.4 MS
MDC_IDC_MSMT_LEADCHNL_RV_PACING_THRESHOLD_PULSEWIDTH: 0.4 MS
MDC_IDC_PG_IMPLANT_DTM: NORMAL
MDC_IDC_PG_IMPLANT_DTM: NORMAL
MDC_IDC_PG_MFG: NORMAL
MDC_IDC_PG_MFG: NORMAL
MDC_IDC_PG_MODEL: NORMAL
MDC_IDC_PG_MODEL: NORMAL
MDC_IDC_PG_SERIAL: NORMAL
MDC_IDC_PG_SERIAL: NORMAL
MDC_IDC_PG_TYPE: NORMAL
MDC_IDC_PG_TYPE: NORMAL
MDC_IDC_SESS_CLINIC_NAME: NORMAL
MDC_IDC_SESS_CLINIC_NAME: NORMAL
MDC_IDC_SESS_DTM: NORMAL
MDC_IDC_SESS_DTM: NORMAL
MDC_IDC_SESS_TYPE: NORMAL
MDC_IDC_SESS_TYPE: NORMAL
MDC_IDC_SET_BRADY_AT_MODE_SWITCH_MODE: NORMAL
MDC_IDC_SET_BRADY_AT_MODE_SWITCH_MODE: NORMAL
MDC_IDC_SET_BRADY_AT_MODE_SWITCH_RATE: 175 {BEATS}/MIN
MDC_IDC_SET_BRADY_AT_MODE_SWITCH_RATE: 175 {BEATS}/MIN
MDC_IDC_SET_BRADY_LOWRATE: 60 {BEATS}/MIN
MDC_IDC_SET_BRADY_LOWRATE: 60 {BEATS}/MIN
MDC_IDC_SET_BRADY_MAX_SENSOR_RATE: 130 {BEATS}/MIN
MDC_IDC_SET_BRADY_MAX_SENSOR_RATE: 130 {BEATS}/MIN
MDC_IDC_SET_BRADY_MAX_TRACKING_RATE: 130 {BEATS}/MIN
MDC_IDC_SET_BRADY_MAX_TRACKING_RATE: 130 {BEATS}/MIN
MDC_IDC_SET_BRADY_MODE: NORMAL
MDC_IDC_SET_BRADY_MODE: NORMAL
MDC_IDC_SET_BRADY_PAV_DELAY_HIGH: 190 MS
MDC_IDC_SET_BRADY_PAV_DELAY_HIGH: 190 MS
MDC_IDC_SET_BRADY_PAV_DELAY_LOW: 230 MS
MDC_IDC_SET_BRADY_PAV_DELAY_LOW: 230 MS
MDC_IDC_SET_BRADY_SAV_DELAY_HIGH: 160 MS
MDC_IDC_SET_BRADY_SAV_DELAY_HIGH: 160 MS
MDC_IDC_SET_BRADY_SAV_DELAY_LOW: 200 MS
MDC_IDC_SET_BRADY_SAV_DELAY_LOW: 200 MS
MDC_IDC_SET_LEADCHNL_RA_PACING_AMPLITUDE: 1.5 V
MDC_IDC_SET_LEADCHNL_RA_PACING_AMPLITUDE: 1.75 V
MDC_IDC_SET_LEADCHNL_RA_PACING_ANODE_ELECTRODE_1: NORMAL
MDC_IDC_SET_LEADCHNL_RA_PACING_ANODE_ELECTRODE_1: NORMAL
MDC_IDC_SET_LEADCHNL_RA_PACING_ANODE_LOCATION_1: NORMAL
MDC_IDC_SET_LEADCHNL_RA_PACING_ANODE_LOCATION_1: NORMAL
MDC_IDC_SET_LEADCHNL_RA_PACING_CAPTURE_MODE: NORMAL
MDC_IDC_SET_LEADCHNL_RA_PACING_CAPTURE_MODE: NORMAL
MDC_IDC_SET_LEADCHNL_RA_PACING_CATHODE_ELECTRODE_1: NORMAL
MDC_IDC_SET_LEADCHNL_RA_PACING_CATHODE_ELECTRODE_1: NORMAL
MDC_IDC_SET_LEADCHNL_RA_PACING_CATHODE_LOCATION_1: NORMAL
MDC_IDC_SET_LEADCHNL_RA_PACING_CATHODE_LOCATION_1: NORMAL
MDC_IDC_SET_LEADCHNL_RA_PACING_POLARITY: NORMAL
MDC_IDC_SET_LEADCHNL_RA_PACING_POLARITY: NORMAL
MDC_IDC_SET_LEADCHNL_RA_PACING_PULSEWIDTH: 0.4 MS
MDC_IDC_SET_LEADCHNL_RA_PACING_PULSEWIDTH: 0.4 MS
MDC_IDC_SET_LEADCHNL_RA_SENSING_ANODE_ELECTRODE_1: NORMAL
MDC_IDC_SET_LEADCHNL_RA_SENSING_ANODE_ELECTRODE_1: NORMAL
MDC_IDC_SET_LEADCHNL_RA_SENSING_ANODE_LOCATION_1: NORMAL
MDC_IDC_SET_LEADCHNL_RA_SENSING_ANODE_LOCATION_1: NORMAL
MDC_IDC_SET_LEADCHNL_RA_SENSING_CATHODE_ELECTRODE_1: NORMAL
MDC_IDC_SET_LEADCHNL_RA_SENSING_CATHODE_ELECTRODE_1: NORMAL
MDC_IDC_SET_LEADCHNL_RA_SENSING_CATHODE_LOCATION_1: NORMAL
MDC_IDC_SET_LEADCHNL_RA_SENSING_CATHODE_LOCATION_1: NORMAL
MDC_IDC_SET_LEADCHNL_RA_SENSING_POLARITY: NORMAL
MDC_IDC_SET_LEADCHNL_RA_SENSING_POLARITY: NORMAL
MDC_IDC_SET_LEADCHNL_RA_SENSING_SENSITIVITY: 0.5 MV
MDC_IDC_SET_LEADCHNL_RA_SENSING_SENSITIVITY: 0.5 MV
MDC_IDC_SET_LEADCHNL_RV_PACING_AMPLITUDE: 2 V
MDC_IDC_SET_LEADCHNL_RV_PACING_AMPLITUDE: 2 V
MDC_IDC_SET_LEADCHNL_RV_PACING_ANODE_ELECTRODE_1: NORMAL
MDC_IDC_SET_LEADCHNL_RV_PACING_ANODE_ELECTRODE_1: NORMAL
MDC_IDC_SET_LEADCHNL_RV_PACING_ANODE_LOCATION_1: NORMAL
MDC_IDC_SET_LEADCHNL_RV_PACING_ANODE_LOCATION_1: NORMAL
MDC_IDC_SET_LEADCHNL_RV_PACING_CAPTURE_MODE: NORMAL
MDC_IDC_SET_LEADCHNL_RV_PACING_CAPTURE_MODE: NORMAL
MDC_IDC_SET_LEADCHNL_RV_PACING_CATHODE_ELECTRODE_1: NORMAL
MDC_IDC_SET_LEADCHNL_RV_PACING_CATHODE_ELECTRODE_1: NORMAL
MDC_IDC_SET_LEADCHNL_RV_PACING_CATHODE_LOCATION_1: NORMAL
MDC_IDC_SET_LEADCHNL_RV_PACING_CATHODE_LOCATION_1: NORMAL
MDC_IDC_SET_LEADCHNL_RV_PACING_POLARITY: NORMAL
MDC_IDC_SET_LEADCHNL_RV_PACING_POLARITY: NORMAL
MDC_IDC_SET_LEADCHNL_RV_PACING_PULSEWIDTH: 0.4 MS
MDC_IDC_SET_LEADCHNL_RV_PACING_PULSEWIDTH: 0.4 MS
MDC_IDC_SET_LEADCHNL_RV_SENSING_ANODE_ELECTRODE_1: NORMAL
MDC_IDC_SET_LEADCHNL_RV_SENSING_ANODE_ELECTRODE_1: NORMAL
MDC_IDC_SET_LEADCHNL_RV_SENSING_ANODE_LOCATION_1: NORMAL
MDC_IDC_SET_LEADCHNL_RV_SENSING_ANODE_LOCATION_1: NORMAL
MDC_IDC_SET_LEADCHNL_RV_SENSING_CATHODE_ELECTRODE_1: NORMAL
MDC_IDC_SET_LEADCHNL_RV_SENSING_CATHODE_ELECTRODE_1: NORMAL
MDC_IDC_SET_LEADCHNL_RV_SENSING_CATHODE_LOCATION_1: NORMAL
MDC_IDC_SET_LEADCHNL_RV_SENSING_CATHODE_LOCATION_1: NORMAL
MDC_IDC_SET_LEADCHNL_RV_SENSING_POLARITY: NORMAL
MDC_IDC_SET_LEADCHNL_RV_SENSING_POLARITY: NORMAL
MDC_IDC_SET_LEADCHNL_RV_SENSING_SENSITIVITY: 2.8 MV
MDC_IDC_SET_LEADCHNL_RV_SENSING_SENSITIVITY: 2.8 MV
MDC_IDC_SET_ZONE_DETECTION_INTERVAL: 333.33 MS
MDC_IDC_SET_ZONE_DETECTION_INTERVAL: 333.33 MS
MDC_IDC_SET_ZONE_DETECTION_INTERVAL: 342.86 MS
MDC_IDC_SET_ZONE_DETECTION_INTERVAL: 342.86 MS
MDC_IDC_SET_ZONE_TYPE: NORMAL
MDC_IDC_STAT_AT_BURDEN_PERCENT: 0 %
MDC_IDC_STAT_AT_BURDEN_PERCENT: 0.1 %
MDC_IDC_STAT_AT_DTM_END: NORMAL
MDC_IDC_STAT_AT_DTM_END: NORMAL
MDC_IDC_STAT_AT_DTM_START: NORMAL
MDC_IDC_STAT_AT_DTM_START: NORMAL
MDC_IDC_STAT_AT_MODE_SW_COUNT: 3
MDC_IDC_STAT_AT_MODE_SW_COUNT: 3
MDC_IDC_STAT_BRADY_AP_VP_PERCENT: 16 %
MDC_IDC_STAT_BRADY_AP_VP_PERCENT: 18 %
MDC_IDC_STAT_BRADY_AP_VS_PERCENT: 0 %
MDC_IDC_STAT_BRADY_AP_VS_PERCENT: 0 %
MDC_IDC_STAT_BRADY_AS_VP_PERCENT: 82 %
MDC_IDC_STAT_BRADY_AS_VP_PERCENT: 84 %
MDC_IDC_STAT_BRADY_AS_VS_PERCENT: 0 %
MDC_IDC_STAT_BRADY_AS_VS_PERCENT: 0 %
MDC_IDC_STAT_BRADY_DTM_END: NORMAL
MDC_IDC_STAT_BRADY_DTM_END: NORMAL
MDC_IDC_STAT_BRADY_DTM_START: NORMAL
MDC_IDC_STAT_BRADY_DTM_START: NORMAL
MDC_IDC_STAT_EPISODE_RECENT_COUNT: 0
MDC_IDC_STAT_EPISODE_RECENT_COUNT: 2
MDC_IDC_STAT_EPISODE_RECENT_COUNT: 3
MDC_IDC_STAT_EPISODE_RECENT_COUNT: 3
MDC_IDC_STAT_EPISODE_RECENT_COUNT_DTM_END: NORMAL
MDC_IDC_STAT_EPISODE_RECENT_COUNT_DTM_START: NORMAL
MDC_IDC_STAT_EPISODE_TYPE: NORMAL
MONOCYTES # BLD AUTO: 0.6 10E9/L (ref 0–1.3)
MONOCYTES # BLD AUTO: 0.7 10E9/L (ref 0–1.3)
MONOCYTES # BLD AUTO: 0.8 10E9/L (ref 0–1.3)
MONOCYTES NFR BLD AUTO: 11.3 %
MONOCYTES NFR BLD AUTO: 6.6 %
MONOCYTES NFR BLD AUTO: 9.4 %
NEUTROPHILS # BLD AUTO: 3.8 10E9/L (ref 1.6–8.3)
NEUTROPHILS # BLD AUTO: 5.4 10E9/L (ref 1.6–8.3)
NEUTROPHILS # BLD AUTO: 6.9 10E9/L (ref 1.6–8.3)
NEUTROPHILS NFR BLD AUTO: 62.3 %
NEUTROPHILS NFR BLD AUTO: 67.4 %
NEUTROPHILS NFR BLD AUTO: 78.9 %
NRBC # BLD AUTO: 0 10*3/UL
NRBC BLD AUTO-RTO: 0 /100
PLATELET # BLD AUTO: 148 10E9/L (ref 150–450)
PLATELET # BLD AUTO: 149 10E9/L (ref 150–450)
PLATELET # BLD AUTO: 189 10E9/L (ref 150–450)
POTASSIUM SERPL-SCNC: 3.6 MMOL/L (ref 3.4–5.3)
POTASSIUM SERPL-SCNC: 3.7 MMOL/L (ref 3.4–5.3)
POTASSIUM SERPL-SCNC: 4.1 MMOL/L (ref 3.4–5.3)
PROCALCITONIN SERPL-MCNC: 0.06 NG/ML
PROT SERPL-MCNC: 5.6 G/DL (ref 6.8–8.8)
PROT SERPL-MCNC: 6.9 G/DL (ref 6.8–8.8)
RBC # BLD AUTO: 4.18 10E12/L (ref 4.4–5.9)
RBC # BLD AUTO: 4.6 10E12/L (ref 4.4–5.9)
RBC # BLD AUTO: 5.02 10E12/L (ref 4.4–5.9)
RSV RNA SPEC NAA+PROBE: NEGATIVE
SODIUM SERPL-SCNC: 141 MMOL/L (ref 133–144)
SODIUM SERPL-SCNC: 142 MMOL/L (ref 133–144)
SODIUM SERPL-SCNC: 144 MMOL/L (ref 133–144)
SPECIMEN SOURCE: NORMAL
WBC # BLD AUTO: 6.1 10E9/L (ref 4–11)
WBC # BLD AUTO: 8 10E9/L (ref 4–11)
WBC # BLD AUTO: 8.7 10E9/L (ref 4–11)

## 2019-01-01 PROCEDURE — 25000132 ZZH RX MED GY IP 250 OP 250 PS 637: Performed by: INTERNAL MEDICINE

## 2019-01-01 PROCEDURE — 25000125 ZZHC RX 250

## 2019-01-01 PROCEDURE — 80053 COMPREHEN METABOLIC PANEL: CPT | Performed by: INTERNAL MEDICINE

## 2019-01-01 PROCEDURE — 25000128 H RX IP 250 OP 636: Performed by: INTERNAL MEDICINE

## 2019-01-01 PROCEDURE — 84145 PROCALCITONIN (PCT): CPT | Performed by: INTERNAL MEDICINE

## 2019-01-01 PROCEDURE — 85025 COMPLETE CBC W/AUTO DIFF WBC: CPT | Performed by: INTERNAL MEDICINE

## 2019-01-01 PROCEDURE — 96367 TX/PROPH/DG ADDL SEQ IV INF: CPT

## 2019-01-01 PROCEDURE — 12000000 ZZH R&B MED SURG/OB

## 2019-01-01 PROCEDURE — 40000275 ZZH STATISTIC RCP TIME EA 10 MIN

## 2019-01-01 PROCEDURE — 40000786 ZZHCL STATISTIC ACTIVE MRSA SURVEILLANCE CULTURE: Performed by: INTERNAL MEDICINE

## 2019-01-01 PROCEDURE — 93294 REM INTERROG EVL PM/LDLS PM: CPT | Performed by: INTERNAL MEDICINE

## 2019-01-01 PROCEDURE — 71045 X-RAY EXAM CHEST 1 VIEW: CPT | Mod: TC

## 2019-01-01 PROCEDURE — 93296 REM INTERROG EVL PM/IDS: CPT | Mod: ZF

## 2019-01-01 PROCEDURE — 87040 BLOOD CULTURE FOR BACTERIA: CPT | Performed by: INTERNAL MEDICINE

## 2019-01-01 PROCEDURE — 36415 COLL VENOUS BLD VENIPUNCTURE: CPT | Performed by: INTERNAL MEDICINE

## 2019-01-01 PROCEDURE — 83605 ASSAY OF LACTIC ACID: CPT | Performed by: INTERNAL MEDICINE

## 2019-01-01 PROCEDURE — 96365 THER/PROPH/DIAG IV INF INIT: CPT

## 2019-01-01 PROCEDURE — 83690 ASSAY OF LIPASE: CPT | Performed by: INTERNAL MEDICINE

## 2019-01-01 PROCEDURE — 99223 1ST HOSP IP/OBS HIGH 75: CPT | Performed by: INTERNAL MEDICINE

## 2019-01-01 PROCEDURE — 83735 ASSAY OF MAGNESIUM: CPT | Performed by: INTERNAL MEDICINE

## 2019-01-01 PROCEDURE — 80048 BASIC METABOLIC PNL TOTAL CA: CPT | Performed by: INTERNAL MEDICINE

## 2019-01-01 PROCEDURE — 99285 EMERGENCY DEPT VISIT HI MDM: CPT | Mod: Z6 | Performed by: INTERNAL MEDICINE

## 2019-01-01 PROCEDURE — 25800030 ZZH RX IP 258 OP 636: Performed by: INTERNAL MEDICINE

## 2019-01-01 PROCEDURE — 25000125 ZZHC RX 250: Performed by: INTERNAL MEDICINE

## 2019-01-01 PROCEDURE — 99239 HOSP IP/OBS DSCHRG MGMT >30: CPT | Performed by: INTERNAL MEDICINE

## 2019-01-01 PROCEDURE — 82550 ASSAY OF CK (CPK): CPT | Performed by: INTERNAL MEDICINE

## 2019-01-01 PROCEDURE — 25000128 H RX IP 250 OP 636

## 2019-01-01 PROCEDURE — 99285 EMERGENCY DEPT VISIT HI MDM: CPT | Mod: 25

## 2019-01-01 PROCEDURE — 87631 RESP VIRUS 3-5 TARGETS: CPT | Performed by: INTERNAL MEDICINE

## 2019-01-01 PROCEDURE — 86140 C-REACTIVE PROTEIN: CPT | Performed by: INTERNAL MEDICINE

## 2019-01-01 RX ORDER — BISACODYL 10 MG
10 SUPPOSITORY, RECTAL RECTAL DAILY PRN
Status: DISCONTINUED | OUTPATIENT
Start: 2019-01-01 | End: 2019-01-01 | Stop reason: HOSPADM

## 2019-01-01 RX ORDER — ONDANSETRON 4 MG/1
4 TABLET, ORALLY DISINTEGRATING ORAL EVERY 6 HOURS PRN
Status: DISCONTINUED | OUTPATIENT
Start: 2019-01-01 | End: 2019-01-01 | Stop reason: HOSPADM

## 2019-01-01 RX ORDER — ONDANSETRON 2 MG/ML
4 INJECTION INTRAMUSCULAR; INTRAVENOUS EVERY 6 HOURS PRN
Status: DISCONTINUED | OUTPATIENT
Start: 2019-01-01 | End: 2019-01-01 | Stop reason: HOSPADM

## 2019-01-01 RX ORDER — SODIUM CHLORIDE 9 MG/ML
INJECTION, SOLUTION INTRAVENOUS
Status: DISPENSED
Start: 2019-01-01 | End: 2019-01-01

## 2019-01-01 RX ORDER — ACETAMINOPHEN 650 MG/1
650 SUPPOSITORY RECTAL EVERY 4 HOURS PRN
Status: DISCONTINUED | OUTPATIENT
Start: 2019-01-01 | End: 2019-01-01 | Stop reason: HOSPADM

## 2019-01-01 RX ORDER — ALBUTEROL SULFATE 0.83 MG/ML
2.5 SOLUTION RESPIRATORY (INHALATION) EVERY 4 HOURS PRN
DISCHARGE
Start: 2019-01-01 | End: 2019-01-01

## 2019-01-01 RX ORDER — DIVALPROEX SODIUM 250 MG/1
250 TABLET, DELAYED RELEASE ORAL 2 TIMES DAILY
Status: DISCONTINUED | OUTPATIENT
Start: 2019-01-01 | End: 2019-01-01 | Stop reason: HOSPADM

## 2019-01-01 RX ORDER — HYDROCODONE BITARTRATE AND ACETAMINOPHEN 5; 325 MG/1; MG/1
TABLET ORAL
Qty: 56 TABLET | Refills: 0 | Status: SHIPPED | OUTPATIENT
Start: 2019-01-01

## 2019-01-01 RX ORDER — HYDROCODONE BITARTRATE AND ACETAMINOPHEN 5; 325 MG/1; MG/1
TABLET ORAL
Qty: 56 TABLET | Refills: 0 | Status: SHIPPED | OUTPATIENT
Start: 2019-01-01 | End: 2019-01-01

## 2019-01-01 RX ORDER — MAGNESIUM SULFATE HEPTAHYDRATE 40 MG/ML
4 INJECTION, SOLUTION INTRAVENOUS EVERY 4 HOURS PRN
Status: DISCONTINUED | OUTPATIENT
Start: 2019-01-01 | End: 2019-01-01 | Stop reason: HOSPADM

## 2019-01-01 RX ORDER — HYDROCODONE BITARTRATE AND ACETAMINOPHEN 5; 325 MG/1; MG/1
1-2 TABLET ORAL EVERY 6 HOURS PRN
Status: DISCONTINUED | OUTPATIENT
Start: 2019-01-01 | End: 2019-01-01 | Stop reason: HOSPADM

## 2019-01-01 RX ORDER — POTASSIUM CL/LIDO/0.9 % NACL 10MEQ/0.1L
10 INTRAVENOUS SOLUTION, PIGGYBACK (ML) INTRAVENOUS
Status: DISCONTINUED | OUTPATIENT
Start: 2019-01-01 | End: 2019-01-01 | Stop reason: HOSPADM

## 2019-01-01 RX ORDER — POTASSIUM CHLORIDE 1.5 G/1.58G
20-40 POWDER, FOR SOLUTION ORAL
Status: DISCONTINUED | OUTPATIENT
Start: 2019-01-01 | End: 2019-01-01 | Stop reason: HOSPADM

## 2019-01-01 RX ORDER — DOXYCYCLINE 100 MG/10ML
INJECTION, POWDER, LYOPHILIZED, FOR SOLUTION INTRAVENOUS
Status: COMPLETED
Start: 2019-01-01 | End: 2019-01-01

## 2019-01-01 RX ORDER — POTASSIUM CHLORIDE 7.45 MG/ML
10 INJECTION INTRAVENOUS
Status: DISCONTINUED | OUTPATIENT
Start: 2019-01-01 | End: 2019-01-01 | Stop reason: HOSPADM

## 2019-01-01 RX ORDER — CEFTRIAXONE SODIUM 1 G/50ML
1 INJECTION, SOLUTION INTRAVENOUS ONCE
Status: COMPLETED | OUTPATIENT
Start: 2019-01-01 | End: 2019-01-01

## 2019-01-01 RX ORDER — SODIUM CHLORIDE, SODIUM LACTATE, POTASSIUM CHLORIDE, CALCIUM CHLORIDE 600; 310; 30; 20 MG/100ML; MG/100ML; MG/100ML; MG/100ML
INJECTION, SOLUTION INTRAVENOUS CONTINUOUS
Status: ACTIVE | OUTPATIENT
Start: 2019-01-01 | End: 2019-01-01

## 2019-01-01 RX ORDER — NALOXONE HYDROCHLORIDE 0.4 MG/ML
.1-.4 INJECTION, SOLUTION INTRAMUSCULAR; INTRAVENOUS; SUBCUTANEOUS
Status: DISCONTINUED | OUTPATIENT
Start: 2019-01-01 | End: 2019-01-01 | Stop reason: HOSPADM

## 2019-01-01 RX ORDER — ALBUTEROL SULFATE 0.83 MG/ML
3 SOLUTION RESPIRATORY (INHALATION) EVERY 4 HOURS PRN
Status: DISCONTINUED | OUTPATIENT
Start: 2019-01-01 | End: 2019-01-01 | Stop reason: HOSPADM

## 2019-01-01 RX ORDER — AMPICILLIN AND SULBACTAM 1; .5 G/1; G/1
INJECTION, POWDER, FOR SOLUTION INTRAMUSCULAR; INTRAVENOUS
Status: COMPLETED
Start: 2019-01-01 | End: 2019-01-01

## 2019-01-01 RX ORDER — HYDROCODONE BITARTRATE AND ACETAMINOPHEN 5; 325 MG/1; MG/1
TABLET ORAL
Qty: 56 TABLET | Refills: 0 | OUTPATIENT
Start: 2019-01-01

## 2019-01-01 RX ORDER — POTASSIUM CHLORIDE 1500 MG/1
20-40 TABLET, EXTENDED RELEASE ORAL
Status: DISCONTINUED | OUTPATIENT
Start: 2019-01-01 | End: 2019-01-01 | Stop reason: HOSPADM

## 2019-01-01 RX ADMIN — DIVALPROEX SODIUM 250 MG: 250 TABLET, DELAYED RELEASE ORAL at 09:03

## 2019-01-01 RX ADMIN — OMEPRAZOLE 20 MG: 20 CAPSULE, DELAYED RELEASE ORAL at 06:22

## 2019-01-01 RX ADMIN — AMPICILLIN SODIUM AND SULBACTAM SODIUM 3 G: 2; 1 INJECTION, POWDER, FOR SOLUTION INTRAMUSCULAR; INTRAVENOUS at 14:47

## 2019-01-01 RX ADMIN — PSYLLIUM HUSK 1 PACKET: 3.4 POWDER ORAL at 09:02

## 2019-01-01 RX ADMIN — AMPICILLIN SODIUM AND SULBACTAM SODIUM 3 G: 2; 1 INJECTION, POWDER, FOR SOLUTION INTRAMUSCULAR; INTRAVENOUS at 03:34

## 2019-01-01 RX ADMIN — AZITHROMYCIN MONOHYDRATE 500 MG: 500 INJECTION, POWDER, LYOPHILIZED, FOR SOLUTION INTRAVENOUS at 22:32

## 2019-01-01 RX ADMIN — MICONAZOLE NITRATE: 2 POWDER TOPICAL at 20:58

## 2019-01-01 RX ADMIN — SODIUM CHLORIDE 1000 ML: 9 INJECTION, SOLUTION INTRAVENOUS at 21:53

## 2019-01-01 RX ADMIN — AMPICILLIN SODIUM AND SULBACTAM SODIUM 3 G: 2; 1 INJECTION, POWDER, FOR SOLUTION INTRAMUSCULAR; INTRAVENOUS at 20:58

## 2019-01-01 RX ADMIN — SODIUM CHLORIDE, POTASSIUM CHLORIDE, SODIUM LACTATE AND CALCIUM CHLORIDE: 600; 310; 30; 20 INJECTION, SOLUTION INTRAVENOUS at 04:21

## 2019-01-01 RX ADMIN — MICONAZOLE NITRATE: 2 POWDER TOPICAL at 09:04

## 2019-01-01 RX ADMIN — OMEPRAZOLE 20 MG: 20 CAPSULE, DELAYED RELEASE ORAL at 09:02

## 2019-01-01 RX ADMIN — DOXYCYCLINE 100 MG: 100 INJECTION, POWDER, LYOPHILIZED, FOR SOLUTION INTRAVENOUS at 02:55

## 2019-01-01 RX ADMIN — AMPICILLIN SODIUM AND SULBACTAM SODIUM 3 G: 2; 1 INJECTION, POWDER, FOR SOLUTION INTRAMUSCULAR; INTRAVENOUS at 01:12

## 2019-01-01 RX ADMIN — DOXYCYCLINE 100 MG: 100 INJECTION, POWDER, LYOPHILIZED, FOR SOLUTION INTRAVENOUS at 02:56

## 2019-01-01 RX ADMIN — CEFTRIAXONE SODIUM 1 G: 1 INJECTION, SOLUTION INTRAVENOUS at 22:01

## 2019-01-01 RX ADMIN — AMPICILLIN SODIUM AND SULBACTAM SODIUM 3 G: 1; .5 INJECTION, POWDER, FOR SOLUTION INTRAMUSCULAR; INTRAVENOUS at 01:12

## 2019-01-01 RX ADMIN — ENOXAPARIN SODIUM 40 MG: 40 INJECTION SUBCUTANEOUS at 02:13

## 2019-01-01 RX ADMIN — DIVALPROEX SODIUM 250 MG: 250 TABLET, DELAYED RELEASE ORAL at 08:20

## 2019-01-01 RX ADMIN — ENOXAPARIN SODIUM 40 MG: 40 INJECTION SUBCUTANEOUS at 02:44

## 2019-01-01 RX ADMIN — AMPICILLIN SODIUM AND SULBACTAM SODIUM 3 G: 2; 1 INJECTION, POWDER, FOR SOLUTION INTRAMUSCULAR; INTRAVENOUS at 09:00

## 2019-01-01 RX ADMIN — PSYLLIUM HUSK 1 PACKET: 3.4 POWDER ORAL at 08:20

## 2019-01-01 RX ADMIN — MICONAZOLE NITRATE: 2 POWDER TOPICAL at 08:22

## 2019-01-01 RX ADMIN — AMOXICILLIN AND CLAVULANATE POTASSIUM 1 TABLET: 875; 125 TABLET, FILM COATED ORAL at 08:20

## 2019-01-01 ASSESSMENT — ACTIVITIES OF DAILY LIVING (ADL)
ADLS_ACUITY_SCORE: 36
ADLS_ACUITY_SCORE: 36
ADLS_ACUITY_SCORE: 34
ADLS_ACUITY_SCORE: 34
ADLS_ACUITY_SCORE: 32
ADLS_ACUITY_SCORE: 34
SWALLOWING: 0-->SWALLOWS FOODS/LIQUIDS WITHOUT DIFFICULTY
ADLS_ACUITY_SCORE: 34
ADLS_ACUITY_SCORE: 32
ADLS_ACUITY_SCORE: 36

## 2019-01-01 ASSESSMENT — MIFFLIN-ST. JEOR: SCORE: 1287.88

## 2019-01-01 ASSESSMENT — ENCOUNTER SYMPTOMS
COUGH: 1
FEVER: 1

## 2019-01-02 NOTE — TELEPHONE ENCOUNTER
Hydrocodone      Last Written Prescription Date:  12/6/18  Last Fill Quantity: 56,   # refills: 0  Last Office Visit: 1/2/19 nursing home visit  Future Office visit:       Routing refill request to provider for review/approval because:

## 2019-01-02 NOTE — TELEPHONE ENCOUNTER
Norco       Last Written Prescription Date:  12/6/2018  Last Fill Quantity: 56,   # refills: 0  Last Office Visit: 12/5/2018  Future Office visit:       Routing refill request to provider for review/approval because:  Drug not on the [facility], [facility] or [facility] refill protocol or controlled substance

## 2019-01-02 NOTE — PROGRESS NOTES
Visit Date:   2019      Staff reports no concerns.      PHYSICAL EXAMINATION:   VITAL SIGNS:  Blood pressure 128/70, sat 94, temperature 98, weight 163, pulse 78.   GENERAL:  He is lying in bed, breathing comfortably, in no distress.   CARDIAC:  Regular.   LUNGS:  Clear.   ABDOMEN:  Soft, nontender.  No mass.  No guarding.      ASSESSMENT:     1.  Progressive dementia.   2.  Coronary artery disease.   3.  Hypertension.   4.  Sick sinus syndrome with a history of pacemaker placement.   5.  In the distant past, had cholangitis.     6.  DO NOT RESUSCITATE status.       PLAN:  Chart and medications reviewed.  We will continue with current plans.         RIDGE QUINN MD             D: 2019   T: 2019   MT: ARY      Name:     ESA LEON   MRN:      [id number]        Account:      [id number]   :      1930           Visit Date:   2019      Document: [id number]       cc: [facility]

## 2019-01-08 NOTE — TELEPHONE ENCOUNTER
Sharmin was signed 1.4.19 but Guardiedward Thompson has been dosing BID scheduled dose instead of PRN.  Pharmacy faxed and is requesting directions to be scheduled twice daily.      Please advise if I can notify pharmacy that it is okay to dispense Rx as BID scheduled instead of BID PRN.

## 2019-01-23 NOTE — TELEPHONE ENCOUNTER
Controlled Substance Refill Request for hydrocodone  Problem List Complete:  No     PROVIDER TO CONSIDER COMPLETION OF PROBLEM LIST AND OVERVIEW/CONTROLLED SUBSTANCE AGREEMENT    Last Written Prescription Date:  1/4/19  Last Fill Quantity: 56,   # refills: 0    Last Office Visit with [facility] primary care provider: 1/2/19    Future Office visit:     Controlled substance agreement: [unfilled]    Last Urine Drug Screen: No results found for: CDAUT, No results found for: COMDAT, No results found for: THC13, PCP13, COC13, MAMP13, OPI13, AMP13, BZO13, TCA13, MTD13, BAR13, OXY13, PPX13, BUP13     Processing:  Staff will hand deliver Rx to on-site pharmacy     [email or web address]       checked in past 3 months?

## 2019-01-28 NOTE — TELEPHONE ENCOUNTER
Norco  Last Written Prescription Date: 1/24/19  Last Fill Quantity: 56 # of Refills: 0  Last Office Visit: 1/2/19    Per guardian, pt is on scheduled dose.  Would like sig changed to bid, no prn.

## 2019-02-09 NOTE — TELEPHONE ENCOUNTER
Walked RX to [facility].     
norco      Last Written Prescription Date: 3/28/17  Last Fill Quantity: 30,  # refills: 0   Last Office Visit with G, P or [facility] prescribing provider: 4/5/17                                               
(4) no impairment

## 2019-02-14 NOTE — PROGRESS NOTES
Visit Date:   2019      SUBJECTIVE:  Staff reports no concerns.  The patient offers no concerns.      VITAL SIGNS:  Blood pressure is 126/80, his pulse is 62, temperature is 97.  Respirations 16.   CARDIAC:  Regular.   LUNGS:  Clear.   ABDOMEN:  Soft, no masses, no peritoneal signs.      ASSESSMENT AND PLAN:  Progressive dementia with noncoronary artery disease, hypertension and sick sinus syndrome with history of pacemaker placement.  Also, in the past has had cholangitis.  He is a DNR status.  No new issues.  Chart medication reviewed.  We will continue with present plans.         RIDGE QUINN MD             D: 2019   T: 2019   MT: AS      Name:     ESA LEON   MRN:      [id number]        Account:      [id number]   :      1930           Visit Date:   2019      Document: [id number]       cc: [facility]

## 2019-02-27 NOTE — TELEPHONE ENCOUNTER
srinico      Last Written Prescription Date:  1/28/19  Last Fill Quantity: 56,   # refills: 0  Last Office Visit: 2/13/19  Future Office visit:       Routing refill request to provider for review/approval because:  Drug not on the [facility], P or [facility] refill protocol or controlled substance

## 2019-03-06 NOTE — PROGRESS NOTES
Visit Date:   2019      SUBJECTIVE:  Staff reports no concerns.  The patient is lying in the bed, has no complaints.      OBJECTIVE:   VITALS:  Reviewed.   CARDIAC:  Regular.   LUNGS:  Clear.   ABDOMEN:  Soft.  No peritoneal signs.      ASSESSMENT:   1.  Dementia.   2.  Coronary artery disease.   3.  Hypertension.   4.  Sick sinus syndrome.   5.  History of pacemaker.   6.  Remote history of cholangitis.     7.  DNR.        PLAN:  Chart meds reviewed.  We will continue with present plans.  Overall, his condition is slowly deteriorating, but no acute changes.         RIDGE QUINN MD             D: 2019   T: 2019   MT: IAN      Name:     ESA LEON   MRN:      [id number]        Account:      [id number]   :      1930           Visit Date:   2019      Document: [id number]       cc: [facility]

## 2019-03-28 NOTE — TELEPHONE ENCOUNTER
Andrewco       Last Written Prescription Date:  2/27/2019  Last Fill Quantity: 56,   # refills: 0  Last Office Visit: 3/6/2019  Future Office visit:       Routing refill request to provider for review/approval because:  Drug not on the [facility], [facility] or [facility] refill protocol or controlled substance

## 2019-04-03 NOTE — PROGRESS NOTES
Visit Date:   2019      SUBJECTIVE:  Staff reports no issues.  The patient has no concerns.      OBJECTIVE   EXTREMITIES:  Note, recent weight is 156.   GENERAL:  He is lying in bed, breathing comfortably, in no distress.   LUNGS:  Clear.   CARDIAC:  Regular.   ABDOMEN:  Soft, no peritoneal signs.      ASSESSMENT:  Coronary artery disease, hypertension, sick sinus syndrome, history of pacemaker placement, remote history of cholangitis.  He has end-stage dementia and DNR status.      PLAN:  Chart and meds reviewed.  Continue with present plans.         RIDGE QUINN MD             D: 2019   T: 2019   MT: MIYA      Name:     ESA LEON   MRN:      [id number]        Account:      [id number]   :      1930           Visit Date:   2019      Document: [id number]       cc: [facility]

## 2019-05-01 NOTE — PROGRESS NOTES
Visit Date:   2019      SUBJECTIVE:  Staff reports no problems.  The patient is sitting, having his breakfast and offers no complaints.      PHYSICAL EXAMINATION:   VITAL SIGNS:  His blood pressure is 122/64, sat is 96, respirations 16.  He is afebrile.   CARDIAC:  Regular.   LUNGS:  Clear.      ASSESSMENT:  Coronary artery disease, hypertension, sick sinus syndrome, history of pacemaker placement and distant history of cholangitis.  He has end-stage dementia and is DNR.      PLAN:  Chart and meds reviewed.  Continue with present plans.         RIDGE QUINN MD             D: 2019   T: 2019   MT: EVELYN      Name:     SEA LEON   MRN:      [id number]        Account:      [id number]   :      1930           Visit Date:   2019      Document: [id number]       cc[facility]

## 2019-05-03 PROBLEM — G93.41 METABOLIC ENCEPHALOPATHY: Status: ACTIVE | Noted: 2019-01-01

## 2019-05-03 PROBLEM — R65.20 SEVERE SEPSIS (H): Status: ACTIVE | Noted: 2019-01-01

## 2019-05-03 PROBLEM — A41.9 SEVERE SEPSIS (H): Status: ACTIVE | Noted: 2019-01-01

## 2019-05-04 PROBLEM — R65.20: Status: ACTIVE | Noted: 2019-01-01

## 2019-05-04 PROBLEM — A40.3: Status: ACTIVE | Noted: 2019-01-01

## 2019-05-04 NOTE — PROVIDER NOTIFICATION
Notified provider that UA ordered unable to get due to incontinence. No straight cath at this time.

## 2019-05-04 NOTE — PLAN OF CARE
VSS on RA sat above 93% t/o shift. LS w/crackles in bases, infrequent fair cough, no productive sputum. BS normoactive. Incontinent of urine. Ambulates Assist x2 w/GB pivot to chair. Slept much of shift. IV bloody but infusing w/o difficulty. No difficulty swallowing pills noted. Needs cues to feed or initiate drinking. Call light w/in reach, does not use appropriately.      Face to face report given with opportunity to observe patient.    Report given to Chayito SABILLON RN.     Graeme Knight   5/4/2019  3:31 PM

## 2019-05-04 NOTE — PROGRESS NOTES
[facility]    Hospitalist Progress Note    Date of Service (when I saw the patient): 05/04/2019    Assessment & Plan   Toi Cowart is a 88 year old  man who was admitted on 5/3/2019.  He is a resident of [facility] and carries a history of dementia, CAD, sinus node dysfunction with PPM implantation, as well as a remote history of rectal cancer, cholecystitis without cholecystectomy.  He presented after staff at nursing home noted fever, chills, altered mental status.  Evaluation in the emergency department included chest radiograph consistent with a right B6 airspace infiltrate.    1.  Sepsis ruled out  Initially concerns raised regarding sepsis given fever, tachypnea, and some decrease in mental state.  He had a mild elevation in lactate which however declined rapidly and likely is more on the basis of increased respiratory muscle effort.  By the morning following admission he is awake and alert and nearly at the mental state I have observed during his recovery phase during other admissions.  Although this is quite inconsistent with true sepsis which is essentially eliminated at this juncture.  2.  Aspiration pneumonitis  Some diffuse infiltrate but most prominent in right upper lobe consistent with the B6 infiltrate.  This would suggest an aspiration process.  Interestingly procalcitonin is not significantly elevated.  No leukocytosis.  Fever on presentation quite consistent with intense inflammation related to an aspiration event.  We will continue antibiotic treatment for the next 12- 18 hours however unless there is clear evidence of a persistent infectious process it may be most reasonable to discontinue antibiotics at that time.  3.  Encephalopathy  Background of significant dementia.  It did appear he had some degree of acute encephalopathy which however responded extremely rapidly.  Difficult to attribute this to infection itself and may be to volume shifts and fever.  Continue close  monitoring but unlikely that any further investigation would be warranted.  No focal findings at the present time.  4.  Coronary artery disease  No ischemia.  5.  Sick sinus syndrome  Has had permanent pacemaker placement on this basis.  Telemetry monitoring has shown adequate pacing and capture.  Will discontinue telemetry at this time.       DVT Prophylaxis: Enoxaparin subcutaneously code Status: DNR/DNI    Disposition: Expected discharge in 1-2 days depending on his course  Damir Isidrobben    Interval History   Awake and alert.  Pleasant and interactive.  Limited verbal output.  No dyspnea.  No cough.  No pain.    -Data reviewed today: I reviewed all new labs and imaging results over the last 24 hours. I personally reviewed chest radiograph findings as above      Peripheral IV 05/03/19 Right (Active)   Site Assessment WDL except;Bleeding 5/4/2019  8:00 AM   Line Status Infusing 5/4/2019  8:00 AM   Phlebitis Scale 0-->no symptoms 5/4/2019  8:00 AM   Infiltration Scale 1 5/4/2019  8:00 AM   Number of days: 1       Wound 03/25/13 (Active)   Number of days: 2231       Wound Tibial Abrasion(s);Shear injury Two large blisters to left shin with abrasions (Active)   Number of days:        Wound 07/16/16 Mid Scrotum Skin tear Pt scatched open area to scrotum (Active)   Number of days: 1022       Incision/Surgical Site 04/16/18 Left Chest (Active)   Number of days: 383     Line/device assessment(s) completed for medical necessity May 4    Physical Exam   Temp: 97  F (36.1  C) Temp src: Tympanic BP: 132/64   Heart Rate: 72 Resp: 18 SpO2: 94 % O2 Device: None (Room air)    Vitals:    05/04/19 0036   Weight: 68 kg (149 lb 14.6 oz)     Vital Signs with Ranges  Temp:  [97  F (36.1  C)-102.4  F (39.1  C)] 97  F (36.1  C)  Heart Rate:  [] 72  Resp:  [15-27] 18  BP: ()/(56-75) 132/64  SpO2:  [93 %-99 %] 94 %  I/O last 3 completed shifts:  In: 2319 [I.V.:2019; IV Piggyback:300]  Out: -     Awake, alert, distractible.   Minimal verbal response.  Following objects about him.  HEENT: Pupils equal, conjugate. No icterus or nystagmus. Oral mucosa moist. No facial asymmetry.   Neck: Supple, jugular veins not elevated. Trachea midline   Chest: No chest wall movement asymmetry. Aeration preserved to bases. Accessory muscles not in use. Expiratory time not increased. No tidal wheezes.  Coarse rhonchi especially to right mid lung zones. No discrete crackles.   Cardiac: PMI not displaced. S1, S2 unremarkable. No S3, S4. P2 not accentuated. No murmurs.   Abdomen: Soft. No palpation or percussion tenderness. No distention. Normoactive bowel sounds. Liver and spleen not increased in size. No bruits, masses, or pulsations.   Extremities: No lower extremity edema.  Warm distally.  No eccymoses, clubbing.   Neurologic: Mental state above. Motor 5/5 and bilaterally equal. Tone preserved. No fasiculations or tremors.     Medications       ampicillin-sulbactam (UNASYN) IV  3 g Intravenous Q6H     divalproex sodium delayed-release  250 mg Oral BID     enoxaparin  40 mg Subcutaneous Q24H     miconazole   Topical BID     omeprazole  20 mg Oral QAM AC     psyllium  1 packet Oral Daily           Data   Recent Labs   Lab 05/04/19  0512 05/03/19 2049   WBC 8.0 8.7   HGB 11.7* 14.1   MCV 84 86   * 189    142   POTASSIUM 3.6 4.1   CHLORIDE 111* 108   CO2 25 26   BUN 19 25   CR 0.58* 0.86   ANIONGAP 8 8   JOCELIN 8.0* 9.0   * 118*   ALBUMIN 2.4* 3.0*   PROTTOTAL 5.6* 6.9   BILITOTAL 0.4 0.4   ALKPHOS 94 125   ALT 10 15   AST 12 12   LIPASE  --  51*       Lactic Acid   Date Value Ref Range Status   05/04/2019 2.0 0.7 - 2.0 mmol/L Final   05/04/2019 2.4 (H) 0.7 - 2.0 mmol/L Final     Comment:     Significant value called to and read back by  TRINH REYNOSO AT 0048 ON 5/4/19 BY ALVIN.     05/03/2019 4.3 (HH) 0.7 - 2.0 mmol/L Final     Comment:     Critical Value called to and read back by  ARLENE KENDRICK 5/3/19 2105 BY [facility]         Recent Results (from  the past 24 hour(s))   XR Chest Port 1 View    Narrative    PROCEDURE:  XR CHEST PORT 1 VW    HISTORY: fever. .    COMPARISON:  4/20/2018    FINDINGS:    The cardiomediastinal contours are grossly unchanged.  Patchy airspace opacities appear worse when compared to priors. No  effusion or pneumothorax.      Impression    IMPRESSION:  Findings suspicious for recurrent pneumonia. Recommend  close follow-up.      JERRY PEREZ MD

## 2019-05-04 NOTE — ED NOTES
Lilliana from [facility] calls and update is given to her as far as admission and diagnosis.

## 2019-05-04 NOTE — ED NOTES
Arrived via [facility], from [facility], transferred to cot, call light reach.  Fever tonight.  Unable to access pain.

## 2019-05-04 NOTE — PROVIDER NOTIFICATION
DATE:  5/4/2019   TIME OF RECEIPT FROM LAB:  0048  LAB TEST:  lactic  LAB VALUE:  2.4  RESULTS GIVEN WITH READ-BACK TO (PROVIDER):  Shreyas Mccullough MD  TIME LAB VALUE REPORTED TO PROVIDER:   0053

## 2019-05-04 NOTE — ED NOTES
Bed: ED04  Expected date:   Expected time:   Means of arrival:   Comments:  Patient coming from [facility]

## 2019-05-04 NOTE — ED PROVIDER NOTES
History     Chief Complaint   Patient presents with     Fever     The history is provided by the EMS personnel, the nursing home and the spouse.   URI   Presenting symptoms: congestion, cough and fever    Severity:  Moderate  Onset quality:  Gradual  Progression:  Worsening  Chronicity:  Recurrent      Allergies:  Allergies   Allergen Reactions     Contrast Dye Unknown     IV dye, iodine containing contrast media     Fluvastatin Sodium Other (See Comments)     Lescol  Memory loss     Lovastatin Other (See Comments)     Memory loss     No Clinical Screening - See Comments      Iv dye       Problem List:    Patient Active Problem List    Diagnosis Date Noted     HCAP (healthcare-associated pneumonia) 04/18/2018     Priority: Medium     Aspiration pneumonia (H) 07/14/2016     Priority: Medium     GERD (gastroesophageal reflux disease) 04/01/2013     Priority: Medium     Fatigue 04/01/2013     Priority: Medium     Essential hypertension 04/01/2013     Priority: Medium     Sleep disorder 04/01/2013     Priority: Medium     CAD (coronary artery disease) 04/01/2013     Priority: Medium     Cardiac pacemaker, Medtronic, Dual chamber - DEPENDENT 08/14/2012     Priority: Medium     Sinoatrial node dysfunction (H) 04/23/2012     Priority: Medium     Hypercholesteremia 08/30/2011     Priority: Medium     Dementia with behavioral disturbance 01/01/2011     Priority: Medium     Osteoarthritis 01/01/2011     Priority: Medium     Problem list name updated by automated process. Provider to review       Bilateral inguinal hernia 11/06/2003     Priority: Medium     Problem list name updated by automated process. Provider to review          Past Medical History:    Past Medical History:   Diagnosis Date     CAD (coronary artery disease) 4/1/2013     Cardiac pacemaker, Medtronic, Dual chamber 8/14/2012     Cellulitis 3/25/13     Dementia 1/1/2011     Essential hypertension 4/1/2013     Fatigue 4/1/2013     GERD (gastroesophageal  reflux disease) 4/1/2013     Hypercholesteremia 8/30/2011     Inguinal hernia without mention of obstruction or gangrene, bilateral, (not specified as recurrent) 11/6/2003     Osteoarthrosis 1/1/2011     Senile dementia 4/1/2013     Sinoatrial node dysfunction (H) 4/23/2012     Sleep disorder 4/1/2013     Thigh pain 1/1/2011       Past Surgical History:    Past Surgical History:   Procedure Laterality Date     C TOTAL HIP ARTHROPLASTY  2007    right     CATARACT IOL, RT/LT  2008    bilateral     Colon resection      colon cancer     COLONOSCOPY  2003     pace maker  2005    complete heart block; DDDR     REPLACE PACEMAKER GENERATOR N/A 4/16/2018    Procedure: REPLACE PACEMAKER GENERATOR;  Pacemaker Generator Change;  Surgeon: Heron Baker MD;  Location: [facility] OR     STENT  1994    angioplasty     STENT, CORONARY, JANINA         Family History:    Family History   Problem Relation Age of Onset     Cancer - colorectal Father      Dementia Mother      Diabetes Maternal Aunt        Social History:  Marital Status:   [2]  Social History     Tobacco Use     Smoking status: Former Smoker     Types: Cigarettes     Tobacco comment: quit 30 years ago, 1965. no passive exposure   Substance Use Topics     Alcohol use: No     Drug use: Not on file        Medications:      divalproex sodium delayed-release (DEPAKOTE) 250 MG DR tablet   HYDROcodone-acetaminophen (NORCO) 5-325 MG tablet   omeprazole (PRILOSEC) 20 MG CR capsule   psyllium (METAMUCIL/KONSYL) Packet   CALMOSEPTINE 0.44-20.6 % OINT   loperamide (IMODIUM) 2 MG capsule   nystatin (MYCOSTATIN) 799711 UNIT/GM POWD   Rectal Barrier   SB IBUPROFEN 200 MG tablet         Review of Systems   Unable to perform ROS: Dementia   Constitutional: Positive for fever.   HENT: Positive for congestion.    Respiratory: Positive for cough.        Physical Exam   BP: 126/75  Heart Rate: 118  Temp: (!) 102.4  F (39.1  C)  Resp: 18  SpO2: 94 %      Physical Exam   Constitutional: He  appears well-developed and well-nourished. He appears lethargic.   HENT:   Head: Normocephalic and atraumatic.   Eyes: Pupils are equal, round, and reactive to light. Conjunctivae are normal.   Neck: Normal range of motion. Neck supple. No JVD present. No tracheal deviation present. No thyromegaly present.   Cardiovascular: Normal rate, regular rhythm, normal heart sounds and intact distal pulses. Exam reveals no gallop.   No murmur heard.  Pulmonary/Chest: Effort normal. No stridor. No respiratory distress. He has no wheezes. He has rales. He exhibits no tenderness.   Abdominal: Soft. Bowel sounds are normal. He exhibits no distension and no mass. There is no tenderness. There is no rebound and no guarding.   Musculoskeletal: Normal range of motion. He exhibits no edema or tenderness.   Lymphadenopathy:     He has no cervical adenopathy.   Neurological: He appears lethargic.   Skin: Skin is warm. No rash noted. No erythema. No pallor.   Psychiatric: His behavior is normal.   Nursing note and vitals reviewed.      ED Course        Procedures                   Results for orders placed or performed during the hospital encounter of 05/03/19 (from the past 24 hour(s))   CBC with platelets differential   Result Value Ref Range    WBC 8.7 4.0 - 11.0 10e9/L    RBC Count 5.02 4.4 - 5.9 10e12/L    Hemoglobin 14.1 13.3 - 17.7 g/dL    Hematocrit 43.0 40.0 - 53.0 %    MCV 86 78 - 100 fl    MCH 28.1 26.5 - 33.0 pg    MCHC 32.8 31.5 - 36.5 g/dL    RDW 15.5 (H) 10.0 - 15.0 %    Platelet Count 189 150 - 450 10e9/L    Diff Method Automated Method     % Neutrophils 78.9 %    % Lymphocytes 13.2 %    % Monocytes 6.6 %    % Eosinophils 0.7 %    % Basophils 0.3 %    % Immature Granulocytes 0.3 %    Nucleated RBCs 0 0 /100    Absolute Neutrophil 6.9 1.6 - 8.3 10e9/L    Absolute Lymphocytes 1.2 0.8 - 5.3 10e9/L    Absolute Monocytes 0.6 0.0 - 1.3 10e9/L    Absolute Eosinophils 0.1 0.0 - 0.7 10e9/L    Absolute Basophils 0.0 0.0 - 0.2  10e9/L    Abs Immature Granulocytes 0.0 0 - 0.4 10e9/L    Absolute Nucleated RBC 0.0    Comprehensive metabolic panel   Result Value Ref Range    Sodium 142 133 - 144 mmol/L    Potassium 4.1 3.4 - 5.3 mmol/L    Chloride 108 94 - 109 mmol/L    Carbon Dioxide 26 20 - 32 mmol/L    Anion Gap 8 3 - 14 mmol/L    Glucose 118 (H) 70 - 99 mg/dL    Urea Nitrogen 25 7 - 30 mg/dL    Creatinine 0.86 0.66 - 1.25 mg/dL    GFR Estimate 77 >60 mL/min/[1.73_m2]    GFR Estimate If Black 89 >60 mL/min/[1.73_m2]    Calcium 9.0 8.5 - 10.1 mg/dL    Bilirubin Total 0.4 0.2 - 1.3 mg/dL    Albumin 3.0 (L) 3.4 - 5.0 g/dL    Protein Total 6.9 6.8 - 8.8 g/dL    Alkaline Phosphatase 125 40 - 150 U/L    ALT 15 0 - 70 U/L    AST 12 0 - 45 U/L   Lipase   Result Value Ref Range    Lipase 51 (L) 73 - 393 U/L   CK total   Result Value Ref Range    CK Total 27 (L) 30 - 300 U/L   Lactic acid whole blood   Result Value Ref Range    Lactic Acid 4.3 (HH) 0.7 - 2.0 mmol/L   CRP inflammation   Result Value Ref Range    CRP Inflammation 13.7 (H) 0.0 - 8.0 mg/L   XR Chest Port 1 View    Narrative    PROCEDURE:  XR CHEST PORT 1 VW    HISTORY: fever. .    COMPARISON:  4/20/2018    FINDINGS:    The cardiomediastinal contours are grossly unchanged.  Patchy airspace opacities appear worse when compared to priors. No  effusion or pneumothorax.      Impression    IMPRESSION:  Findings suspicious for recurrent pneumonia. Recommend  close follow-up.      JERRY PEREZ MD       Medications   0.9% sodium chloride BOLUS (1,000 mLs Intravenous New Bag 5/3/19 7971)   cefTRIAXone in d5w (ROCEPHIN) intermittent infusion 1 g (1 g Intravenous New Bag 5/3/19 2201)   azithromycin (ZITHROMAX) 500 mg in sodium chloride 0.9 % 250 mL intermittent infusion (has no administration in time range)       Assessments & Plan (with Medical Decision Making)   Cough, URI symptoms  Became lethargic and febrile this evening  Labs elevated lactate 4  CXR: worsening of patchy  infiltrates  Sepsis workup done, IVF , ceftriaxone + zithro given    HCAp + AMS  Spoke to Dr Pinto, accepted for admission  I have reviewed the nursing notes.    I have reviewed the findings, diagnosis, plan and need for follow up with the patient.         Medication List      There are no discharge medications for this visit.         Final diagnoses:   HCAP (healthcare-associated pneumonia)       5/3/2019   [facility]     Jaspreet Reddy MD  05/03/19 1068

## 2019-05-04 NOTE — PROGRESS NOTES
Toi is currently sleeping. Spoke to Delia by phone. She requested a bed hold of [facility] and she would like him to return there at discharge via agency transportation.     We reviewed the Medicare IM letter. No questions.

## 2019-05-04 NOTE — H&P
[facility]    History and Physical  Hospitalist       Date of Admission:  5/3/2019    Assessment & Plan   Toi Cowart is a 88 year old male who presents with fever, chills, AMS, diagnosed with recurrent aspiration pneumonia and severe sepsis (Lactic 4.3). Will admit for treatment to medical floor.     Principal Problem:    Aspiration pneumonia (H)    Assessment: This is 3rd episode. H/o severe dementia, elderly, debilitated, alzeimer's dz - all risk factors. Good thing = he has no end organ dysfunction per SOFA, is normotensive, but lactate is 4.3 on admission, has dementia with worsening mental status (GCS 13).     Plan: Aspiration precautions   O2 to keep sats 92%   Swallow study in AM   Unasyn and azithromycin IV to cover strep, hemophillus, community acquired anaerobes.   Severe sepsis (H)    Assessment: present on admission and due to aspiration pneuonia    Plan: see pneumonia treatment.     Active Problems:    Cardiac pacemaker, Medtronic, Dual chamber - DEPENDENT    Assessment: present on admission.     Plan: will monitor on TELE      CAD (coronary artery disease)    Assessment: present on admission. No signs or sx of ongoing ischemia    Plan: No intervention required, only monitoring      Dementia with behavioral disturbance    Assessment: per history. No signs of agitation on admission.     Plan: continue home meds      Metabolic encephalopathy    Assessment: on top of dementia, due to sepsis    Plan: will treat primary condition, monitor    DVT Prophylaxis: Enoxaparin (Lovenox) SQ  Code Status: DNR / DNI    Disposition: Expected discharge in 3-4 days once stable.    Shreyas Mccullough    Primary Care Physician   RIDGE Bell    Chief Complaint     Reason for Admission: aspiration pneumonia, severe sepsis.     History is obtained from the electronic health record, emergency department physician and patient's spouse. Patient cannot provide history due to severe dementia.    History of Present  Illness   Toi Cowart is a 88 year old man from [place] and who carries PMH of dementia, CAD, sinus node dysfunction which required PPM implantation, also remote h/o of rectal cancer, severe cholecystitis (stil have GB), presents form NH due to Fever, chills, altered mental status. History of aspiration pneumonia in the past.     ED events; hemodynamically stable, febrile, weaker than usual.     ED labs: surprisingly unremarkable, except high lactate = 4.3, also elevated CRP    ED imaging: bibasilar infiltrates.     ED treatment: NS bolus, azactam, vancomycin    Past Medical History    I have reviewed this patient's medical history and updated it with pertinent information if needed.   Past Medical History:   Diagnosis Date     CAD (coronary artery disease) 4/1/2013    angioplasty, stent     Cardiac pacemaker, Medtronic, Dual chamber 8/14/2012     Cellulitis 3/25/13    Left anterior lower leg     Dementia 1/1/2011     Essential hypertension 4/1/2013     Fatigue 4/1/2013     GERD (gastroesophageal reflux disease) 4/1/2013     Hypercholesteremia 8/30/2011     Inguinal hernia without mention of obstruction or gangrene, bilateral, (not specified as recurrent) 11/6/2003     Osteoarthrosis 1/1/2011     Senile dementia 4/1/2013     Sinoatrial node dysfunction (H) 4/23/2012     Sleep disorder 4/1/2013     Thigh pain 1/1/2011       Past Surgical History   I have reviewed this patient's surgical history and updated it with pertinent information if needed.  Past Surgical History:   Procedure Laterality Date     C TOTAL HIP ARTHROPLASTY  2007    right     CATARACT IOL, RT/LT  2008    bilateral     Colon resection      colon cancer     COLONOSCOPY  2003     pace maker  2005    complete heart block; DDDR     REPLACE PACEMAKER GENERATOR N/A 4/16/2018    Procedure: REPLACE PACEMAKER GENERATOR;  Pacemaker Generator Change;  Surgeon: Heron Baker MD;  Location: [facility] OR     STENT  1994    angioplasty     STENT, CORONARY,  JANINA         Prior to Admission Medications   Prior to Admission Medications   Prescriptions Last Dose Informant Patient Reported? Taking?   CALMOSEPTINE 0.44-20.6 % OINT Unknown at Unknown time  No No   Sig: USE AS NEEDED.   HYDROcodone-acetaminophen (NORCO) 5-325 MG tablet 5/3/2019 at 1602  No Yes   Sig: TAKE 1 TABLET BY MOUTH TWICE DAILY AS NEEDED FOR SEVERE PAIN   Rectal Barrier Unknown at Unknown time  No No   Sig: Apply to affected areas as needed daily   SB IBUPROFEN 200 MG tablet Unknown at Unknown time  No No   Sig: TAKE 1 TABLET EVERY 4 HOURS AS NEEDED FOR PAIN OR FEVER   divalproex sodium delayed-release (DEPAKOTE) 250 MG DR tablet 5/3/2019 at 1602  No Yes   Sig: Take 1 tablet (250 mg) by mouth 2 times daily   loperamide (IMODIUM) 2 MG capsule Unknown at Unknown time  No No   Sig: TAKE 1 CAPSULE FOUR TIMES DAILY FOR DIARRHEA AS NEEDED   nystatin (MYCOSTATIN) 240054 UNIT/GM POWD Unknown at Unknown time  No No   Sig: Apply topically 2 times daily Apply to groin area twice daily.   omeprazole (PRILOSEC) 20 MG CR capsule 5/3/2019 at 1029  No Yes   Sig: TAKE 1 CAPSULE DAILY ON EMPTY STOMACH.   psyllium (METAMUCIL/KONSYL) Packet 5/3/2019 at 1602  Yes Yes   Sig: Take 1 packet by mouth daily      Facility-Administered Medications: None     Allergies   Allergies   Allergen Reactions     Contrast Dye Unknown     IV dye, iodine containing contrast media     Fluvastatin Sodium Other (See Comments)     Lescol  Memory loss     Lovastatin Other (See Comments)     Memory loss     No Clinical Screening - See Comments      Iv dye       Social History   I have reviewed this patient's social history and updated it with pertinent information if needed. Toi Cowart  reports that he has quit smoking. His smoking use included cigarettes. He does not have any smokeless tobacco history on file. He reports that he does not drink alcohol.    Family History   I have reviewed this patient's family history and updated it with  pertinent information if needed.   Family History   Problem Relation Age of Onset     Cancer - colorectal Father      Dementia Mother      Diabetes Maternal Aunt        Review of Systems   Patient is non-verbal and cannot provide ROS    Physical Exam   Temp: 98.3  F (36.8  C) Temp src: Tympanic BP: 132/64   Heart Rate: 97 Resp: 20 SpO2: 99 % O2 Device: None (Room air)    Vital Signs with Ranges  Temp:  [98.3  F (36.8  C)-102.4  F (39.1  C)] 98.3  F (36.8  C)  Heart Rate:  [] 97  Resp:  [15-27] 20  BP: ()/(56-75) 132/64  SpO2:  [93 %-99 %] 99 %  0 lbs 0 oz    Physical exam:   GENERAL: Patient is awake, alert, oriented x 0,  and in no distress, but anxious.   HEENT: NC/AT. MM very dry. OP clear.  Conjunctivae not pale, EOMI,  fundoscopic exam reveals clear optic nerve disc margins. Tympanic membranes intact.   CHEST: symmetrical and not tender on palpation. Both sides moves symmetrically with inspiration.   CARDIAC: PMI not displaced, S1,S2 present, no S3. No murmurs/gallops or rubs. No pulsatile masses. Peripheral pulses easily palpable. No bruits in the neck. No peripheral edema.    PULMONARY: not labored breathing, no accessory muscle use. Bibasilar crackles.   GI: abdomen not distended and not tender, bowel sounds present. No hepatosplenomegaly or pulsatile masses.   : CVA not tender, bladder not palpable.  MS: Major joints without effusion, full range of motion, no synovitis, no sarcopenia  NEUROLOGICAL: limited due to non-cooperation, but non-focal.   PSYCHIATRIC: awake, confused, not agitated, tearful. NON-VERBAL.  INTEGUMENT: intact, no rash, no ulcerations.  LYMPHATIC/HEMATOLOGIC: no LAD neck, axilla, and groin. No petechiae, no ecchymoses.       Goals of care were discussed with the patient and family which included but not limited to anticipated treatment course during the current hospitalization, recovery from current event, discharge planning and transitions of care responsibilities and  expected outcomes. Code status was addressed on admission as well. End of life care discussion not done. Wife confirmed DNR/DNI code status.     Data   Data reviewed today:  I personally reviewed the EKG tracing showing paced rhythm and the chest x-ray image(s) showing bibasilar infiltrates. .  Recent Labs   Lab 05/03/19  2049   WBC 8.7   HGB 14.1   MCV 86         POTASSIUM 4.1   CHLORIDE 108   CO2 26   BUN 25   CR 0.86   ANIONGAP 8   JOCELIN 9.0   *   ALBUMIN 3.0*   PROTTOTAL 6.9   BILITOTAL 0.4   ALKPHOS 125   ALT 15   AST 12   LIPASE 51*       Recent Results (from the past 24 hour(s))   XR Chest Port 1 View    Narrative    PROCEDURE:  XR CHEST PORT 1 VW    HISTORY: fever. .    COMPARISON:  4/20/2018    FINDINGS:    The cardiomediastinal contours are grossly unchanged.  Patchy airspace opacities appear worse when compared to priors. No  effusion or pneumothorax.      Impression    IMPRESSION:  Findings suspicious for recurrent pneumonia. Recommend  close follow-up.      JERRY PEREZ MD

## 2019-05-04 NOTE — PLAN OF CARE
Pt alert, mostly nonverbal but does speak rarely. Speech is clear and logical, otherwise shakes head yes or no. Temp highest 99.4. RR 18-20, sating greater than 92% on room air. Lung sounds are diminished with some crackles. HR 70-90s and regular. Neuros intact. Pt NPO throughout night pending swallow eval. He appears to be constantly chewing on something, but nothing found with oral cares. Skin has some scratches otherwise intact. Pt repositioned Q2H, incontinent of urine. IV Unasyn, IV Doxycycline and IVF initiated. Alarms on, call light within reach and pt calls appropriately.     Face to face report given with opportunity to observe patient.    Report given to RONDA Bedolla   5/4/2019  7:49 AM

## 2019-05-04 NOTE — PLAN OF CARE
[facility] Inpatient Admission Note:    Patient admitted to 3104/3104-1 at approximately 0015 via cart accompanied by other:Er staff from emergency room . Report received from Lien in SBAR format at 0000 via telephone. Patient transferred to bed via slide board.. Patient is alert and oriented X JEANETH, denies pain; rates at 0 on 0-10 scale.  Patient oriented to room, unit, hourly rounding, and plan of care. Explained admission packet and patient handbook with patient bill of rights brochure. Will continue to monitor and document as needed.     Inpatient Nursing criteria listed below was met:    Health care directives status obtained and documented: Yes    Care Everywhere authorization obtained No    MRSA swab completed for patient 65 years and older: Yes    Patient identifies a surrogate decision maker: Yes If yes, who:wife Delia Contact Information:[phone]    Core Measure diagnosis present:No.      If initial lactic acid >2.0, repeat lactic acid drawn within one hour of arrival to unit: Yes. If no, state reason: drawn    Vaccination assessment and education completed: Yes   Vaccinations received prior to admission: Pneumovax yes  Influenza(seasonal)  YES   Vaccination(s) ordered: received prior    Clergy visit ordered if patient requests: N/A    Skin issues/needs documented: N/A    Isolation Patient: no Education given, correct sign in place and documentation row added to PCS:  NA    Fall Prevention Yes: Care plan updated, education given and documented, sticker and magnet in place: Yes    Care Plan initiated: Yes    Education Documented (including assessment): Yes    Patient has discharge needs : No If yes, please explain:NA

## 2019-05-04 NOTE — ED NOTES
DATE:  5/3/2019   TIME OF RECEIPT FROM LAB:  2043  LAB TEST:  lactate  LAB VALUE:  5.2  RESULTS GIVEN WITH READ-BACK TO (PROVIDER):  Jaspreet Reddy MD  TIME LAB VALUE REPORTED TO PROVIDER:   2034

## 2019-05-04 NOTE — ED NOTES
DATE:  5/3/2019   TIME OF RECEIPT FROM LAB:  2105  LAB TEST:  Lactic Acid  LAB VALUE:  4.3  RESULTS GIVEN WITH READ-BACK TO (PROVIDER):  Jaspreet Reddy MD  TIME LAB VALUE REPORTED TO PROVIDER:   2105

## 2019-05-05 NOTE — PLAN OF CARE
Alert.  Slow to respond.  Apprehensive with cares.  Transfers with assist of 2.  Up to chair for meal.  Very vocal at mealtime of likes and dislikes.  Prefers thickened apple juice.  Spit out HS med this evening.  Lungs clear.  No cough noted.  Afebrile.  Remains on room air this shift.  No shortness of breath or labored breathing.  IV remains to lock.  Does take in thickened liquids when offered.  IV intact, unchanged blood under tegaderm dressing.    Face to face report given with opportunity to observe patient.  Report given to RONDA Curran.    Yusra Geronimo  5/4/2019, 11:17 PM

## 2019-05-05 NOTE — DISCHARGE SUMMARY
[facility]    Discharge Summary  Hospitalist    Date of Admission:  5/3/2019  Date of Discharge:  5/5/2019  Discharging Provider: Damir Tsang  Date of Service (when I saw the patient): 05/05/19    Discharge Diagnoses   Aspiration pneumonitis, likely noninfectious  Sepsis ruled out  Transient acute metabolic encephalopathy  Established significant dementia  Established coronary artery disease  Established sick sinus syndrome with permanent pacemaker placement    History of Present Illness   Toi Cowart is a 88 year old  man who was admitted on 5/3/2019.  He is a resident of [facility] and carries a history of dementia, CAD, sinus node dysfunction with PPM implantation, as well as a remote history of rectal cancer, cholecystitis without cholecystectomy.  He presented after staff at nursing home noted fever, chills, altered mental status.  Evaluation in the emergency department included chest radiograph consistent with a right B6 airspace infiltrate.    Hospital Course   Toi Cowart was admitted on 5/3/2019.  The following problems were addressed during his hospitalization:    1.  Sepsis ruled out  Initially concerns raised regarding sepsis given fever, tachypnea, and some decrease in mental state.  He had a mild elevation in lactate which however declined rapidly and likely is more on the basis of increased respiratory muscle effort.  By the morning following admission he Was awake and alert and nearly at the mental state I have observed during his recovery phase during other admissions.   2.  Aspiration pneumonitis  Some diffuse infiltrate but most prominent in right upper lobe consistent with the B6 infiltrate.  This would suggest an aspiration process.  Interestingly procalcitonin is not significantly elevated.  No leukocytosis.  Fever on presentation quite consistent with intense inflammation related to an aspiration event.    Continued intravenous antibiotic treatment for the first  "several days of hospitalization with a transition to oral Augmentin to complete a 5-day course.  3.    Acute metabolic encephalopathy  Acute encephalopathy has resolved.  He has a background of significant dementia.    Some degree of acute encephalopathy which however responded extremely rapidly.  Difficult to attribute this to infection itself and may be to volume shifts and fever.  No focal findings at the present time.  4.  Coronary artery disease  No ischemia.  5.  Sick sinus syndrome  Has had permanent pacemaker placement on this basis.  Telemetry monitoring has shown adequate pacing and capture.  Will discontinue telemetry at this time.      Damir Tsang    Pending Results   These results will be followed up by Dr. Bell  Unresulted Labs Ordered in the Past 30 Days of this Admission     Date and Time Order Name Status Description    5/3/2019 2034 Blood culture Preliminary     5/3/2019 2034 Blood culture Preliminary           Code Status   DNR / DNI       Primary Care Physician   RIDGE Bell    Vital signs:  Temp: 98.9  F (37.2  C) Temp src: Tympanic BP: 169/79   Heart Rate: 68 Resp: 24 SpO2: 98 % O2 Device: None (Room air)   Height: 165.1 cm (5' 5\") Weight: 69.1 kg (152 lb 5.4 oz)  Estimated body mass index is 25.35 kg/m  as calculated from the following:    Height as of this encounter: 1.651 m (5' 5\").    Weight as of this encounter: 69.1 kg (152 lb 5.4 oz).    Awake, alert, distractible.  Minimal verbal response.  Following objects about him.  HEENT: Pupils equal, conjugate. No icterus or nystagmus. Oral mucosa moist. No facial asymmetry.   Neck: Supple, jugular veins not elevated. Trachea midline   Chest: No chest wall movement asymmetry. Aeration preserved to bases. Accessory muscles not in use. Expiratory time not increased. No tidal wheezes.  Coarse rhonchi especially to right mid lung zones. No discrete crackles.   Cardiac: PMI not displaced. S1, S2 unremarkable. No S3, S4. P2 not accentuated. " No murmurs.   Abdomen: Soft. No palpation or percussion tenderness. No distention. Normoactive bowel sounds. Liver and spleen not increased in size. No bruits, masses, or pulsations.   Extremities: No lower extremity edema.  Warm distally.  No eccymoses, clubbing.   Neurologic: Mental state above. Motor 5/5 and bilaterally equal. Tone preserved. No fasiculations or tremors.         Discharge Disposition   Discharged to nursing home  Condition at discharge: Good    Consultations This Hospital Stay   SPEECH LANGUAGE PATH ADULT IP CONSULT  SOCIAL WORK IP CONSULT  PHYSICAL THERAPY ADULT IP CONSULT  OCCUPATIONAL THERAPY ADULT IP CONSULT  SPEECH LANGUAGE PATH ADULT IP CONSULT    Time Spent on this Encounter   I, Damir Tsang, personally saw the patient today and spent greater than 30 minutes discharging this patient.    Discharge Orders      General info for SNF    Length of Stay Estimate: Long Term Care  Condition at Discharge: Stable  Level of care:skilled   Rehabilitation Potential: Fair  Admission H&P remains valid and up-to-date: Yes  Recent Chemotherapy: N/A  Use Nursing Home Standing Orders: Yes     Mantoux instructions    Give two-step Mantoux (PPD) Per Facility Policy Yes     Reason for your hospital stay    Toi Cowart is a 88 year old man who was admitted on 5/3/2019.  He is a resident of [facility] and carries a history of dementia, CAD, sinus node dysfunction with PPM implantation, as well as a remote history of rectal cancer, cholecystitis without cholecystectomy.  He presented after staff at nursing home noted fever, chills, altered mental status.  Evaluation in the emergency department included chest radiograph consistent with a right B6 airspace infiltrate suggestive of an aspiration event.  Mental state improved rapidly to his baseline level of dementia.  No significant respiratory difficulties and likely this is a noninfectious aspiration event; will continue a short course of antibiotics  to ensure there is no underlying infectious process.  Swallowing function improved through his hospital course.     Follow Up and recommended labs and tests    Follow up with Nursing home physician.     Activity - Up with nursing assistance     Activity - Up with assistive device     DNR/DNI     Speech Language Path Adult Consult    Evaluate and treat as clinically indicated.    Reason:  Dysphagia; aspiration event     Fall precautions     Advance Diet as Tolerated    Follow this diet upon discharge: Orders Placed This Encounter      Advance Diet as Tolerated: Fully Advanced to diet(s) per Provider order; Dysphagia Diet Level 2: Mechan Altered; Nectar Thickened Liquids (pre-thickened or use instant food thickener); Low Saturated Fat Low Cholesterol Diet  Further adjustment in consistency per speech pathology     Discharge Medications   Current Discharge Medication List      START taking these medications    Details   albuterol (PROVENTIL) (2.5 MG/3ML) 0.083% neb solution Take 1 vial (2.5 mg) by nebulization every 4 hours as needed for wheezing    Associated Diagnoses: Aspiration pneumonia of right upper lobe, unspecified aspiration pneumonia type (H)      amoxicillin-clavulanate (AUGMENTIN) 875-125 MG tablet Take 1 tablet by mouth every 12 hours    Associated Diagnoses: Aspiration pneumonia of right upper lobe, unspecified aspiration pneumonia type (H)         CONTINUE these medications which have NOT CHANGED    Details   divalproex sodium delayed-release (DEPAKOTE) 250 MG DR tablet Take 1 tablet (250 mg) by mouth 2 times daily  Qty: 60 tablet, Refills: 1    Associated Diagnoses: Dementia due to medical condition without behavioral disturbance      HYDROcodone-acetaminophen (NORCO) 5-325 MG tablet TAKE 1 TABLET BY MOUTH TWICE DAILY AS NEEDED FOR SEVERE PAIN  Qty: 56 tablet, Refills: 0    Associated Diagnoses: Pain      omeprazole (PRILOSEC) 20 MG CR capsule TAKE 1 CAPSULE DAILY ON EMPTY STOMACH.  Qty: 28 capsule,  Refills: 11    Associated Diagnoses: Gastroesophageal reflux disease without esophagitis      psyllium (METAMUCIL/KONSYL) Packet Take 1 packet by mouth daily      CALMOSEPTINE 0.44-20.6 % OINT USE AS NEEDED.  Qty: 113 g, Refills: 5    Associated Diagnoses: Skin irritation      loperamide (IMODIUM) 2 MG capsule TAKE 1 CAPSULE FOUR TIMES DAILY FOR DIARRHEA AS NEEDED  Qty: 120 capsule, Refills: 0    Associated Diagnoses: Diarrhea      nystatin (MYCOSTATIN) 254686 UNIT/GM POWD Apply topically 2 times daily Apply to groin area twice daily.  Qty: 60 g, Refills: 0    Associated Diagnoses: Yeast infection of the skin      Rectal Barrier Apply to affected areas as needed daily  Qty: 90 g, Refills: 0    Associated Diagnoses: Generalized muscle weakness           Allergies   Allergies   Allergen Reactions     Contrast Dye Unknown     IV dye, iodine containing contrast media     Fluvastatin Sodium Other (See Comments)     Lescol  Memory loss     Lovastatin Other (See Comments)     Memory loss     No Clinical Screening - See Comments      Iv dye     Data   Most Recent 3 CBC's:  Recent Labs   Lab Test 05/05/19  0820 05/04/19  0512 05/03/19 2049   WBC 6.1 8.0 8.7   HGB 13.0* 11.7* 14.1   MCV 85 84 86   * 149* 189      Most Recent 3 BMP's:  Recent Labs   Lab Test 05/05/19  0820 05/04/19  0512 05/03/19 2049    144 142   POTASSIUM 3.7 3.6 4.1   CHLORIDE 107 111* 108   CO2 28 25 26   BUN 10 19 25   CR 0.58* 0.58* 0.86   ANIONGAP 6 8 8   JOCELIN 8.6 8.0* 9.0   GLC 81 104* 118*     Most Recent 2 LFT's:  Recent Labs   Lab Test 05/04/19  0512 05/03/19 2049   AST 12 12   ALT 10 15   ALKPHOS 94 125   BILITOTAL 0.4 0.4     Most Recent INR's and Anticoagulation Dosing History:  Anticoagulation Dose History     Recent Dosing and Labs Latest Ref Rng & Units 10/30/2015 7/13/2016    INR 0.80 - 1.20 1.28(H) 1.20        Most Recent 3 Troponin's:  Recent Labs   Lab Test 07/14/16  0452 07/13/16  2240   TROPI 0.039 0.037     Most Recent  Cholesterol Panel:  Recent Labs   Lab Test 05/16/14  0909   CHOL 149   LDL 77   HDL 34*   TRIG 189*     Most Recent 6 Bacteria Isolates From Any Culture (See EPIC Reports for Culture Details):  Recent Labs   Lab Test 05/04/19  0245 05/03/19 2059 05/03/19  2047 04/18/18  1937 04/18/18  1722 04/18/18  1710   CULT No MRSA isolated No growth after 2 days No growth after 2 days No MRSA isolated No growth after 6 days No growth after 6 days     Most Recent TSH, T4 and A1c Labs:No lab results found.  Results for orders placed or performed during the hospital encounter of 05/03/19   XR Chest Port 1 View    Narrative    PROCEDURE:  XR CHEST PORT 1 VW    HISTORY: fever. .    COMPARISON:  4/20/2018    FINDINGS:    The cardiomediastinal contours are grossly unchanged.  Patchy airspace opacities appear worse when compared to priors. No  effusion or pneumothorax.      Impression    IMPRESSION:  Findings suspicious for recurrent pneumonia. Recommend  close follow-up.      JERRY PEREZ MD

## 2019-05-05 NOTE — PLAN OF CARE
Patient discharged at 1:35 PM via wheel chair accompanied by other:[employer] transport tech and staff. Prescriptions sent to patients preferred pharmacy. All belongings sent with patient.     Discharge instructions reviewed with Yovana Cm. Listed belongings gathered and returned to patient. Yes, no belongings present    Patient discharged to [facility].   Report called to Nursing Home:  [facility]     Core Measures and Vaccines  Core Measures applicable during stay: No. If yes, state diagnosis: n/a  Pneumonia and Influenza given prior to discharge, if indicated: N/A    Surgical Patient   Surgical Procedures during stay: none  Did patient receive discharge instruction on wound care and recognition of infection symptoms? N/A    MISC  Follow up appointment made:  No, pt to follow up w/SNF physician  Home and hospital aquired medications returned to patient: N/A  Patient reports pain was well managed at discharge: Yes

## 2019-05-05 NOTE — PLAN OF CARE
VSS, denies pain, flacc scores 0. LS CTA w/dim bases. More interactive and responding more appropriately today than yesterday. BS normoactive. Incontinent of urine. Turning independently in bed, up in chair for meals, taking in adequate PO diet. Ambulates assist x2 w/GB & FWW. IV removed per protocol.

## 2019-05-05 NOTE — PLAN OF CARE
Pt alert. He denies pain. New IV placed this shift. Temp 99.2. HR 70s. /64. RR 18 and sating 93% on room air. Lung sounds are diminished with crackles in the bases. Incontinent of urine. Repositioned Q2H throughout night. IV ABX continued. Alarms on and call light within reach.     Face to face report given with opportunity to observe patient.    Report given to RONDA Bedolla   5/5/2019  7:23 AM

## 2019-05-05 NOTE — PROGRESS NOTES
Name: Toi Cowart    MRN#: [id number]    Reason for Hospitalization: HCAP (healthcare-associated pneumonia) [J18.9]    TAWNYA: average    Discharge Date: 5/5/2019    Medicare IM letter reviewed with Delia yesterday    Patient / Family response to discharge plan: updated Delia by phone, she understands and agrees with plan to return to [facility]    Follow-Up Appt:   Future Appointments   Date Time Provider Department Center   5/7/2019 12:00 AM [facility]       Other Providers (Care Coordinator, County Services, PCA services etc): Yes: [facility] updated by phone and by fax    Discharge Disposition: [facility] long term care facility via [employer]      Deysi Nevarez RN

## 2019-05-22 NOTE — TELEPHONE ENCOUNTER
omeprazole (PRILOSEC) 20 MG CR capsule   Last Written Prescription Date:  6/20/18  Last Fill Quantity: 28,   # refills: 11  Last Office Visit: 5/1/19  Future Office visit:

## 2019-05-22 NOTE — TELEPHONE ENCOUNTER
norco       Last Written Prescription Date:  4/26/19  Last Fill Quantity: 56,   # refills: 0  Last Office Visit: 5/1/19  Future Office visit:

## 2019-07-03 NOTE — PROGRESS NOTES
Visit Date:   2019      SUBJECTIVE:  The patient, who has a remote history of cholangitis, now has become more weak and more jaundiced.  Conference with family and they want the patient to be comfort cares, no further labs.      OBJECTIVE:   VITAL SIGNS:  Respirations 18, O2 sat 95.  He is afebrile, pulse 78, blood pressure 128/78.  Weight 143.     GENERAL:  He is jaundiced.   CARDIAC:  Regular.   LUNGS:  Clear.   ABDOMEN:  No peritoneal signs.     NEUROLOGIC:  He lies there and looks, but does not verbally express any thoughts.      ASSESSMENT AND PLAN:  End-stage dementia, is DNR, now with jaundice, suspect reoccurrence of his cholangitis.  The family does not want any lab work or aggressive intervention, just wants to keep him comfortable.  Looking at changing him to comfort cares and increase the Norco to every 4 hours p.r.n. pain.  Note, his other history is of coronary artery disease, hypertension, sick sinus syndrome and pacemaker placement.         RIDGE QUINN MD             D: 2019   T: 2019   MT: EVELYN      Name:     ESA LEON   MRN:      [id number]        Account:      [id number]   :      1930           Visit Date:   2019      Document: [id number]       cc: [facility]

## 2019-07-08 ENCOUNTER — TELEPHONE (OUTPATIENT)
Dept: FAMILY MEDICINE | Facility: OTHER | Age: 84
End: 2019-07-08

## 2019-07-08 NOTE — TELEPHONE ENCOUNTER
Received voicemail from Jeremiah at [facility] pharmacy regarding Morphine injections that this patient was supposedly prescribed by Dr. Herzog on 7/5/19 (16 injections) and 6 more injections prescribed by Dr. Pitts on 7/7/19. I do not see any documentation regarding these, Jeremiah states a B vs D determination needs to be made through patient's insurance at [phone]. Patient ID is [id number]. I do not see any morphine prescribed and no notes regarding this. Please advise how to proceed with this as I cannot attempt a PA without a medication order or any notes regarding need.

## 2020-02-06 NOTE — TELEPHONE ENCOUNTER
Patient is at [facility] in [place].  A message was left with them to call back.    Wife was called, she believes 4/16/18 will work for the generator change.      Shima Cota LPN  4/12/2018  11:51 AM      They will arrange transportation,  PAC nurse will call both [facility] and wife to notify of time and instructions.    Shima Cota LPN  4/12/2018  12:04 PM     06-Feb-2020 00:00

## 2022-04-20 NOTE — PROGRESS NOTES
Okay to provide a note.  Please see last message regarding this.  He needs to follow-up with me if he has no improvement on oral antibiotic.   Visit Date:   2019      SUBJECTIVE:  Staff reports no issues other than he has not used his albuterol nebs and would like to have that discontinued.      PHYSICAL EXAMINATION:   VITAL SIGNS:  On exam, respirations 18, sat 94.  He is afebrile, pulse 77, blood pressure 138/68.  Last weight 143.   GENERAL:  He is Lying in the bed, breathing comfortably, in no distress.   LUNGS:  Clear.   CARDIAC:  Regular.   ABDOMEN:  Soft.      ASSESSMENT:  End-stage dementia, is a DNR status.  He has a history of coronary artery disease, hypertension, sick sinus syndrome, status post pacemaker placement and a remote history of cholangitis.      PLAN:  Chart and meds reviewed.  We will discontinue the albuterol nebs.         RIDGE QUINN MD             D: 2019   T: 2019   MT: IAN      Name:     ESA LEON   MRN:      [id number]        Account:      [id number]   :      1930           Visit Date:   2019      Document: [id number]       cc: [facility]

## 2022-10-21 NOTE — PHARMACY
Please rotate Motrin and Tylenol as we discussed   Increase fluids   Follow up with PCP in 10 days to recheck the ear, sooner if continued fever [facility]    Pharmacy      Antimicrobial Stewardship Note     Current antimicrobial therapy:  Anti-infectives (From now, onward)    Start     Dose/Rate Route Frequency Ordered Stop    05/04/19 0015  ampicillin-sulbactam (UNASYN) 3 g in sodium chloride 0.9 % 100 mL intermittent infusion      3 g  over 1 Hours Intravenous EVERY 6 HOURS 05/04/19 0015          Indication: aspiration pneumonia    Days of Therapy: 1     Pertinent labs:  Creatinine   Creatinine   Date Value Ref Range Status   05/04/2019 0.58 (L) 0.66 - 1.25 mg/dL Final   05/03/2019 0.86 0.66 - 1.25 mg/dL Final   04/20/2018 0.85 0.66 - 1.25 mg/dL Final     WBC   WBC   Date Value Ref Range Status   05/04/2019 8.0 4.0 - 11.0 10e9/L Final   05/03/2019 8.7 4.0 - 11.0 10e9/L Final   04/20/2018 4.7 4.0 - 11.0 10e9/L Final     Procalcitonin   Procalcitonin   Date Value Ref Range Status   05/04/2019 0.06 ng/ml Final     Comment:     0.05-0.24 ng/ml Low risk of systemic bacterial infection. Local bacterial   infection possible.  Recommendation: Assess other clinical features of   infection. Discourage antibiotics unless strong clinical suspicion for serious   infection.     04/19/2018 1.42 ng/ml Final     Comment:     0.50-1.99 ng/ml  Moderate risk of systemic infection.  Recommendation:   Recommend antibiotics. Evaluate culture results and clinical features to   target antibacterial therapy. Obtain blood cultures and other relevant   cultures if not done.  If empiric antibiotics were started, recheck PCT in: 2 days to guide   antibiotic de-escalation.  Discontinue or de-escalate antibiotics when PCT   concentration is <80% of peak or abs PCT <0.5. If empiric antibiotics were NOT   started, recheck PCT in 6-24 hours to re-evaluate need for antibiotics.     04/18/2018 0.62 ng/ml Final     Comment:     0.50-1.99 ng/ml  Moderate risk of systemic infection.  Recommendation:   Recommend antibiotics. Evaluate culture results and clinical features to   target  antibacterial therapy. Obtain blood cultures and other relevant   cultures if not done.  If empiric antibiotics were started, recheck PCT in: 2 days to guide   antibiotic de-escalation.  Discontinue or de-escalate antibiotics when PCT   concentration is <80% of peak or abs PCT <0.5. If empiric antibiotics were NOT   started, recheck PCT in 6-24 hours to re-evaluate need for antibiotics.       CRP   CRP Inflammation   Date Value Ref Range Status   05/03/2019 13.7 (H) 0.0 - 8.0 mg/L Final   04/18/2018 19.7 (H) 0.0 - 8.0 mg/L Final       Culture Results:   7-Day Micro Results     Procedure Component Value Units Date/Time   Active MRSA Surveillance Culture [[id number]] Collected: 05/04/19 0245   Order Status: Completed Lab Status: Preliminary result Updated: 05/04/19 0616   Specimen: Nares from Nose     Specimen Description Nares    Culture Micro Culture in progress   Sputum Culture Aerobic Bacterial    Order Status: No result Lab Status: No result    Specimen: Sputum    Gram stain    Order Status: No result Lab Status: No result    Specimen: Sputum    Influenza A and B and RSV PCR [[id number]] Collected: 05/03/19 2155   Order Status: Completed Lab Status: Final result Updated: 05/03/19 2233   Specimen: Nasopharyngeal from Nasal Swab     Specimen Description Nasopharyngeal    Influenza A PCR Negative    Comment: Flu A target RNA not detected, presumed negative for Influenza A or the viral   load is below the limit of detection.        Influenza B PCR Negative    Comment: Flu B target RNA not detected, presumed negative for Influenza B or the viral   load is below the limit of detection.        Resp Syncytial Virus Negative    Comment: RSV target RNA not detected, presumed negative for Respiratory Syncitial Virus    or the viral load is below the limit of detection.   FDA approved assay performed using [employer] GeneXpert(R) real-time PCR.       Blood culture [[id number]] Collected: 05/03/19 2059   Order Status: Completed Lab Status:  Preliminary result Updated: 05/04/19 0954   Specimen: Blood     Specimen Description Blood    Culture Micro No growth after 12 hours   Blood culture [[id number]] Collected: 05/03/19 2047   Order Status: Completed Lab Status: Preliminary result Updated: 05/04/19 0954   Specimen: Blood     Specimen Description Blood    Culture Micro No growth after 13 hours   Urine Culture Aerobic Bacterial    Order Status: Canceled Lab Status: No result    Specimen: Urine      Recommendations/Interventions:  1. No recommendations at this time    Suki Gamboa, [facility]  May 4, 2019

## (undated) DEVICE — SOL WATER 1500ML

## (undated) DEVICE — GLOVE BIOGEL INDICATOR 7.5 LF 41675

## (undated) DEVICE — SU MONOCRYL 4-0 PS-2 27" UND Y426H

## (undated) DEVICE — KIT WRENCH 5873W

## (undated) DEVICE — PREP CHLORAPREP 26ML TINTED GREEN 260825

## (undated) DEVICE — GLOVE PROTEXIS POWDER FREE SMT 7.5  2D72PT75X

## (undated) DEVICE — Device

## (undated) DEVICE — SUTURE 3-0 VICRYL TIES J910T

## (undated) DEVICE — DRAPE IOBAN INCISE 23X17" 6650EZ

## (undated) DEVICE — SU VICRYL 3-0 SH 27" UND J416H

## (undated) DEVICE — PACK MAJOR LAPAROTOMY LF SBA15MLFCA

## (undated) DEVICE — DRSG MEDIPORE 3 1/2X4" 3566

## (undated) DEVICE — DRAPE C-ARM PACK 9"

## (undated) RX ORDER — FENTANYL CITRATE 50 UG/ML
INJECTION, SOLUTION INTRAMUSCULAR; INTRAVENOUS
Status: DISPENSED
Start: 2018-04-16

## (undated) RX ORDER — ONDANSETRON 2 MG/ML
INJECTION INTRAMUSCULAR; INTRAVENOUS
Status: DISPENSED
Start: 2018-04-16

## (undated) RX ORDER — CEFAZOLIN SODIUM 2 G/100ML
INJECTION, SOLUTION INTRAVENOUS
Status: DISPENSED
Start: 2018-04-16

## (undated) RX ORDER — PHENYLEPHRINE HCL IN 0.9% NACL 1 MG/10 ML
SYRINGE (ML) INTRAVENOUS
Status: DISPENSED
Start: 2018-04-16

## (undated) RX ORDER — LIDOCAINE HYDROCHLORIDE 20 MG/ML
INJECTION, SOLUTION EPIDURAL; INFILTRATION; INTRACAUDAL; PERINEURAL
Status: DISPENSED
Start: 2018-04-16

## (undated) RX ORDER — PROPOFOL 10 MG/ML
INJECTION, EMULSION INTRAVENOUS
Status: DISPENSED
Start: 2018-04-16

## (undated) RX ORDER — BUPIVACAINE HYDROCHLORIDE AND EPINEPHRINE 5; 5 MG/ML; UG/ML
INJECTION, SOLUTION EPIDURAL; INTRACAUDAL; PERINEURAL
Status: DISPENSED
Start: 2018-04-16